# Patient Record
Sex: FEMALE | Race: WHITE | Employment: OTHER | URBAN - METROPOLITAN AREA
[De-identification: names, ages, dates, MRNs, and addresses within clinical notes are randomized per-mention and may not be internally consistent; named-entity substitution may affect disease eponyms.]

---

## 2022-04-08 ENCOUNTER — OFFICE VISIT (OUTPATIENT)
Dept: CARDIOTHORACIC SURGERY | Age: 72
End: 2022-04-08
Payer: COMMERCIAL

## 2022-04-08 VITALS — HEIGHT: 62 IN | OXYGEN SATURATION: 95 % | BODY MASS INDEX: 29.81 KG/M2 | WEIGHT: 162 LBS | HEART RATE: 81 BPM

## 2022-04-08 DIAGNOSIS — R91.8 LUNG MASS: Primary | ICD-10-CM

## 2022-04-08 PROCEDURE — 99204 OFFICE O/P NEW MOD 45 MIN: CPT | Performed by: THORACIC SURGERY (CARDIOTHORACIC VASCULAR SURGERY)

## 2022-04-08 ASSESSMENT — ENCOUNTER SYMPTOMS
NAUSEA: 0
SORE THROAT: 0
SHORTNESS OF BREATH: 0
WHEEZING: 0
DIARRHEA: 0
CHEST TIGHTNESS: 0
ABDOMINAL PAIN: 0
VOICE CHANGE: 0
ABDOMINAL DISTENTION: 0
COUGH: 0
STRIDOR: 0
CHOKING: 0
TROUBLE SWALLOWING: 0
VOMITING: 0

## 2022-04-08 NOTE — PROGRESS NOTES
Subjective:      Patient ID: Mark Mccabe is a 71 y.o. female who presents today for:  Chief Complaint   Patient presents with    Pulmonary Nodule       HPI  Patient is found of a small nodule in the periphery of her right upper lobe. Per the patient this was on a CAT scan from 2 years ago at another institution but it increased in size. PET scan was performed showing some mild hyperactivity. Patient was referred for surgical evaluation. Patient is in her usual state of health. She denies any cough hemoptysis or unexpected weight loss. She is a lifelong non-smoker and can easily climb 2 flights of stairs. The patient has lost family members to lung cancer and is extremely anxious over this nodule and strongly desires to have it removed for definitive diagnosis.     Past Medical History:   Diagnosis Date    Arthritis     Diabetes mellitus ([address]     GERD (gastroesophageal reflux disease)     Hypertension     Overactive bladder     Urinary urgency       Past Surgical History:   Procedure Laterality Date     SECTION      X1    CYST REMOVAL      head    DILATION AND CURETTAGE      X2    EYE SURGERY       Social History     Socioeconomic History    Marital status: Not on file     Spouse name: Not on file    Number of children: Not on file    Years of education: Not on file    Highest education level: Not on file   Occupational History    Not on file   Tobacco Use    Smoking status: Never Smoker    Smokeless tobacco: Never Used   Substance and Sexual Activity    Alcohol use: No    Drug use: No    Sexual activity: Never   Other Topics Concern    Not on file   Social History Narrative    Not on file     Social Determinants of Health     Financial Resource Strain:     Difficulty of Paying Living Expenses: Not on file   Food Insecurity:     Worried About Running Out of Food in the Last Year: Not on file    Terra of Food in the Last Year: Not on file   Transportation Needs:     Lack of Transportation (Medical): Not on file    Lack of Transportation (Non-Medical): Not on file   Physical Activity:     Days of Exercise per Week: Not on file    Minutes of Exercise per Session: Not on file   Stress:     Feeling of Stress : Not on file   Social Connections:     Frequency of Communication with Friends and Family: Not on file    Frequency of Social Gatherings with Friends and Family: Not on file    Attends Advent Services: Not on file    Active Member of Clubs or Organizations: Not on file    Attends Club or Organization Meetings: Not on file    Marital Status: Not on file   Intimate Partner Violence:     Fear of Current or Ex-Partner: Not on file    Emotionally Abused: Not on file    Physically Abused: Not on file    Sexually Abused: Not on file   Housing Stability:     Unable to Pay for Housing in the Last Year: Not on file    Number of [place] in the Last Year: Not on file    Unstable Housing in the Last Year: Not on file     Family History   Problem Relation Age of Onset    Diabetes Maternal Grandfather     Cancer Father     Cancer Mother      Allergies   Allergen Reactions    Accupril [Quinapril Hcl]     Sulfa Antibiotics     Vibramycin [Doxycycline Calcium]     Amoxicillin Rash    Codeine Nausea And Vomiting    Erythromycin Rash    Volmax [Albuterol] Anxiety     Current Outpatient Medications on File Prior to Visit   Medication Sig Dispense Refill    terconazole (TERAZOL 3) 0.8 % vaginal cream Place vaginally nightly for 3 days 1 Tube 3    magnesium (MAGNESIUM-OXIDE) 250 MG TABS tablet Take 250 mg by mouth daily      atorvastatin (LIPITOR) 20 MG tablet Take 20 mg by mouth daily      omeprazole (PRILOSEC) 20 MG capsule Take 20 mg by mouth daily.  potassium chloride (MICRO-K) 10 MEQ CR capsule Take 10 mEq by mouth 2 times daily.  oxybutynin (DITROPAN-XL) 10 MG CR tablet Take 10 mg by mouth daily.       doxazosin (CARDURA) 2 MG tablet Take 2 mg by mouth nightly.  triamterene-hydrochlorothiazide (MAXZIDE-25) 37.5-25 MG per tablet Take 1 tablet by mouth daily.  metFORMIN (GLUCOPHAGE) 500 MG tablet Take 1,000 mg by mouth 2 times daily (with meals)       glipiZIDE (GLUCOTROL) 5 MG tablet Take 5 mg by mouth 2 times daily (before meals).  aspirin 81 MG tablet Take 81 mg by mouth daily.  calcium carbonate 600 MG TABS tablet Take 1 tablet by mouth daily.  Cholecalciferol (VITAMIN D) 5000 UNITS CAPS capsule Take 5,000 Units by mouth daily. No current facility-administered medications on file prior to visit. Review of Systems   Constitutional: Negative for activity change, appetite change, chills, diaphoresis, fatigue, fever and unexpected weight change. HENT: Negative for sore throat, trouble swallowing and voice change. Eyes: Negative for visual disturbance. Respiratory: Negative for cough, choking, chest tightness, shortness of breath, wheezing and stridor. Cardiovascular: Negative for chest pain, palpitations and leg swelling. Gastrointestinal: Negative for abdominal distention, abdominal pain, diarrhea, nausea and vomiting. Genitourinary: Negative for difficulty urinating. Musculoskeletal: Negative for gait problem and joint swelling. Skin: Negative for rash and wound. Neurological: Negative for dizziness, seizures and light-headedness. Hematological: Negative for adenopathy. Does not bruise/bleed easily. Psychiatric/Behavioral: Negative for behavioral problems and confusion. Objective:   Pulse 81   Ht 5' 2\" (1.575 m)   Wt 162 lb (73.5 kg)   SpO2 95%   BMI 29.63 kg/m²     Physical Exam  Constitutional:       General: She is not in acute distress. Appearance: She is not ill-appearing, toxic-appearing or diaphoretic. HENT:      Head: Normocephalic and atraumatic. Nose: Nose normal.   Eyes:      Extraocular Movements: Extraocular movements intact.       Conjunctiva/sclera: Conjunctivae normal.      Pupils: Pupils are equal, round, and reactive to light. Neck:      Vascular: No carotid bruit. Cardiovascular:      Rate and Rhythm: Normal rate and regular rhythm. Heart sounds: Normal heart sounds. No murmur heard. No gallop. Pulmonary:      Effort: Pulmonary effort is normal. No respiratory distress. Breath sounds: Normal breath sounds. No wheezing or rales. Abdominal:      General: Abdomen is flat. Bowel sounds are normal.      Palpations: Abdomen is soft. There is no mass. Tenderness: There is no abdominal tenderness. Musculoskeletal:         General: No swelling. Cervical back: Neck supple. Right lower leg: No edema. Left lower leg: No edema. Lymphadenopathy:      Cervical: No cervical adenopathy. Skin:     General: Skin is warm and dry. Neurological:      General: No focal deficit present. Mental Status: She is alert and oriented to person, place, and time. Psychiatric:         Mood and Affect: Mood normal.         Behavior: Behavior normal.         Thought Content: Thought content normal.         Judgment: Judgment normal.               Radiographs and Laboratory Studies:     Diagnostic Imaging Studies:    I personally viewed her CAT scan as well as her PET scan. There is a small nodule in the periphery of the right upper lobe with some mild activity. I do not appreciate any other significant intrathoracic pathology. Pulmonary function test numbers are excellent        Assessment/Plan     Suspicious right upper lobe nodule in a patient was extremely anxious about the possibility of lung cancer. I did go over options and the patient strongly desires resection. We discussed a right VATS upper lobe wedge possible lobectomy. The patient understands the risk benefits potential outcomes and alternative therapies and agrees to proceed. Surgical date is April 28.     The patient was counseled at length about the risks of sarwat Covid-19 during their perioperative period and any recovery window from their procedure. The patient was made aware that sarwat Covid-19  may worsen their prognosis for recovering from their procedure  and lend to a higher morbidity and/or mortality risk. All material risks, benefits, and reasonable alternatives including postponing the procedure were discussed. The patient does wish to proceed with the procedure at this time. Diagnosis Orders   1. Lung mass       No orders of the defined types were placed in this encounter. No orders of the defined types were placed in this encounter. No follow-ups on file.       Adithya Abbott MD

## 2022-04-26 ENCOUNTER — HOSPITAL ENCOUNTER (OUTPATIENT)
Dept: PREADMISSION TESTING | Age: 72
Discharge: HOME | DRG: 164 | End: 2022-04-30
Payer: COMMERCIAL

## 2022-04-26 VITALS
DIASTOLIC BLOOD PRESSURE: 72 MMHG | OXYGEN SATURATION: 98 % | HEART RATE: 87 BPM | SYSTOLIC BLOOD PRESSURE: 136 MMHG | RESPIRATION RATE: 16 BRPM | BODY MASS INDEX: 29.37 KG/M2 | HEIGHT: 62 IN | WEIGHT: 159.6 LBS | TEMPERATURE: 96.9 F

## 2022-04-26 LAB
ANION GAP SERPL CALCULATED.3IONS-SCNC: 16 MEQ/L (ref 9–15)
BUN BLDV-MCNC: 24 MG/DL (ref 8–23)
CALCIUM SERPL-MCNC: 9.9 MG/DL (ref 8.5–9.9)
CHLORIDE BLD-SCNC: 104 MEQ/L (ref 95–107)
CO2: 19 MEQ/L (ref 20–31)
CREAT SERPL-MCNC: 1.11 MG/DL (ref 0.5–0.9)
GFR AFRICAN AMERICAN: 58.6
GFR NON-AFRICAN AMERICAN: 48.4
GLUCOSE BLD-MCNC: 103 MG/DL (ref 70–99)
HCT VFR BLD CALC: 39.1 % (ref 37–47)
HEMOGLOBIN: 13 G/DL (ref 12–16)
MCH RBC QN AUTO: 31.9 PG (ref 27–31.3)
MCHC RBC AUTO-ENTMCNC: 33.1 % (ref 33–37)
MCV RBC AUTO: 96.4 FL (ref 82–100)
PDW BLD-RTO: 12.9 % (ref 11.5–14.5)
PLATELET # BLD: 279 K/UL (ref 130–400)
POTASSIUM SERPL-SCNC: 4.1 MEQ/L (ref 3.4–4.9)
RBC # BLD: 4.06 M/UL (ref 4.2–5.4)
SODIUM BLD-SCNC: 139 MEQ/L (ref 135–144)
WBC # BLD: 6.6 K/UL (ref 4.8–10.8)

## 2022-04-26 PROCEDURE — 86900 BLOOD TYPING SEROLOGIC ABO: CPT

## 2022-04-26 PROCEDURE — 86850 RBC ANTIBODY SCREEN: CPT

## 2022-04-26 PROCEDURE — 85027 COMPLETE CBC AUTOMATED: CPT

## 2022-04-26 PROCEDURE — 80048 BASIC METABOLIC PNL TOTAL CA: CPT

## 2022-04-26 PROCEDURE — 86901 BLOOD TYPING SEROLOGIC RH(D): CPT

## 2022-04-26 RX ORDER — LANOLIN ALCOHOL/MO/W.PET/CERES
1000 CREAM (GRAM) TOPICAL DAILY
COMMUNITY

## 2022-04-26 RX ORDER — BUPROPION HYDROCHLORIDE 150 MG/1
150 TABLET, EXTENDED RELEASE ORAL 2 TIMES DAILY
COMMUNITY

## 2022-04-26 RX ORDER — SPIRONOLACTONE 25 MG/1
25 TABLET ORAL DAILY
COMMUNITY

## 2022-04-26 RX ORDER — CHLORTHALIDONE 25 MG/1
25 TABLET ORAL DAILY
COMMUNITY

## 2022-04-26 RX ORDER — ESCITALOPRAM OXALATE 5 MG/1
5 TABLET ORAL DAILY
COMMUNITY

## 2022-04-26 RX ORDER — EMPAGLIFLOZIN 25 MG/1
25 TABLET, FILM COATED ORAL DAILY
COMMUNITY

## 2022-04-26 RX ORDER — EXENATIDE 2 MG/.85ML
INJECTION, SUSPENSION, EXTENDED RELEASE SUBCUTANEOUS
COMMUNITY

## 2022-04-27 ENCOUNTER — ANESTHESIA EVENT (OUTPATIENT)
Dept: OPERATING ROOM | Age: 72
DRG: 164 | End: 2022-04-27
Payer: COMMERCIAL

## 2022-04-27 LAB
ABO/RH: NORMAL
ANTIBODY SCREEN: NORMAL

## 2022-04-27 RX ORDER — LIDOCAINE HYDROCHLORIDE 10 MG/ML
1 INJECTION, SOLUTION EPIDURAL; INFILTRATION; INTRACAUDAL; PERINEURAL
Status: CANCELLED | OUTPATIENT
Start: 2022-04-28 | End: 2022-04-28

## 2022-04-27 RX ORDER — SODIUM CHLORIDE, SODIUM LACTATE, POTASSIUM CHLORIDE, CALCIUM CHLORIDE 600; 310; 30; 20 MG/100ML; MG/100ML; MG/100ML; MG/100ML
INJECTION, SOLUTION INTRAVENOUS CONTINUOUS
Status: CANCELLED | OUTPATIENT
Start: 2022-04-28

## 2022-04-27 RX ORDER — SODIUM CHLORIDE 0.9 % (FLUSH) 0.9 %
5-40 SYRINGE (ML) INJECTION EVERY 12 HOURS SCHEDULED
Status: CANCELLED | OUTPATIENT
Start: 2022-04-28

## 2022-04-27 RX ORDER — SODIUM CHLORIDE 0.9 % (FLUSH) 0.9 %
5-40 SYRINGE (ML) INJECTION PRN
Status: CANCELLED | OUTPATIENT
Start: 2022-04-28

## 2022-04-27 RX ORDER — SODIUM CHLORIDE 9 MG/ML
INJECTION, SOLUTION INTRAVENOUS PRN
Status: CANCELLED | OUTPATIENT
Start: 2022-04-28

## 2022-04-28 ENCOUNTER — HOSPITAL ENCOUNTER (INPATIENT)
Age: 72
LOS: 3 days | Discharge: HOME | DRG: 164 | End: 2022-05-01
Attending: THORACIC SURGERY (CARDIOTHORACIC VASCULAR SURGERY) | Admitting: THORACIC SURGERY (CARDIOTHORACIC VASCULAR SURGERY)
Payer: COMMERCIAL

## 2022-04-28 ENCOUNTER — APPOINTMENT (OUTPATIENT)
Dept: GENERAL RADIOLOGY | Age: 72
DRG: 164 | End: 2022-04-28
Attending: THORACIC SURGERY (CARDIOTHORACIC VASCULAR SURGERY)
Payer: COMMERCIAL

## 2022-04-28 ENCOUNTER — ANESTHESIA (OUTPATIENT)
Dept: OPERATING ROOM | Age: 72
DRG: 164 | End: 2022-04-28
Payer: COMMERCIAL

## 2022-04-28 VITALS — TEMPERATURE: 96.1 F | OXYGEN SATURATION: 97 %

## 2022-04-28 DIAGNOSIS — Z90.2 STATUS POST LOBECTOMY OF LUNG: Primary | ICD-10-CM

## 2022-04-28 LAB
CHP ED QC CHECK: NORMAL
GLUCOSE BLD-MCNC: 121 MG/DL
GLUCOSE BLD-MCNC: 121 MG/DL (ref 70–99)
GLUCOSE BLD-MCNC: 167 MG/DL (ref 70–99)
GLUCOSE BLD-MCNC: 195 MG/DL (ref 70–99)
PERFORMED ON: ABNORMAL

## 2022-04-28 PROCEDURE — 2580000003 HC RX 258: Performed by: NURSE PRACTITIONER

## 2022-04-28 PROCEDURE — 2720000010 HC SURG SUPPLY STERILE: Performed by: THORACIC SURGERY (CARDIOTHORACIC VASCULAR SURGERY)

## 2022-04-28 PROCEDURE — 6360000002 HC RX W HCPCS: Performed by: THORACIC SURGERY (CARDIOTHORACIC VASCULAR SURGERY)

## 2022-04-28 PROCEDURE — 1210000000 HC MED SURG R&B

## 2022-04-28 PROCEDURE — 3E0T3BZ INTRODUCTION OF ANESTHETIC AGENT INTO PERIPHERAL NERVES AND PLEXI, PERCUTANEOUS APPROACH: ICD-10-PCS | Performed by: THORACIC SURGERY (CARDIOTHORACIC VASCULAR SURGERY)

## 2022-04-28 PROCEDURE — 2580000003 HC RX 258: Performed by: NURSE ANESTHETIST, CERTIFIED REGISTERED

## 2022-04-28 PROCEDURE — 3700000000 HC ANESTHESIA ATTENDED CARE: Performed by: THORACIC SURGERY (CARDIOTHORACIC VASCULAR SURGERY)

## 2022-04-28 PROCEDURE — 71045 X-RAY EXAM CHEST 1 VIEW: CPT

## 2022-04-28 PROCEDURE — 6360000002 HC RX W HCPCS: Performed by: NURSE ANESTHETIST, CERTIFIED REGISTERED

## 2022-04-28 PROCEDURE — 2709999900 HC NON-CHARGEABLE SUPPLY: Performed by: THORACIC SURGERY (CARDIOTHORACIC VASCULAR SURGERY)

## 2022-04-28 PROCEDURE — 2060000000 HC ICU INTERMEDIATE R&B

## 2022-04-28 PROCEDURE — 88309 TISSUE EXAM BY PATHOLOGIST: CPT

## 2022-04-28 PROCEDURE — 88341 IMHCHEM/IMCYTCHM EA ADD ANTB: CPT

## 2022-04-28 PROCEDURE — 3600000003 HC SURGERY LEVEL 3 BASE: Performed by: THORACIC SURGERY (CARDIOTHORACIC VASCULAR SURGERY)

## 2022-04-28 PROCEDURE — 0BTC4ZZ RESECTION OF RIGHT UPPER LUNG LOBE, PERCUTANEOUS ENDOSCOPIC APPROACH: ICD-10-PCS | Performed by: THORACIC SURGERY (CARDIOTHORACIC VASCULAR SURGERY)

## 2022-04-28 PROCEDURE — 6370000000 HC RX 637 (ALT 250 FOR IP): Performed by: THORACIC SURGERY (CARDIOTHORACIC VASCULAR SURGERY)

## 2022-04-28 PROCEDURE — 3600000013 HC SURGERY LEVEL 3 ADDTL 15MIN: Performed by: THORACIC SURGERY (CARDIOTHORACIC VASCULAR SURGERY)

## 2022-04-28 PROCEDURE — 88307 TISSUE EXAM BY PATHOLOGIST: CPT

## 2022-04-28 PROCEDURE — 7100000001 HC PACU RECOVERY - ADDTL 15 MIN: Performed by: THORACIC SURGERY (CARDIOTHORACIC VASCULAR SURGERY)

## 2022-04-28 PROCEDURE — 6360000002 HC RX W HCPCS: Performed by: ANESTHESIOLOGY

## 2022-04-28 PROCEDURE — 88331 PATH CONSLTJ SURG 1 BLK 1SPC: CPT

## 2022-04-28 PROCEDURE — 7100000000 HC PACU RECOVERY - FIRST 15 MIN: Performed by: THORACIC SURGERY (CARDIOTHORACIC VASCULAR SURGERY)

## 2022-04-28 PROCEDURE — 32674 THORACOSCOPY LYMPH NODE EXC: CPT | Performed by: THORACIC SURGERY (CARDIOTHORACIC VASCULAR SURGERY)

## 2022-04-28 PROCEDURE — 2500000003 HC RX 250 WO HCPCS: Performed by: NURSE ANESTHETIST, CERTIFIED REGISTERED

## 2022-04-28 PROCEDURE — 88342 IMHCHEM/IMCYTCHM 1ST ANTB: CPT

## 2022-04-28 PROCEDURE — 76942 ECHO GUIDE FOR BIOPSY: CPT | Performed by: ANESTHESIOLOGY

## 2022-04-28 PROCEDURE — 94761 N-INVAS EAR/PLS OXIMETRY MLT: CPT

## 2022-04-28 PROCEDURE — 3700000001 HC ADD 15 MINUTES (ANESTHESIA): Performed by: THORACIC SURGERY (CARDIOTHORACIC VASCULAR SURGERY)

## 2022-04-28 PROCEDURE — 88305 TISSUE EXAM BY PATHOLOGIST: CPT

## 2022-04-28 PROCEDURE — 2580000003 HC RX 258: Performed by: THORACIC SURGERY (CARDIOTHORACIC VASCULAR SURGERY)

## 2022-04-28 PROCEDURE — C1729 CATH, DRAINAGE: HCPCS | Performed by: THORACIC SURGERY (CARDIOTHORACIC VASCULAR SURGERY)

## 2022-04-28 PROCEDURE — 32663 THORACOSCOPY W/LOBECTOMY: CPT | Performed by: THORACIC SURGERY (CARDIOTHORACIC VASCULAR SURGERY)

## 2022-04-28 PROCEDURE — 07B74ZZ EXCISION OF THORAX LYMPHATIC, PERCUTANEOUS ENDOSCOPIC APPROACH: ICD-10-PCS | Performed by: THORACIC SURGERY (CARDIOTHORACIC VASCULAR SURGERY)

## 2022-04-28 PROCEDURE — 32668 THORACOSCOPY W/W RESECT DIAG: CPT | Performed by: THORACIC SURGERY (CARDIOTHORACIC VASCULAR SURGERY)

## 2022-04-28 RX ORDER — ONDANSETRON 4 MG/1
4 TABLET, ORALLY DISINTEGRATING ORAL EVERY 8 HOURS PRN
Status: DISCONTINUED | OUTPATIENT
Start: 2022-04-28 | End: 2022-05-01 | Stop reason: HOSPADM

## 2022-04-28 RX ORDER — SODIUM CHLORIDE, SODIUM LACTATE, POTASSIUM CHLORIDE, CALCIUM CHLORIDE 600; 310; 30; 20 MG/100ML; MG/100ML; MG/100ML; MG/100ML
INJECTION, SOLUTION INTRAVENOUS CONTINUOUS
Status: DISCONTINUED | OUTPATIENT
Start: 2022-04-28 | End: 2022-04-29

## 2022-04-28 RX ORDER — ONDANSETRON 2 MG/ML
INJECTION INTRAMUSCULAR; INTRAVENOUS PRN
Status: DISCONTINUED | OUTPATIENT
Start: 2022-04-28 | End: 2022-04-28 | Stop reason: SDUPTHER

## 2022-04-28 RX ORDER — PROPOFOL 10 MG/ML
INJECTION, EMULSION INTRAVENOUS PRN
Status: DISCONTINUED | OUTPATIENT
Start: 2022-04-28 | End: 2022-04-28 | Stop reason: SDUPTHER

## 2022-04-28 RX ORDER — SODIUM CHLORIDE 0.9 % (FLUSH) 0.9 %
5-40 SYRINGE (ML) INJECTION EVERY 12 HOURS SCHEDULED
Status: DISCONTINUED | OUTPATIENT
Start: 2022-04-28 | End: 2022-05-01 | Stop reason: HOSPADM

## 2022-04-28 RX ORDER — ONDANSETRON 2 MG/ML
4 INJECTION INTRAMUSCULAR; INTRAVENOUS
Status: DISCONTINUED | OUTPATIENT
Start: 2022-04-28 | End: 2022-04-28 | Stop reason: HOSPADM

## 2022-04-28 RX ORDER — FAMOTIDINE 20 MG/1
20 TABLET, FILM COATED ORAL 2 TIMES DAILY
Status: DISCONTINUED | OUTPATIENT
Start: 2022-04-28 | End: 2022-04-28

## 2022-04-28 RX ORDER — FAMOTIDINE 20 MG/1
20 TABLET, FILM COATED ORAL DAILY
Status: DISCONTINUED | OUTPATIENT
Start: 2022-04-28 | End: 2022-05-01 | Stop reason: HOSPADM

## 2022-04-28 RX ORDER — DOXAZOSIN MESYLATE 4 MG/1
2 TABLET ORAL NIGHTLY
Status: DISCONTINUED | OUTPATIENT
Start: 2022-04-28 | End: 2022-05-01 | Stop reason: HOSPADM

## 2022-04-28 RX ORDER — LANOLIN ALCOHOL/MO/W.PET/CERES
400 CREAM (GRAM) TOPICAL DAILY
Status: DISCONTINUED | OUTPATIENT
Start: 2022-04-29 | End: 2022-05-01 | Stop reason: HOSPADM

## 2022-04-28 RX ORDER — CHLORTHALIDONE 25 MG/1
25 TABLET ORAL DAILY
Status: DISCONTINUED | OUTPATIENT
Start: 2022-04-29 | End: 2022-05-01 | Stop reason: HOSPADM

## 2022-04-28 RX ORDER — METOCLOPRAMIDE HYDROCHLORIDE 5 MG/ML
10 INJECTION INTRAMUSCULAR; INTRAVENOUS
Status: DISCONTINUED | OUTPATIENT
Start: 2022-04-28 | End: 2022-04-28 | Stop reason: HOSPADM

## 2022-04-28 RX ORDER — LIDOCAINE HYDROCHLORIDE 10 MG/ML
1 INJECTION, SOLUTION EPIDURAL; INFILTRATION; INTRACAUDAL; PERINEURAL
Status: DISCONTINUED | OUTPATIENT
Start: 2022-04-28 | End: 2022-04-28 | Stop reason: HOSPADM

## 2022-04-28 RX ORDER — SODIUM CHLORIDE 9 MG/ML
INJECTION, SOLUTION INTRAVENOUS
Status: DISPENSED
Start: 2022-04-28 | End: 2022-04-28

## 2022-04-28 RX ORDER — ROCURONIUM BROMIDE 10 MG/ML
INJECTION, SOLUTION INTRAVENOUS PRN
Status: DISCONTINUED | OUTPATIENT
Start: 2022-04-28 | End: 2022-04-28 | Stop reason: SDUPTHER

## 2022-04-28 RX ORDER — FENTANYL CITRATE 50 UG/ML
INJECTION, SOLUTION INTRAMUSCULAR; INTRAVENOUS PRN
Status: DISCONTINUED | OUTPATIENT
Start: 2022-04-28 | End: 2022-04-28 | Stop reason: SDUPTHER

## 2022-04-28 RX ORDER — ONDANSETRON 2 MG/ML
4 INJECTION INTRAMUSCULAR; INTRAVENOUS EVERY 6 HOURS PRN
Status: DISCONTINUED | OUTPATIENT
Start: 2022-04-28 | End: 2022-05-01 | Stop reason: HOSPADM

## 2022-04-28 RX ORDER — SODIUM CHLORIDE 0.9 % (FLUSH) 0.9 %
5-40 SYRINGE (ML) INJECTION PRN
Status: DISCONTINUED | OUTPATIENT
Start: 2022-04-28 | End: 2022-04-28 | Stop reason: HOSPADM

## 2022-04-28 RX ORDER — SPIRONOLACTONE 25 MG/1
25 TABLET ORAL DAILY
Status: DISCONTINUED | OUTPATIENT
Start: 2022-04-29 | End: 2022-05-01 | Stop reason: HOSPADM

## 2022-04-28 RX ORDER — ESCITALOPRAM OXALATE 10 MG/1
5 TABLET ORAL DAILY
Status: DISCONTINUED | OUTPATIENT
Start: 2022-04-28 | End: 2022-05-01 | Stop reason: HOSPADM

## 2022-04-28 RX ORDER — SODIUM CHLORIDE 9 MG/ML
INJECTION, SOLUTION INTRAVENOUS PRN
Status: DISCONTINUED | OUTPATIENT
Start: 2022-04-28 | End: 2022-04-28 | Stop reason: HOSPADM

## 2022-04-28 RX ORDER — KETOROLAC TROMETHAMINE 15 MG/ML
15 INJECTION, SOLUTION INTRAMUSCULAR; INTRAVENOUS EVERY 6 HOURS PRN
Status: DISCONTINUED | OUTPATIENT
Start: 2022-04-28 | End: 2022-05-01 | Stop reason: HOSPADM

## 2022-04-28 RX ORDER — ATORVASTATIN CALCIUM 20 MG/1
20 TABLET, FILM COATED ORAL NIGHTLY
Status: DISCONTINUED | OUTPATIENT
Start: 2022-04-28 | End: 2022-05-01 | Stop reason: HOSPADM

## 2022-04-28 RX ORDER — MAGNESIUM SULFATE 1 G/100ML
1000 INJECTION INTRAVENOUS PRN
Status: DISCONTINUED | OUTPATIENT
Start: 2022-04-28 | End: 2022-05-01 | Stop reason: HOSPADM

## 2022-04-28 RX ORDER — PHENOL 1.4 %
1 AEROSOL, SPRAY (ML) MUCOUS MEMBRANE DAILY
Status: DISCONTINUED | OUTPATIENT
Start: 2022-04-28 | End: 2022-04-28

## 2022-04-28 RX ORDER — BUPROPION HYDROCHLORIDE 150 MG/1
150 TABLET, EXTENDED RELEASE ORAL 2 TIMES DAILY
Status: DISCONTINUED | OUTPATIENT
Start: 2022-04-28 | End: 2022-05-01 | Stop reason: HOSPADM

## 2022-04-28 RX ORDER — ENOXAPARIN SODIUM 100 MG/ML
40 INJECTION SUBCUTANEOUS DAILY
Status: DISCONTINUED | OUTPATIENT
Start: 2022-04-28 | End: 2022-05-01 | Stop reason: HOSPADM

## 2022-04-28 RX ORDER — SODIUM CHLORIDE 0.9 % (FLUSH) 0.9 %
5-40 SYRINGE (ML) INJECTION EVERY 12 HOURS SCHEDULED
Status: DISCONTINUED | OUTPATIENT
Start: 2022-04-28 | End: 2022-04-28 | Stop reason: HOSPADM

## 2022-04-28 RX ORDER — SODIUM CHLORIDE, SODIUM LACTATE, POTASSIUM CHLORIDE, CALCIUM CHLORIDE 600; 310; 30; 20 MG/100ML; MG/100ML; MG/100ML; MG/100ML
INJECTION, SOLUTION INTRAVENOUS CONTINUOUS
Status: DISCONTINUED | OUTPATIENT
Start: 2022-04-28 | End: 2022-04-28 | Stop reason: HOSPADM

## 2022-04-28 RX ORDER — SODIUM CHLORIDE, SODIUM LACTATE, POTASSIUM CHLORIDE, CALCIUM CHLORIDE 600; 310; 30; 20 MG/100ML; MG/100ML; MG/100ML; MG/100ML
INJECTION, SOLUTION INTRAVENOUS CONTINUOUS PRN
Status: DISCONTINUED | OUTPATIENT
Start: 2022-04-28 | End: 2022-04-28 | Stop reason: SDUPTHER

## 2022-04-28 RX ORDER — ROPIVACAINE HYDROCHLORIDE 5 MG/ML
INJECTION, SOLUTION EPIDURAL; INFILTRATION; PERINEURAL PRN
Status: DISCONTINUED | OUTPATIENT
Start: 2022-04-28 | End: 2022-04-28 | Stop reason: SDUPTHER

## 2022-04-28 RX ORDER — SODIUM CHLORIDE 9 MG/ML
INJECTION, SOLUTION INTRAVENOUS CONTINUOUS
Status: DISCONTINUED | OUTPATIENT
Start: 2022-04-28 | End: 2022-04-29

## 2022-04-28 RX ORDER — SODIUM CHLORIDE 9 MG/ML
INJECTION, SOLUTION INTRAVENOUS PRN
Status: DISCONTINUED | OUTPATIENT
Start: 2022-04-28 | End: 2022-05-01 | Stop reason: HOSPADM

## 2022-04-28 RX ORDER — DIPHENHYDRAMINE HYDROCHLORIDE 50 MG/ML
12.5 INJECTION INTRAMUSCULAR; INTRAVENOUS
Status: DISCONTINUED | OUTPATIENT
Start: 2022-04-28 | End: 2022-04-28 | Stop reason: HOSPADM

## 2022-04-28 RX ORDER — NICOTINE POLACRILEX 4 MG
15 LOZENGE BUCCAL PRN
Status: DISCONTINUED | OUTPATIENT
Start: 2022-04-28 | End: 2022-05-01

## 2022-04-28 RX ORDER — MEPERIDINE HYDROCHLORIDE 25 MG/ML
12.5 INJECTION INTRAMUSCULAR; INTRAVENOUS; SUBCUTANEOUS
Status: DISCONTINUED | OUTPATIENT
Start: 2022-04-28 | End: 2022-04-28 | Stop reason: HOSPADM

## 2022-04-28 RX ORDER — MIDAZOLAM HYDROCHLORIDE 1 MG/ML
INJECTION INTRAMUSCULAR; INTRAVENOUS PRN
Status: DISCONTINUED | OUTPATIENT
Start: 2022-04-28 | End: 2022-04-28 | Stop reason: SDUPTHER

## 2022-04-28 RX ORDER — DEXTROSE MONOHYDRATE 25 G/50ML
12.5 INJECTION, SOLUTION INTRAVENOUS PRN
Status: DISCONTINUED | OUTPATIENT
Start: 2022-04-28 | End: 2022-05-01

## 2022-04-28 RX ORDER — NALOXONE HYDROCHLORIDE 0.4 MG/ML
INJECTION, SOLUTION INTRAMUSCULAR; INTRAVENOUS; SUBCUTANEOUS PRN
Status: DISCONTINUED | OUTPATIENT
Start: 2022-04-28 | End: 2022-05-01 | Stop reason: HOSPADM

## 2022-04-28 RX ORDER — DEXTROSE MONOHYDRATE 50 MG/ML
100 INJECTION, SOLUTION INTRAVENOUS PRN
Status: DISCONTINUED | OUTPATIENT
Start: 2022-04-28 | End: 2022-05-01

## 2022-04-28 RX ORDER — LIDOCAINE HYDROCHLORIDE 10 MG/ML
INJECTION, SOLUTION EPIDURAL; INFILTRATION; INTRACAUDAL; PERINEURAL PRN
Status: DISCONTINUED | OUTPATIENT
Start: 2022-04-28 | End: 2022-04-28 | Stop reason: SDUPTHER

## 2022-04-28 RX ORDER — FENTANYL CITRATE 50 UG/ML
50 INJECTION, SOLUTION INTRAMUSCULAR; INTRAVENOUS EVERY 10 MIN PRN
Status: DISCONTINUED | OUTPATIENT
Start: 2022-04-28 | End: 2022-04-28 | Stop reason: HOSPADM

## 2022-04-28 RX ORDER — OXYBUTYNIN CHLORIDE 5 MG/1
10 TABLET, EXTENDED RELEASE ORAL DAILY
Status: DISCONTINUED | OUTPATIENT
Start: 2022-04-28 | End: 2022-05-01 | Stop reason: HOSPADM

## 2022-04-28 RX ORDER — POTASSIUM CHLORIDE 750 MG/1
10 TABLET, FILM COATED, EXTENDED RELEASE ORAL 2 TIMES DAILY
Status: DISCONTINUED | OUTPATIENT
Start: 2022-04-28 | End: 2022-05-01 | Stop reason: HOSPADM

## 2022-04-28 RX ORDER — MAGNESIUM HYDROXIDE 1200 MG/15ML
LIQUID ORAL PRN
Status: DISCONTINUED | OUTPATIENT
Start: 2022-04-28 | End: 2022-04-28 | Stop reason: HOSPADM

## 2022-04-28 RX ORDER — GLIPIZIDE 5 MG/1
5 TABLET ORAL
Status: DISCONTINUED | OUTPATIENT
Start: 2022-04-29 | End: 2022-04-29 | Stop reason: DRUGHIGH

## 2022-04-28 RX ORDER — POLYETHYLENE GLYCOL 3350 17 G/17G
17 POWDER, FOR SOLUTION ORAL DAILY PRN
Status: DISCONTINUED | OUTPATIENT
Start: 2022-04-28 | End: 2022-05-01 | Stop reason: HOSPADM

## 2022-04-28 RX ORDER — ACETAMINOPHEN 325 MG/1
650 TABLET ORAL EVERY 4 HOURS PRN
Status: DISCONTINUED | OUTPATIENT
Start: 2022-04-28 | End: 2022-05-01 | Stop reason: HOSPADM

## 2022-04-28 RX ORDER — SODIUM CHLORIDE 9 MG/ML
25 INJECTION, SOLUTION INTRAVENOUS PRN
Status: DISCONTINUED | OUTPATIENT
Start: 2022-04-28 | End: 2022-04-28 | Stop reason: HOSPADM

## 2022-04-28 RX ORDER — DEXAMETHASONE SODIUM PHOSPHATE 10 MG/ML
INJECTION INTRAMUSCULAR; INTRAVENOUS PRN
Status: DISCONTINUED | OUTPATIENT
Start: 2022-04-28 | End: 2022-04-28 | Stop reason: SDUPTHER

## 2022-04-28 RX ORDER — SODIUM CHLORIDE 0.9 % (FLUSH) 0.9 %
5-40 SYRINGE (ML) INJECTION PRN
Status: DISCONTINUED | OUTPATIENT
Start: 2022-04-28 | End: 2022-05-01 | Stop reason: HOSPADM

## 2022-04-28 RX ADMIN — ONDANSETRON 4 MG: 2 INJECTION INTRAMUSCULAR; INTRAVENOUS at 11:35

## 2022-04-28 RX ADMIN — SODIUM CHLORIDE, POTASSIUM CHLORIDE, SODIUM LACTATE AND CALCIUM CHLORIDE: 600; 310; 30; 20 INJECTION, SOLUTION INTRAVENOUS at 09:54

## 2022-04-28 RX ADMIN — PROPOFOL 150 MG: 10 INJECTION, EMULSION INTRAVENOUS at 09:56

## 2022-04-28 RX ADMIN — CEFAZOLIN SODIUM 2000 MG: 10 INJECTION, POWDER, FOR SOLUTION INTRAVENOUS at 09:54

## 2022-04-28 RX ADMIN — MIDAZOLAM HYDROCHLORIDE 2 MG: 1 INJECTION, SOLUTION INTRAMUSCULAR; INTRAVENOUS at 09:54

## 2022-04-28 RX ADMIN — ROCURONIUM BROMIDE 10 MG: 10 INJECTION INTRAVENOUS at 11:28

## 2022-04-28 RX ADMIN — SUGAMMADEX 200 MG: 100 INJECTION, SOLUTION INTRAVENOUS at 11:35

## 2022-04-28 RX ADMIN — ATORVASTATIN CALCIUM 20 MG: 20 TABLET, FILM COATED ORAL at 20:55

## 2022-04-28 RX ADMIN — BUPROPION HYDROCHLORIDE 150 MG: 150 TABLET, EXTENDED RELEASE ORAL at 20:55

## 2022-04-28 RX ADMIN — DOXAZOSIN 2 MG: 4 TABLET ORAL at 20:55

## 2022-04-28 RX ADMIN — LIDOCAINE HYDROCHLORIDE 20 MG: 10 INJECTION, SOLUTION EPIDURAL; INFILTRATION; INTRACAUDAL; PERINEURAL at 09:55

## 2022-04-28 RX ADMIN — SODIUM CHLORIDE, POTASSIUM CHLORIDE, SODIUM LACTATE AND CALCIUM CHLORIDE: 600; 310; 30; 20 INJECTION, SOLUTION INTRAVENOUS at 08:37

## 2022-04-28 RX ADMIN — HYDROMORPHONE HYDROCHLORIDE 0.5 MG: 1 INJECTION, SOLUTION INTRAMUSCULAR; INTRAVENOUS; SUBCUTANEOUS at 11:00

## 2022-04-28 RX ADMIN — FENTANYL CITRATE 50 MCG: 50 INJECTION, SOLUTION INTRAMUSCULAR; INTRAVENOUS at 10:54

## 2022-04-28 RX ADMIN — ROCURONIUM BROMIDE 50 MG: 10 INJECTION INTRAVENOUS at 09:56

## 2022-04-28 RX ADMIN — HYDROMORPHONE HYDROCHLORIDE: 10 INJECTION INTRAMUSCULAR; INTRAVENOUS; SUBCUTANEOUS at 13:02

## 2022-04-28 RX ADMIN — DEXAMETHASONE SODIUM PHOSPHATE 10 MG: 10 INJECTION INTRAMUSCULAR; INTRAVENOUS at 10:01

## 2022-04-28 RX ADMIN — ROPIVACAINE HYDROCHLORIDE 30 ML: 5 INJECTION, SOLUTION EPIDURAL; INFILTRATION; PERINEURAL at 09:11

## 2022-04-28 RX ADMIN — SODIUM CHLORIDE: 9 INJECTION, SOLUTION INTRAVENOUS at 16:04

## 2022-04-28 RX ADMIN — OXYBUTYNIN CHLORIDE 10 MG: 5 TABLET, EXTENDED RELEASE ORAL at 18:32

## 2022-04-28 RX ADMIN — ESCITALOPRAM OXALATE 5 MG: 10 TABLET ORAL at 18:35

## 2022-04-28 RX ADMIN — FENTANYL CITRATE 100 MCG: 50 INJECTION, SOLUTION INTRAMUSCULAR; INTRAVENOUS at 09:55

## 2022-04-28 RX ADMIN — FAMOTIDINE 20 MG: 20 TABLET ORAL at 18:34

## 2022-04-28 RX ADMIN — FENTANYL CITRATE 50 MCG: 50 INJECTION, SOLUTION INTRAMUSCULAR; INTRAVENOUS at 12:30

## 2022-04-28 RX ADMIN — HYDROMORPHONE HYDROCHLORIDE 0.3 MG: 1 INJECTION, SOLUTION INTRAMUSCULAR; INTRAVENOUS; SUBCUTANEOUS at 11:23

## 2022-04-28 RX ADMIN — MIDAZOLAM HYDROCHLORIDE 2 MG: 1 INJECTION, SOLUTION INTRAMUSCULAR; INTRAVENOUS at 09:11

## 2022-04-28 ASSESSMENT — PULMONARY FUNCTION TESTS
PIF_VALUE: 16
PIF_VALUE: 15
PIF_VALUE: 16
PIF_VALUE: 17
PIF_VALUE: 16
PIF_VALUE: 16
PIF_VALUE: 14
PIF_VALUE: 15
PIF_VALUE: 14
PIF_VALUE: 13
PIF_VALUE: 16
PIF_VALUE: 16
PIF_VALUE: 15
PIF_VALUE: 3
PIF_VALUE: 16
PIF_VALUE: 16
PIF_VALUE: 15
PIF_VALUE: 3
PIF_VALUE: 15
PIF_VALUE: 2
PIF_VALUE: 4
PIF_VALUE: 15
PIF_VALUE: 16
PIF_VALUE: 15
PIF_VALUE: 15
PIF_VALUE: 14
PIF_VALUE: 15
PIF_VALUE: 26
PIF_VALUE: 16
PIF_VALUE: 1
PIF_VALUE: 13
PIF_VALUE: 15
PIF_VALUE: 16
PIF_VALUE: 15
PIF_VALUE: 10
PIF_VALUE: 14
PIF_VALUE: 14
PIF_VALUE: 15
PIF_VALUE: 14
PIF_VALUE: 1
PIF_VALUE: 15
PIF_VALUE: 10
PIF_VALUE: 15
PIF_VALUE: 14
PIF_VALUE: 5
PIF_VALUE: 15
PIF_VALUE: 14
PIF_VALUE: 1
PIF_VALUE: 15
PIF_VALUE: 2
PIF_VALUE: 3
PIF_VALUE: 16
PIF_VALUE: 17
PIF_VALUE: 15
PIF_VALUE: 16
PIF_VALUE: 15
PIF_VALUE: 5
PIF_VALUE: 14
PIF_VALUE: 15
PIF_VALUE: 5
PIF_VALUE: 14
PIF_VALUE: 16
PIF_VALUE: 15
PIF_VALUE: 6
PIF_VALUE: 15
PIF_VALUE: 25
PIF_VALUE: 15
PIF_VALUE: 25
PIF_VALUE: 3
PIF_VALUE: 17
PIF_VALUE: 6
PIF_VALUE: 19
PIF_VALUE: 15
PIF_VALUE: 14
PIF_VALUE: 15
PIF_VALUE: 11
PIF_VALUE: 16
PIF_VALUE: 4
PIF_VALUE: 16
PIF_VALUE: 16
PIF_VALUE: 3
PIF_VALUE: 15
PIF_VALUE: 14
PIF_VALUE: 15
PIF_VALUE: 14
PIF_VALUE: 15
PIF_VALUE: 16
PIF_VALUE: 14
PIF_VALUE: 14
PIF_VALUE: 15
PIF_VALUE: 14
PIF_VALUE: 15
PIF_VALUE: 3
PIF_VALUE: 15
PIF_VALUE: 3
PIF_VALUE: 15
PIF_VALUE: 14
PIF_VALUE: 11
PIF_VALUE: 16
PIF_VALUE: 16
PIF_VALUE: 15
PIF_VALUE: 14
PIF_VALUE: 17
PIF_VALUE: 15
PIF_VALUE: 15
PIF_VALUE: 17
PIF_VALUE: 16
PIF_VALUE: 12
PIF_VALUE: 3
PIF_VALUE: 3
PIF_VALUE: 14
PIF_VALUE: 15
PIF_VALUE: 15
PIF_VALUE: 3
PIF_VALUE: 16
PIF_VALUE: 18
PIF_VALUE: 16

## 2022-04-28 ASSESSMENT — PAIN SCALES - GENERAL
PAINLEVEL_OUTOF10: 7
PAINLEVEL_OUTOF10: 0
PAINLEVEL_OUTOF10: 7
PAINLEVEL_OUTOF10: 5
PAINLEVEL_OUTOF10: 5
PAINLEVEL_OUTOF10: 7
PAINLEVEL_OUTOF10: 6
PAINLEVEL_OUTOF10: 7
PAINLEVEL_OUTOF10: 5

## 2022-04-28 ASSESSMENT — PAIN DESCRIPTION - DESCRIPTORS
DESCRIPTORS: ACHING
DESCRIPTORS: ACHING

## 2022-04-28 ASSESSMENT — PAIN - FUNCTIONAL ASSESSMENT: PAIN_FUNCTIONAL_ASSESSMENT: 0-10

## 2022-04-28 ASSESSMENT — PAIN DESCRIPTION - LOCATION
LOCATION: CHEST
LOCATION: CHEST

## 2022-04-28 ASSESSMENT — PAIN DESCRIPTION - ORIENTATION
ORIENTATION: RIGHT
ORIENTATION: RIGHT

## 2022-04-28 NOTE — ANESTHESIA POSTPROCEDURE EVALUATION
Department of Anesthesiology  Postprocedure Note    Patient: Scar Andrew  MRN: [id number]  YOB: 1950  Date of evaluation: 4/28/2022  Time:  12:04 PM     Procedure Summary     Date: 04/28/22 [place] [facility] [place] [facility]    Anesthesia Start: 5059 Anesthesia Stop: 5665    Procedure: RIGHT THORASCOPIC UPPER LOBE WEDGE WITH COMPLETION LOBECTOMY (Right Chest) Diagnosis: (LUNG NODULE)    Surgeons: Miki Brice MD Responsible Provider: Zulay Fuentes MD    Anesthesia Type: general ASA Status: 3          Anesthesia Type: general    Josep Phase I: Josep Score: 10    Josep Phase II:      Last vitals: Reviewed and per EMR flowsheets.        Anesthesia Post Evaluation    Patient location during evaluation: bedside  Patient participation: complete - patient participated  Level of consciousness: awake and awake and alert  Pain score: 0  Airway patency: patent  Nausea & Vomiting: no nausea and no vomiting  Complications: no  Cardiovascular status: blood pressure returned to baseline and hemodynamically stable  Respiratory status: acceptable and face mask  Hydration status: euvolemic

## 2022-04-28 NOTE — ANESTHESIA PRE PROCEDURE
Department of Anesthesiology  Preprocedure Note       Name:  Stephen Ignacio   Age:  71 y.o.  :  1950                                          MRN:  [id number]         Date:  2022      Surgeon: Trinidad Daly):  Gretchen Tyson MD    Procedure: Procedure(s):  RIGHT THORASCOPIC UPPER LOBE WEDGE POSSIBLE LOBECTOMY    Medications prior to admission:   Prior to Admission medications    Medication Sig Start Date End Date Taking? Authorizing Provider   buPROPion (WELLBUTRIN SR) 150 MG extended release tablet Take 150 mg by mouth 2 times daily    Historical Provider, MD   escitalopram (LEXAPRO) 5 MG tablet Take 5 mg by mouth daily    Historical Provider, MD   vitamin B-12 (CYANOCOBALAMIN) 1000 MCG tablet Take 1,000 mcg by mouth daily    Historical Provider, MD   empagliflozin (JARDIANCE) 25 MG tablet Take 25 mg by mouth daily    Historical Provider, MD   spironolactone (ALDACTONE) 25 MG tablet Take 25 mg by mouth daily    Historical Provider, MD   chlorthalidone (HYGROTON) 25 MG tablet Take 25 mg by mouth daily    Historical Provider, MD   Exenatide ER (BYDUREON BCISE) 2 MG/0.85ML AUIJ Inject into the skin    Historical Provider, MD   terconazole (TERAZOL 3) 0.8 % vaginal cream Place vaginally nightly for 3 days 16   Edith Hashimoto, APRN - CNP   magnesium (MAGNESIUM-OXIDE) 250 MG TABS tablet Take 250 mg by mouth daily    Historical Provider, MD   atorvastatin (LIPITOR) 20 MG tablet Take 20 mg by mouth daily    Historical Provider, MD   omeprazole (PRILOSEC) 20 MG capsule Take 20 mg by mouth daily. Historical Provider, MD   potassium chloride (MICRO-K) 10 MEQ CR capsule Take 10 mEq by mouth 2 times daily. Historical Provider, MD   oxybutynin (DITROPAN-XL) 10 MG CR tablet Take 10 mg by mouth daily. Historical Provider, MD   doxazosin (CARDURA) 2 MG tablet Take 2 mg by mouth nightly.     Historical Provider, MD   metFORMIN (GLUCOPHAGE) 500 MG tablet Take 1,000 mg by mouth 2 times daily (with meals)     Historical Provider, MD   glipiZIDE (GLUCOTROL) 5 MG tablet Take 5 mg by mouth 2 times daily (before meals). Historical Provider, MD   aspirin 81 MG tablet Take 81 mg by mouth daily. Historical Provider, MD   calcium carbonate 600 MG TABS tablet Take 1 tablet by mouth daily. Historical Provider, MD   Cholecalciferol (VITAMIN D) 5000 UNITS CAPS capsule Take 5,000 Units by mouth daily.     Historical Provider, MD       Current medications:    Current Facility-Administered Medications   Medication Dose Route Frequency Provider Last Rate Last Admin    0.9 % sodium chloride infusion   IntraVENous PRN Benjamin Boron, APRN - CNP        lactated ringers infusion   IntraVENous Continuous Benjamin Boron, APRN - CNP        lidocaine PF 1 % injection 1 mL  1 mL IntraDERmal Once PRN Benjamin Boron, APRN - CNP        sodium chloride flush 0.9 % injection 5-40 mL  5-40 mL IntraVENous 2 times per day Benjamin Boron, APRN - CNP        sodium chloride flush 0.9 % injection 5-40 mL  5-40 mL IntraVENous PRN Benjamin Boron, APRN - CNP        ceFAZolin (ANCEF) 2000 mg in dextrose 5 % 100 mL IVPB  2,000 mg IntraVENous Once Afua Araujo MD        sodium chloride flush 0.9 % injection 5-40 mL  5-40 mL IntraVENous 2 times per day Kel Quezada MD        sodium chloride flush 0.9 % injection 5-40 mL  5-40 mL IntraVENous PRN Kel uQezada MD        0.9 % sodium chloride infusion  25 mL IntraVENous PRN Kel Quezada MD        meperidine (DEMEROL) injection 12.5 mg  12.5 mg IntraVENous Once PRN Kel Quezada MD        fentaNYL (SUBLIMAZE) injection 50 mcg  50 mcg IntraVENous Q10 Min PRN Kel Quezada MD        ondansetron [facility]) injection 4 mg  4 mg IntraVENous Once PRN Kel Quezada MD        metoclopramide [facility]) injection 10 mg  10 mg IntraVENous Once PRN Kel Quezada MD        diphenhydrAMINE (BENADRYL) injection 12.5 mg  12.5 mg IntraVENous Once PRN Chay Varghese MD           Allergies: Allergies   Allergen Reactions    Accupril [Quinapril Hcl]     Sulfa Antibiotics     Vibramycin [Doxycycline Calcium]     Amoxicillin Rash    Codeine Nausea And Vomiting    Erythromycin Rash    Volmax [Albuterol] Anxiety       Problem List:  There is no problem list on file for this patient. Past Medical History:        Diagnosis Date    Arthritis     Diabetes mellitus ([address]     GERD (gastroesophageal reflux disease)     Hypertension     Overactive bladder     Urinary urgency        Past Surgical History:        Procedure Laterality Date     SECTION      X1    CYST REMOVAL      head    DILATION AND CURETTAGE      X2    EYE SURGERY         Social History:    Social History     Tobacco Use    Smoking status: Never Smoker    Smokeless tobacco: Never Used   Substance Use Topics    Alcohol use: No                                Counseling given: Not Answered      Vital Signs (Current):   Vitals:    22 0752   BP: 127/68   Pulse: 95   Resp: 16   Temp: 97.6 °F (36.4 °C)   TempSrc: Temporal   SpO2: 97%   Weight: 159 lb (72.1 kg)   Height: 5' 2\" (1.575 m)                                              BP Readings from Last 3 Encounters:   22 127/68   22 136/72   16 (!) 207/126       NPO Status: Time of last liquid consumption: 0000                        Time of last solid consumption:                         Date of last liquid consumption: 22                        Date of last solid food consumption: 22    BMI:   Wt Readings from Last 3 Encounters:   22 159 lb (72.1 kg)   22 159 lb 9.6 oz (72.4 kg)   22 162 lb (73.5 kg)     Body mass index is 29.08 kg/m².     CBC:   Lab Results   Component Value Date    WBC 6.6 2022    RBC 4.06 2022    HGB 13.0 2022    HCT 39.1 2022    MCV 96.4 2022    RDW 12.9 2022     2022       CMP: Lab Results   Component Value Date     04/26/2022    K 4.1 04/26/2022     04/26/2022    CO2 19 04/26/2022    BUN 24 04/26/2022    CREATININE 1.11 04/26/2022    GFRAA 58.6 04/26/2022    LABGLOM 48.4 04/26/2022    GLUCOSE 103 04/26/2022    CALCIUM 9.9 04/26/2022       POC Tests: No results for input(s): POCGLU, POCNA, POCK, POCCL, POCBUN, POCHEMO, POCHCT in the last 72 hours. Coags: No results found for: PROTIME, INR, APTT    HCG (If Applicable): No results found for: PREGTESTUR, PREGSERUM, HCG, HCGQUANT     ABGs: No results found for: PHART, PO2ART, PXA0ZCH, MOY6CTQ, BEART, B6BGKOVZ     Type & Screen (If Applicable):  No results found for: LABABO, LABRH    Drug/Infectious Status (If Applicable):  No results found for: HIV, HEPCAB    COVID-19 Screening (If Applicable): No results found for: COVID19        Anesthesia Evaluation  Patient summary reviewed and Nursing notes reviewed no history of anesthetic complications:   Airway: Mallampati: II  TM distance: >3 FB   Neck ROM: full  Mouth opening: > = 3 FB Dental: normal exam         Pulmonary:Negative Pulmonary ROS and normal exam                               Cardiovascular:    (+) hypertension:,                   Neuro/Psych:   Negative Neuro/Psych ROS              GI/Hepatic/Renal:   (+) GERD:,           Endo/Other:    (+) Diabetes, . Abdominal:             Vascular: negative vascular ROS. Other Findings:             Anesthesia Plan      general     ASA 3       Induction: intravenous. arterial line  MIPS: Prophylactic antiemetics administered. Anesthetic plan and risks discussed with patient. Plan discussed with CRNA.     Attending anesthesiologist reviewed and agrees with Preprocedure content              Meagan Bernal MD   4/28/2022

## 2022-04-28 NOTE — FLOWSHEET NOTE
Report received from Rosie Montiel, [place]. Assumed care of pt. Pt is resting quietly in bed. VSS. Respirations even and nonlabored. 2LNC in use. Rt side chest tube intact and hooked to atrium. Red colored drainage in tube. Pt is slightly drowsy. Awakens easily, responds appropriately, follows commands. Her sister is here. RT was in to see pt.

## 2022-04-28 NOTE — PROGRESS NOTES
Pharmacy Note - Renal dose adjustment made per P/T protocol    Original order:  Famotidine 20mg BID    Estimated Creatinine Clearance: 43 mL/min (A) (based on SCr of 1.11 mg/dL (H)). Recent Labs     04/26/22  1538 04/26/22  1539   BUN 24*  --    CREATININE 1.11*  --    PLT  --  279       Renally adjusted order:  Famotidine 20mg daily    Please call pharmacy with any questions.     Thank you,  Cl Cadet, [facility]  4/28/2022 6:07 PM

## 2022-04-28 NOTE — H&P
Update History & Physical    I examined the patient and reviewed the History and Physical and there were no significant changes. Vitals:    04/28/22 0752   BP: 127/68   Pulse: 95   Resp: 16   Temp: 97.6 °F (36.4 °C)   SpO2: 97%     Principal Problem:    Status post lobectomy of lung  Resolved Problems:    * No resolved hospital problems. *        Plan: The risk, benefits, expected outcome, and alternative to the recommended procedure have been discussed with the patient. Patient understands and wants to proceed with the procedure.     Electronically signed by Andreina Lucio MD on 4/28/22 at 8:12 AM EDT

## 2022-04-28 NOTE — ANESTHESIA PROCEDURE NOTES
Peripheral Block    Patient location during procedure: pre-op  Staffing  Performed: anesthesiologist   Anesthesiologist: Shreyas Huerta MD  Preanesthetic Checklist  Completed: patient identified, IV checked, site marked, risks and benefits discussed, surgical consent, monitors and equipment checked, pre-op evaluation, timeout performed, anesthesia consent given, oxygen available and patient being monitored  Peripheral Block  Patient position: sitting  Prep: ChloraPrep  Patient monitoring: cardiac monitor, continuous pulse ox, frequent blood pressure checks, IV access and oxygen  Block type: Erector spinae  Laterality: right  Injection technique: single-shot  Guidance: ultrasound guided  Infiltration strength: 0.5 %  Dose: 30 mL  Provider prep: mask and sterile gloves  Needle  Needle type: insulated echogenic nerve stimulator needle   Needle gauge: 21 G  Needle length: 10 cm  Needle localization: ultrasound guidance  Assessment  Injection assessment: negative aspiration for heme, no paresthesia on injection, local visualized surrounding nerve on ultrasound and no intravascular symptoms  Slow fractionated injection: yes  Hemodynamics: stableOutcomes: patient tolerated procedure well  Reason for block: post-op pain management

## 2022-04-28 NOTE — ANESTHESIA PROCEDURE NOTES
Arterial Line:    An arterial line was placed using surface landmarks, in the pre-op for the following indication(s): continuous blood pressure monitoring. A 20 gauge (size), 1 and 3/8 inch (length), Arrow (type) catheter was placed, Seldinger technique used, into the right radial artery, secured by tape and Tegaderm. Anesthesia type: Local  Local infiltration: Injection    Events:  patient tolerated procedure well with no complications and EBL 0mL.   Anesthesiologist: Elle Galarza MD  Preanesthetic Checklist  Completed: patient identified, IV checked, site marked, risks and benefits discussed, surgical consent, monitors and equipment checked, pre-op evaluation, timeout performed, anesthesia consent given, oxygen available and patient being monitored

## 2022-04-28 NOTE — OP NOTE
Operative Note      Patient: Olena Andrew  YOB: 1950  MRN: [id number]    Date of Procedure: 4/28/2022    Pre-Op Diagnosis: LUNG NODULE    Post-Op Diagnosis: Lung cancer       Procedure(s):  RIGHT THORASCOPIC UPPER LOBE WEDGE WITH LOBECTOMY, flexible bronchoscopy, mediastinal lymph node dissection    Surgeon(s):  Ligia Smith MD    Assistant:   First Assistant: Dutch Robert    Anesthesia: General    Estimated Blood Loss (mL): less than 50     Complications: None    Specimens:   ID Type Source Tests Collected by Time Destination   A : upper lobe wedge Tissue Lung SURGICAL PATHOLOGY Ligia Smith MD 4/28/2022 1011    B : 3A Tissue Lymph Node SURGICAL PATHOLOGY Ligia Smith MD 4/28/2022 1020    C : 11R Tissue Lymph Node SURGICAL PATHOLOGY Ligia Smith MD 4/28/2022 1035    D : right upper lobe Tissue Lung SURGICAL PATHOLOGY Ligia Smith MD 4/28/2022 1042    E : LYMPH NODE 10 R Tissue Lymph Node SURGICAL PATHOLOGY Ligia Smith MD 4/28/2022 1108    F : LYMPH NODE 4 R Tissue Lymph Node SURGICAL PATHOLOGY Ligia Smith MD 4/28/2022 1112    G : LYMPH NODE LEVEL 7 Tissue Lymph Node SURGICAL PATHOLOGY Ligia Smith MD 4/28/2022 1118        Implants:  * No implants in log *      Drains:   Chest Tube Right 1 (Active)       Findings: Neoplasm suspicious for malignancy    Detailed Description of Procedure:   Patient was found to have a nodule in her right upper lobe that slowly increased over couple of years. He had spiculations. A PET scan showed minimal activity but due to the increase in size she was referred for surgical evaluation. Patient agreed with resection for diagnostic and potentially curative purposes. Once patient was intubated a flexible bronchoscopy was performed which showed no endobronchial tumor deposits or obstructions. She was then turned in the right-sided position and had her chest prepped and draped in sterile fashion.   2 12 mm thoracoscopic ports were then placed over her lower rib cage and a 3 cm utility incision was made at about the fourth interspace. Examination of the hemithorax showed no adhesions or carcinomatosis. Her lungs were pink and very healthy-appearing. She had an absent minor fissure. The nodule was felt in the periphery of the upper lobe and using multiple firings of a reinforced stapler the area was wedged off with grossly negative margins. The specimen was placed in a bag and removed and sent to pathology where examination found neoplasm suspicious for malignancy. A completion lobectomy was performed. The anterior and posterior pleural reflections were then taken down. The infrapulmonary ligament was dissected up to the level of the lower lobe vein. No level 9 lymph nodes were seen. The subcarinal space was opened and level 7 lymph nodes were removed. The venous drainage to the upper lobe was dissected free from the underlying artery and transected with a vascular stapler. We then dissected out the individual arterial trunks to the upper lobe and transected these with a vascular stapler as well. The upper lobe bronchus was stripped free from the surrounding lymphatic tissue and transected with a heavy stapler. We then gently ventilated the lung to outline the border of the upper and middle lobe. We then used multiple firings of heavy reinforce staplers to transect the parenchyma  the rest of the upper lobe. The upper lobe was placed in a bag and removed. Inferior to the azygos vein level 10 R lymph nodes were biopsied. Superior to the azygos vein level 4R lymph node chain was removed. An anterior mediastinum level 3 a lymph nodes were removed. The hemithorax was then irrigated with 2 L of sterile water. Once all fluid was aspirated out 1 chest tube was placed. The lung was reinflated and remaining incisions were closed in layers.     Electronically signed by Violette Avalos MD on 4/28/2022 at 11:45 AM

## 2022-04-29 LAB
ANION GAP SERPL CALCULATED.3IONS-SCNC: 15 MEQ/L (ref 9–15)
BUN BLDV-MCNC: 28 MG/DL (ref 8–23)
CALCIUM SERPL-MCNC: 9.1 MG/DL (ref 8.5–9.9)
CHLORIDE BLD-SCNC: 103 MEQ/L (ref 95–107)
CO2: 20 MEQ/L (ref 20–31)
CREAT SERPL-MCNC: 1.17 MG/DL (ref 0.5–0.9)
GFR AFRICAN AMERICAN: 55.1
GFR NON-AFRICAN AMERICAN: 45.6
GLUCOSE BLD-MCNC: 130 MG/DL (ref 70–99)
GLUCOSE BLD-MCNC: 134 MG/DL (ref 70–99)
GLUCOSE BLD-MCNC: 142 MG/DL (ref 70–99)
GLUCOSE BLD-MCNC: 168 MG/DL (ref 70–99)
GLUCOSE BLD-MCNC: 229 MG/DL (ref 70–99)
HCT VFR BLD CALC: 32.7 % (ref 37–47)
HEMOGLOBIN: 11 G/DL (ref 12–16)
MCH RBC QN AUTO: 32.1 PG (ref 27–31.3)
MCHC RBC AUTO-ENTMCNC: 33.5 % (ref 33–37)
MCV RBC AUTO: 95.8 FL (ref 82–100)
PDW BLD-RTO: 13 % (ref 11.5–14.5)
PERFORMED ON: ABNORMAL
PLATELET # BLD: 243 K/UL (ref 130–400)
POTASSIUM REFLEX MAGNESIUM: 4.3 MEQ/L (ref 3.4–4.9)
RBC # BLD: 3.41 M/UL (ref 4.2–5.4)
SODIUM BLD-SCNC: 138 MEQ/L (ref 135–144)
WBC # BLD: 7 K/UL (ref 4.8–10.8)

## 2022-04-29 PROCEDURE — 2060000000 HC ICU INTERMEDIATE R&B

## 2022-04-29 PROCEDURE — 2700000000 HC OXYGEN THERAPY PER DAY

## 2022-04-29 PROCEDURE — 6360000002 HC RX W HCPCS: Performed by: THORACIC SURGERY (CARDIOTHORACIC VASCULAR SURGERY)

## 2022-04-29 PROCEDURE — 6370000000 HC RX 637 (ALT 250 FOR IP): Performed by: THORACIC SURGERY (CARDIOTHORACIC VASCULAR SURGERY)

## 2022-04-29 PROCEDURE — 2500000003 HC RX 250 WO HCPCS: Performed by: THORACIC SURGERY (CARDIOTHORACIC VASCULAR SURGERY)

## 2022-04-29 PROCEDURE — 94761 N-INVAS EAR/PLS OXIMETRY MLT: CPT

## 2022-04-29 PROCEDURE — 85027 COMPLETE CBC AUTOMATED: CPT

## 2022-04-29 PROCEDURE — 2580000003 HC RX 258: Performed by: THORACIC SURGERY (CARDIOTHORACIC VASCULAR SURGERY)

## 2022-04-29 PROCEDURE — 6370000000 HC RX 637 (ALT 250 FOR IP): Performed by: NURSE PRACTITIONER

## 2022-04-29 PROCEDURE — 80048 BASIC METABOLIC PNL TOTAL CA: CPT

## 2022-04-29 PROCEDURE — 36415 COLL VENOUS BLD VENIPUNCTURE: CPT

## 2022-04-29 RX ORDER — DEXTROSE MONOHYDRATE 25 G/50ML
12.5 INJECTION, SOLUTION INTRAVENOUS PRN
Status: DISCONTINUED | OUTPATIENT
Start: 2022-04-29 | End: 2022-05-01

## 2022-04-29 RX ORDER — NICOTINE POLACRILEX 4 MG
15 LOZENGE BUCCAL PRN
Status: DISCONTINUED | OUTPATIENT
Start: 2022-04-29 | End: 2022-05-01

## 2022-04-29 RX ORDER — GLIPIZIDE 2.5 MG/1
2.5 TABLET, EXTENDED RELEASE ORAL DAILY
COMMUNITY

## 2022-04-29 RX ORDER — DEXTROSE MONOHYDRATE 50 MG/ML
100 INJECTION, SOLUTION INTRAVENOUS PRN
Status: DISCONTINUED | OUTPATIENT
Start: 2022-04-29 | End: 2022-05-01 | Stop reason: HOSPADM

## 2022-04-29 RX ORDER — INSULIN LISPRO 100 [IU]/ML
0-12 INJECTION, SOLUTION INTRAVENOUS; SUBCUTANEOUS
Status: DISCONTINUED | OUTPATIENT
Start: 2022-04-29 | End: 2022-05-01 | Stop reason: HOSPADM

## 2022-04-29 RX ORDER — GLIPIZIDE 5 MG/1
2.5 TABLET ORAL
Status: DISCONTINUED | OUTPATIENT
Start: 2022-04-29 | End: 2022-05-01 | Stop reason: HOSPADM

## 2022-04-29 RX ORDER — INSULIN LISPRO 100 [IU]/ML
0-6 INJECTION, SOLUTION INTRAVENOUS; SUBCUTANEOUS NIGHTLY
Status: DISCONTINUED | OUTPATIENT
Start: 2022-04-29 | End: 2022-05-01 | Stop reason: HOSPADM

## 2022-04-29 RX ORDER — OXYCODONE HYDROCHLORIDE 5 MG/1
5 TABLET ORAL EVERY 4 HOURS PRN
Status: DISCONTINUED | OUTPATIENT
Start: 2022-04-29 | End: 2022-05-01 | Stop reason: HOSPADM

## 2022-04-29 RX ADMIN — ENOXAPARIN SODIUM 40 MG: 100 INJECTION SUBCUTANEOUS at 09:55

## 2022-04-29 RX ADMIN — BUPROPION HYDROCHLORIDE 150 MG: 150 TABLET, EXTENDED RELEASE ORAL at 21:53

## 2022-04-29 RX ADMIN — OXYCODONE 5 MG: 5 TABLET ORAL at 09:57

## 2022-04-29 RX ADMIN — SPIRONOLACTONE 25 MG: 25 TABLET ORAL at 09:57

## 2022-04-29 RX ADMIN — OXYCODONE 5 MG: 5 TABLET ORAL at 17:57

## 2022-04-29 RX ADMIN — ATORVASTATIN CALCIUM 20 MG: 20 TABLET, FILM COATED ORAL at 21:53

## 2022-04-29 RX ADMIN — POTASSIUM CHLORIDE 10 MEQ: 750 TABLET, FILM COATED, EXTENDED RELEASE ORAL at 09:57

## 2022-04-29 RX ADMIN — Medication 5 ML: at 22:00

## 2022-04-29 RX ADMIN — OXYCODONE 5 MG: 5 TABLET ORAL at 21:52

## 2022-04-29 RX ADMIN — Medication 400 MG: at 09:57

## 2022-04-29 RX ADMIN — DOXAZOSIN 2 MG: 4 TABLET ORAL at 21:53

## 2022-04-29 RX ADMIN — POTASSIUM CHLORIDE 10 MEQ: 750 TABLET, FILM COATED, EXTENDED RELEASE ORAL at 21:56

## 2022-04-29 RX ADMIN — CHLORTHALIDONE 25 MG: 25 TABLET ORAL at 09:57

## 2022-04-29 RX ADMIN — ESCITALOPRAM OXALATE 5 MG: 10 TABLET ORAL at 09:57

## 2022-04-29 RX ADMIN — INSULIN LISPRO 1 UNITS: 100 INJECTION, SOLUTION INTRAVENOUS; SUBCUTANEOUS at 22:01

## 2022-04-29 RX ADMIN — SODIUM CHLORIDE, PRESERVATIVE FREE 20 MG: 5 INJECTION INTRAVENOUS at 09:56

## 2022-04-29 RX ADMIN — BUPROPION HYDROCHLORIDE 150 MG: 150 TABLET, EXTENDED RELEASE ORAL at 09:57

## 2022-04-29 RX ADMIN — OXYBUTYNIN CHLORIDE 10 MG: 5 TABLET, EXTENDED RELEASE ORAL at 09:56

## 2022-04-29 ASSESSMENT — PAIN SCALES - GENERAL
PAINLEVEL_OUTOF10: 8
PAINLEVEL_OUTOF10: 6
PAINLEVEL_OUTOF10: 7
PAINLEVEL_OUTOF10: 6

## 2022-04-29 ASSESSMENT — PAIN DESCRIPTION - LOCATION
LOCATION: BACK
LOCATION: CHEST;INCISION

## 2022-04-29 ASSESSMENT — PAIN DESCRIPTION - ORIENTATION: ORIENTATION: RIGHT;POSTERIOR

## 2022-04-29 NOTE — PROGRESS NOTES
Pt doing well POD1 from lobectomy. No complaints of pain. Chest tube with moderate air leak with cough. Water seal chest tube. Encourage ambulation. Oral pain meds.

## 2022-04-29 NOTE — CARE COORDINATION
[facility] Case Management Initial Discharge Assessment    Met with Patient to discuss discharge plan. PCP: Matias Segura                                Date of Last Visit:                                                                                  2+ MONTHS AGO    [facility] Patient: No        [facility] Notified: no    If no PCP, list provided? N/A    Discharge Planning    Living Arrangements: independently at home    Who do you live with? ALONE    Who helps you with your care:  self    If lives at home:     Do you have any barriers navigating in your home? no    Patient can perform ADL? Yes    Current Services (outpatient and in home) :  None    Dialysis: No    Is transportation available to get to your appointments? Yes    DME Equipment:  no    Respiratory equipment: None    Respiratory provider:  no     Pharmacy:  yes - [facility]/ [facility] with Medication Assistance Program?  No      Patient agreeable to [address]? No    Patient agreeable to SNF/Rehab? No    Other discharge needs identified? N/A    Does Patient Have a High-Risk for Readmission Diagnosis (CHF, PN, MI, COPD)? No         Initial Discharge Plan? (Note: please see concurrent daily documentation for any updates after initial note). PATIENT IS ALERT, ORIENTED , PLEASANT. PATIENT PLANS TO RETURN HOME UPON DISCHARGE. PATIENT DENIES NEEDS.      Readmission Risk              Risk of Unplanned Readmission:  [address] [place] [address]  Electronically signed by Tony Riley MSW, LSW on 4/29/2022 at 11:10 AM

## 2022-04-29 NOTE — CONSULTS
Consult Note    Reason for Consult:  DM nanagement    Requesting Physician:  Mariya Pappas MD    HISTORY OF PRESENT ILLNESS:    The patient is a 71 y.o. female who is admitted under the thoracic surgery service. Underwent RUL wedge with lobectomy secondary to lung nodule in the setting of lung cancer. Has PMH of arthritis, DM2, GERD, lung CA and HTN. Upon exam, patient denies SOB CP, N/V/D. C/o mild pain to CT site. CT patent. We are consulted to assist with DM in the post-operative period. Past Medical History:   Diagnosis Date    Arthritis     Diabetes mellitus ([address]     GERD (gastroesophageal reflux disease)     Hypertension     Overactive bladder     Urinary urgency        Past Surgical History:   Procedure Laterality Date     SECTION      X1    CYST REMOVAL      head    DILATION AND CURETTAGE      X2    EYE SURGERY      THORACOSCOPY Right 2022    RIGHT THORASCOPIC UPPER LOBE WEDGE WITH COMPLETION LOBECTOMY performed by Mariya Pappas MD at [facility]       Prior to Admission medications    Medication Sig Start Date End Date Taking?  Authorizing Provider   buPROPion (WELLBUTRIN SR) 150 MG extended release tablet Take 150 mg by mouth 2 times daily    Historical Provider, MD   escitalopram (LEXAPRO) 5 MG tablet Take 5 mg by mouth daily    Historical Provider, MD   vitamin B-12 (CYANOCOBALAMIN) 1000 MCG tablet Take 1,000 mcg by mouth daily    Historical Provider, MD   empagliflozin (JARDIANCE) 25 MG tablet Take 25 mg by mouth daily    Historical Provider, MD   spironolactone (ALDACTONE) 25 MG tablet Take 25 mg by mouth daily    Historical Provider, MD   chlorthalidone (HYGROTON) 25 MG tablet Take 25 mg by mouth daily    Historical Provider, MD   Exenatide ER (BYDUREON BCISE) 2 MG/0.85ML AUIJ Inject into the skin    Historical Provider, MD   terconazole (TERAZOL 3) 0.8 % vaginal cream Place vaginally nightly for 3 days 16   TULIO Loco - CNP   magnesium (MAGNESIUM-OXIDE) 250 MG TABS tablet Take 250 mg by mouth daily    Historical Provider, MD   atorvastatin (LIPITOR) 20 MG tablet Take 20 mg by mouth daily    Historical Provider, MD   omeprazole (PRILOSEC) 20 MG capsule Take 20 mg by mouth daily. Historical Provider, MD   potassium chloride (MICRO-K) 10 MEQ CR capsule Take 10 mEq by mouth 2 times daily. Historical Provider, MD   oxybutynin (DITROPAN-XL) 10 MG CR tablet Take 10 mg by mouth daily. Historical Provider, MD   doxazosin (CARDURA) 2 MG tablet Take 2 mg by mouth nightly. Historical Provider, MD   metFORMIN (GLUCOPHAGE) 500 MG tablet Take 1,000 mg by mouth 2 times daily (with meals)     Historical Provider, MD   glipiZIDE (GLUCOTROL) 5 MG tablet Take 5 mg by mouth 2 times daily (before meals). Historical Provider, MD   aspirin 81 MG tablet Take 81 mg by mouth daily. Historical Provider, MD   calcium carbonate 600 MG TABS tablet Take 1 tablet by mouth daily. Patient not taking: Reported on 4/28/2022    Historical Provider, MD   Cholecalciferol (VITAMIN D) 5000 UNITS CAPS capsule Take 5,000 Units by mouth daily.     Historical Provider, MD       Scheduled Meds:   spironolactone  25 mg Oral Daily    potassium chloride  10 mEq Oral BID    oxybutynin  10 mg Oral Daily    metFORMIN  1,000 mg Oral BID WC    magnesium oxide  400 mg Oral Daily    glipiZIDE  5 mg Oral BID AC    escitalopram  5 mg Oral Daily    empagliflozin  25 mg Oral Daily    doxazosin  2 mg Oral Nightly    chlorthalidone  25 mg Oral Daily    buPROPion  150 mg Oral BID    atorvastatin  20 mg Oral Nightly    sodium chloride flush  5-40 mL IntraVENous 2 times per day    enoxaparin  40 mg SubCUTAneous Daily    famotidine  20 mg Oral Daily    Or    famotidine (PEPCID) injection  20 mg IntraVENous Daily     Continuous Infusions:   sodium chloride      dextrose       PRN Meds:oxyCODONE, sodium chloride flush, sodium chloride, ondansetron **OR** ondansetron, naloxone, magnesium sulfate, acetaminophen, polyethylene glycol, glucose, dextrose, glucagon (rDNA), dextrose, ketorolac    Allergies   Allergen Reactions    Accupril [Quinapril Hcl]     Sulfa Antibiotics     Vibramycin [Doxycycline Calcium]     Amoxicillin Rash    Codeine Nausea And Vomiting    Erythromycin Rash    Volmax [Albuterol] Anxiety       Social History     Socioeconomic History    Marital status:      Spouse name: Not on file    Number of children: Not on file    Years of education: Not on file    Highest education level: Not on file   Occupational History    Not on file   Tobacco Use    Smoking status: Never Smoker    Smokeless tobacco: Never Used   Substance and Sexual Activity    Alcohol use: No    Drug use: No    Sexual activity: Never   Other Topics Concern    Not on file   Social History Narrative    Not on file     Social Determinants of Health     Financial Resource Strain:     Difficulty of Paying Living Expenses: Not on file   Food Insecurity:     Worried About Running Out of Food in the Last Year: Not on file    Terra of Food in the Last Year: Not on file   Transportation Needs:     Lack of Transportation (Medical): Not on file    Lack of Transportation (Non-Medical):  Not on file   Physical Activity:     Days of Exercise per Week: Not on file    Minutes of Exercise per Session: Not on file   Stress:     Feeling of Stress : Not on file   Social Connections:     Frequency of Communication with Friends and Family: Not on file    Frequency of Social Gatherings with Friends and Family: Not on file    Attends Restorationist Services: Not on file    Active Member of Clubs or Organizations: Not on file    Attends Club or Organization Meetings: Not on file    Marital Status: Not on file   Intimate Partner Violence:     Fear of Current or Ex-Partner: Not on file    Emotionally Abused: Not on file    Physically Abused: Not on file    Sexually Abused: Not on file   Housing Stability:     Unable to Pay for Housing in the Last Year: Not on file    Number of Places Lived in the Last Year: Not on file    Unstable Housing in the Last Year: Not on file       Family History   Problem Relation Age of Onset    Diabetes Maternal Grandfather     Cancer Father     Cancer Mother        Review Of Systems:   12 point ROS negative unless indicated below or in the HPI    Physical Exam:  Vitals:    04/28/22 1948 04/29/22 0024 04/29/22 0344 04/29/22 0803   BP:  131/60  (!) 118/52   Pulse:  86  80   Resp: 18 16 17 19   Temp:  98.1 °F (36.7 °C)  98.4 °F (36.9 °C)   TempSrc:    Oral   SpO2: 96% 97% 96% 97%   Weight:       Height:           CONSTITUTIONAL:  awake, alert, cooperative, no apparent distress, and appears stated age  HEENT: Atraumatic, conjunctive clear, nares clear, good dentition  LUNGS:  Cear to auscultation bilaterally, no crackles or wheezing  CARDIOVASCULAR: Regular rate and rhythm, normal S1 and S2  ABDOMEN:  No scars, normal bowel sounds, soft, non-distended, non-tender, no masses palpated, no hepatosplenomegally  MUSCULOSKELETAL:  There is no redness, warmth, or swelling of the joints. NEUROLOGIC:  Awake, alert, oriented to name, place and time. SKIN:  Warm and dry. R CT site dressing intact.     Labs:  Recent Results (from the past 24 hour(s))   POCT Glucose    Collection Time: 04/28/22 12:10 PM   Result Value Ref Range    POC Glucose 167 (H) 70 - 99 mg/dl    Performed on ACCU-CHEK    POCT Glucose    Collection Time: 04/28/22  9:40 PM   Result Value Ref Range    POC Glucose 195 (H) 70 - 99 mg/dl    Performed on ACCU-CHEK    CBC    Collection Time: 04/29/22  6:01 AM   Result Value Ref Range    WBC 7.0 4.8 - 10.8 K/uL    RBC 3.41 (L) 4.20 - 5.40 M/uL    Hemoglobin 11.0 (L) 12.0 - 16.0 g/dL    Hematocrit 32.7 (L) 37.0 - 47.0 %    MCV 95.8 82.0 - 100.0 fL    MCH 32.1 (H) 27.0 - 31.3 pg    MCHC 33.5 33.0 - 37.0 %    RDW 13.0 11.5 - 14.5 %    Platelets 049 063 - 114 K/uL   Basic Metabolic Panel w/ Reflex to MG    Collection Time: 04/29/22  6:02 AM   Result Value Ref Range    Sodium 138 135 - 144 mEq/L    Potassium reflex Magnesium 4.3 3.4 - 4.9 mEq/L    Chloride 103 95 - 107 mEq/L    CO2 20 20 - 31 mEq/L    Anion Gap 15 9 - 15 mEq/L    Glucose 134 (H) 70 - 99 mg/dL    BUN 28 (H) 8 - 23 mg/dL    CREATININE 1.17 (H) 0.50 - 0.90 mg/dL    GFR Non-African American 45.6 (L) >60    GFR  55.1 (L) >60    Calcium 9.1 8.5 - 9.9 mg/dL   POCT Glucose    Collection Time: 04/29/22  8:01 AM   Result Value Ref Range    POC Glucose 142 (H) 70 - 99 mg/dl    Performed on ACCU-CHEK      Assessment/Plan:    71 y.o. female with PMH of PMH of arthritis, DM2, GERD, lung CA and HTN presented with:    1. Lung nodule: in the setting of lung CA. S/p RUL wedge with lobectomy. Management per primary team  2. Hypertension: Stable. Continue chlorthalidone and aldactone . Monitor need for adjustments. 3. CKD3: Labs at baseline. Follow trend. 4. DM2: Continue jardiance and glipizide. Hold metformin while admitted. POCT ACHS. SSI, hypoglycemia protocol. 5. Anemia: Mild. Follow trend. 6. GERD: Home meds  7.  DVT prophylaxis    Electronically signed by TULIO Goodman NP on 4/29/22 at 11:28 AM EDT

## 2022-04-29 NOTE — PROGRESS NOTES
Shift assessment completed and documented. A&OX4 but drowsy and a little forgetful. Medications administered per MAR. Call light within reach. No further needs verbalized at this time.

## 2022-04-30 LAB
EKG ATRIAL RATE: 83 BPM
EKG P AXIS: 28 DEGREES
EKG P-R INTERVAL: 144 MS
EKG Q-T INTERVAL: 376 MS
EKG QRS DURATION: 100 MS
EKG QTC CALCULATION (BAZETT): 441 MS
EKG R AXIS: -27 DEGREES
EKG T AXIS: 60 DEGREES
EKG VENTRICULAR RATE: 83 BPM
GLUCOSE BLD-MCNC: 156 MG/DL (ref 70–99)
GLUCOSE BLD-MCNC: 172 MG/DL (ref 70–99)
GLUCOSE BLD-MCNC: 183 MG/DL (ref 70–99)
GLUCOSE BLD-MCNC: 196 MG/DL (ref 70–99)
PERFORMED ON: ABNORMAL

## 2022-04-30 PROCEDURE — 2580000003 HC RX 258: Performed by: THORACIC SURGERY (CARDIOTHORACIC VASCULAR SURGERY)

## 2022-04-30 PROCEDURE — 2060000000 HC ICU INTERMEDIATE R&B

## 2022-04-30 PROCEDURE — 6360000002 HC RX W HCPCS: Performed by: THORACIC SURGERY (CARDIOTHORACIC VASCULAR SURGERY)

## 2022-04-30 PROCEDURE — 6370000000 HC RX 637 (ALT 250 FOR IP): Performed by: THORACIC SURGERY (CARDIOTHORACIC VASCULAR SURGERY)

## 2022-04-30 PROCEDURE — 6370000000 HC RX 637 (ALT 250 FOR IP): Performed by: NURSE PRACTITIONER

## 2022-04-30 PROCEDURE — 2700000000 HC OXYGEN THERAPY PER DAY

## 2022-04-30 RX ADMIN — BUPROPION HYDROCHLORIDE 150 MG: 150 TABLET, EXTENDED RELEASE ORAL at 20:39

## 2022-04-30 RX ADMIN — OXYCODONE 5 MG: 5 TABLET ORAL at 10:50

## 2022-04-30 RX ADMIN — BUPROPION HYDROCHLORIDE 150 MG: 150 TABLET, EXTENDED RELEASE ORAL at 10:40

## 2022-04-30 RX ADMIN — ATORVASTATIN CALCIUM 20 MG: 20 TABLET, FILM COATED ORAL at 20:39

## 2022-04-30 RX ADMIN — ENOXAPARIN SODIUM 40 MG: 100 INJECTION SUBCUTANEOUS at 10:44

## 2022-04-30 RX ADMIN — CHLORTHALIDONE 25 MG: 25 TABLET ORAL at 10:40

## 2022-04-30 RX ADMIN — INSULIN LISPRO 2 UNITS: 100 INJECTION, SOLUTION INTRAVENOUS; SUBCUTANEOUS at 16:52

## 2022-04-30 RX ADMIN — Medication 400 MG: at 10:41

## 2022-04-30 RX ADMIN — OXYBUTYNIN CHLORIDE 10 MG: 5 TABLET, EXTENDED RELEASE ORAL at 10:40

## 2022-04-30 RX ADMIN — INSULIN LISPRO 1 UNITS: 100 INJECTION, SOLUTION INTRAVENOUS; SUBCUTANEOUS at 21:57

## 2022-04-30 RX ADMIN — SPIRONOLACTONE 25 MG: 25 TABLET ORAL at 10:41

## 2022-04-30 RX ADMIN — DOXAZOSIN 2 MG: 4 TABLET ORAL at 20:39

## 2022-04-30 RX ADMIN — FAMOTIDINE 20 MG: 20 TABLET ORAL at 10:41

## 2022-04-30 RX ADMIN — ESCITALOPRAM OXALATE 5 MG: 10 TABLET ORAL at 10:41

## 2022-04-30 RX ADMIN — Medication 10 ML: at 10:42

## 2022-04-30 RX ADMIN — Medication 10 ML: at 20:39

## 2022-04-30 RX ADMIN — POTASSIUM CHLORIDE 10 MEQ: 750 TABLET, FILM COATED, EXTENDED RELEASE ORAL at 20:39

## 2022-04-30 RX ADMIN — POTASSIUM CHLORIDE 10 MEQ: 750 TABLET, FILM COATED, EXTENDED RELEASE ORAL at 10:50

## 2022-04-30 ASSESSMENT — PAIN SCALES - GENERAL: PAINLEVEL_OUTOF10: 5

## 2022-04-30 NOTE — PROGRESS NOTES
Shift assessment completed and documented. A&OX4. Pt c/o pain 7/10, given PRN and scheduled medications per MAR. Chest tube to water seal, dressing with old drainage-- see flowsheets for further description. Call light within reach. No further needs verbalized at this time.

## 2022-04-30 NOTE — CARE COORDINATION
Met with patient at bedside. D/C plan remains home. Denies any needs for [place] [id number] upon d/c. Per Attending, possible removal of chest tube and d/c tomorrow, 5/1/22. CM/SW to continue to follow for d/c planning needs.

## 2022-04-30 NOTE — PROGRESS NOTES
Pt off 02. Doing well. Not walking in halls yet. Minimal air leak after 3 coughs. Good chance to remove chest tube and discharge tomorrow.

## 2022-04-30 NOTE — PROGRESS NOTES
Hospitalist Progress Note      PCP: Zoila Phelan    Date of Admission: 4/28/2022    Subjective: :  Patient reports pain in the chest tube site  Denies shortness of breath    Medications:  Reviewed    Infusion Medications    dextrose      sodium chloride      dextrose       Scheduled Medications    insulin lispro  0-12 Units SubCUTAneous TID WC    insulin lispro  0-6 Units SubCUTAneous Nightly    glipiZIDE  2.5 mg Oral QAM AC    spironolactone  25 mg Oral Daily    potassium chloride  10 mEq Oral BID    oxybutynin  10 mg Oral Daily    [Held by provider] metFORMIN  1,000 mg Oral BID WC    magnesium oxide  400 mg Oral Daily    escitalopram  5 mg Oral Daily    empagliflozin  25 mg Oral Daily    doxazosin  2 mg Oral Nightly    chlorthalidone  25 mg Oral Daily    buPROPion  150 mg Oral BID    atorvastatin  20 mg Oral Nightly    sodium chloride flush  5-40 mL IntraVENous 2 times per day    enoxaparin  40 mg SubCUTAneous Daily    famotidine  20 mg Oral Daily    Or    famotidine (PEPCID) injection  20 mg IntraVENous Daily     PRN Meds: oxyCODONE, glucose, dextrose, glucagon (rDNA), dextrose, sodium chloride flush, sodium chloride, ondansetron **OR** ondansetron, naloxone, magnesium sulfate, acetaminophen, polyethylene glycol, glucose, dextrose, glucagon (rDNA), dextrose, ketorolac      Intake/Output Summary (Last 24 hours) at 4/30/2022 1030  Last data filed at 4/30/2022 0539  Gross per 24 hour   Intake --   Output 320 ml   Net -320 ml       Exam:    BP (!) 104/42   Pulse 89   Temp 98.6 °F (37 °C) (Oral)   Resp 17   Ht 5' 2\" (1.575 m)   Wt 159 lb (72.1 kg)   SpO2 90%   BMI 29.08 kg/m²     General appearance: No apparent distress  HEENT:  Conjunctivae/corneas clear. Neck: Supple, with full range of motion. Respiratory:  Normal respiratory effort.   Right chest tube in place, bilateral good air entry  Cardiovascular: Regular rate and rhythm with normal S1/S2 without murmurs, rubs or gallops. Abdomen: Soft, non-tender, non-distended with normal bowel sounds. Musculoskeletal: No clubbing, cyanosis or edema bilaterally. Skin: Skin color, texture, turgor normal.  No rashes or lesions. Neuro: Non Focal.  Alert, moving all extremities      Labs:   Recent Labs     04/29/22  0601   WBC 7.0   HGB 11.0*   HCT 32.7*        Recent Labs     04/29/22  0602      K 4.3      CO2 20   BUN 28*   CREATININE 1.17*   CALCIUM 9.1     No results for input(s): AST, ALT, BILIDIR, BILITOT, ALKPHOS in the last 72 hours. No results for input(s): INR in the last 72 hours. No results for input(s): Stephen Oiler in the last 72 hours. Urinalysis:    No results found for: Easter Fermo, BACTERIA, RBCUA, BLOODU, [address], [address]    Radiology:  XR CHEST PORTABLE   Final Result   RIGHT THORACOSTOMY TUBE WITH MILD RIGHT APICAL PNEUMOTHORAX AS DISCUSSED. Assessment/Plan:    Active Hospital Problems    Diagnosis Date Noted    Status post lobectomy of lung [Z90.2] 04/28/2022     Priority: Medium     71-year-old female with history of diabetes mellitus,  hypertension, GERD who is admitted to thoracic surgery and underwent right upper lobe wedge resection with lobectomy. Lung nodule s/p RUL wedge resection with lobectomy -management per thoracic surgery    Diabetes mellitus-continue Jardiance and glipizide. Holding metformin while inpatient. Continue sliding scale insulin    Hypertension-continue chlorthalidone and Aldactone. CKD 3-creatinine at baseline. Anemia monitor CBC. Dispo-possible discharge tomorrow after chest tube is taken out    Additional work up or/and treatment plan may be added today or then after based on clinical progression. I am managing a portion of pt care. Some medical issues are handled by other specialists. Additional work up and treatment should be done in out pt setting by pt PCP and other out pt providers.      In addition to examining and evaluating pt, I spent additional time explaining care, normal and abnormal findings, and treatment plan. All of pt questions were answered. Counseling, diet and education were  provided. Case will be discussed with nursing staff when appropriate. Family will be updated if and when appropriate. Diet: ADULT DIET;  Regular    Code Status: Full Code        Electronically signed by Rick Yu MD on 4/30/2022 at 10:30 AM

## 2022-05-01 ENCOUNTER — APPOINTMENT (OUTPATIENT)
Dept: GENERAL RADIOLOGY | Age: 72
DRG: 164 | End: 2022-05-01
Attending: THORACIC SURGERY (CARDIOTHORACIC VASCULAR SURGERY)
Payer: COMMERCIAL

## 2022-05-01 VITALS
HEART RATE: 92 BPM | TEMPERATURE: 97.5 F | OXYGEN SATURATION: 100 % | BODY MASS INDEX: 29.26 KG/M2 | WEIGHT: 159 LBS | HEIGHT: 62 IN | RESPIRATION RATE: 16 BRPM | SYSTOLIC BLOOD PRESSURE: 124 MMHG | DIASTOLIC BLOOD PRESSURE: 63 MMHG

## 2022-05-01 LAB
GLUCOSE BLD-MCNC: 143 MG/DL (ref 70–99)
GLUCOSE BLD-MCNC: 155 MG/DL (ref 70–99)
PERFORMED ON: ABNORMAL
PERFORMED ON: ABNORMAL

## 2022-05-01 PROCEDURE — 71045 X-RAY EXAM CHEST 1 VIEW: CPT

## 2022-05-01 PROCEDURE — 6370000000 HC RX 637 (ALT 250 FOR IP): Performed by: INTERNAL MEDICINE

## 2022-05-01 PROCEDURE — 6370000000 HC RX 637 (ALT 250 FOR IP): Performed by: THORACIC SURGERY (CARDIOTHORACIC VASCULAR SURGERY)

## 2022-05-01 PROCEDURE — 6370000000 HC RX 637 (ALT 250 FOR IP): Performed by: NURSE PRACTITIONER

## 2022-05-01 PROCEDURE — 2580000003 HC RX 258: Performed by: THORACIC SURGERY (CARDIOTHORACIC VASCULAR SURGERY)

## 2022-05-01 PROCEDURE — 6360000002 HC RX W HCPCS: Performed by: THORACIC SURGERY (CARDIOTHORACIC VASCULAR SURGERY)

## 2022-05-01 RX ORDER — GUAIFENESIN 600 MG/1
600 TABLET, EXTENDED RELEASE ORAL 2 TIMES DAILY
Status: DISCONTINUED | OUTPATIENT
Start: 2022-05-01 | End: 2022-05-01 | Stop reason: HOSPADM

## 2022-05-01 RX ORDER — GUAIFENESIN 100 MG/5ML
10 SYRUP ORAL EVERY 4 HOURS PRN
Qty: 200 ML | Refills: 0 | Status: SHIPPED | OUTPATIENT
Start: 2022-05-01

## 2022-05-01 RX ORDER — OXYCODONE HYDROCHLORIDE 5 MG/1
5 TABLET ORAL EVERY 6 HOURS PRN
Qty: 25 TABLET | Refills: 0 | Status: SHIPPED | OUTPATIENT
Start: 2022-05-01 | End: 2022-05-08

## 2022-05-01 RX ADMIN — INSULIN LISPRO 2 UNITS: 100 INJECTION, SOLUTION INTRAVENOUS; SUBCUTANEOUS at 08:11

## 2022-05-01 RX ADMIN — GLIPIZIDE 2.5 MG: 5 TABLET ORAL at 06:09

## 2022-05-01 RX ADMIN — CHLORTHALIDONE 25 MG: 25 TABLET ORAL at 08:07

## 2022-05-01 RX ADMIN — GUAIFENESIN 600 MG: 600 TABLET ORAL at 06:09

## 2022-05-01 RX ADMIN — FAMOTIDINE 20 MG: 20 TABLET ORAL at 08:06

## 2022-05-01 RX ADMIN — OXYBUTYNIN CHLORIDE 10 MG: 5 TABLET, EXTENDED RELEASE ORAL at 08:06

## 2022-05-01 RX ADMIN — ENOXAPARIN SODIUM 40 MG: 100 INJECTION SUBCUTANEOUS at 09:24

## 2022-05-01 RX ADMIN — SPIRONOLACTONE 25 MG: 25 TABLET ORAL at 08:06

## 2022-05-01 RX ADMIN — BUPROPION HYDROCHLORIDE 150 MG: 150 TABLET, EXTENDED RELEASE ORAL at 08:06

## 2022-05-01 RX ADMIN — INSULIN LISPRO 2 UNITS: 100 INJECTION, SOLUTION INTRAVENOUS; SUBCUTANEOUS at 11:45

## 2022-05-01 RX ADMIN — Medication 10 ML: at 10:59

## 2022-05-01 RX ADMIN — Medication 400 MG: at 08:05

## 2022-05-01 RX ADMIN — ESCITALOPRAM OXALATE 5 MG: 10 TABLET ORAL at 08:06

## 2022-05-01 RX ADMIN — POTASSIUM CHLORIDE 10 MEQ: 750 TABLET, FILM COATED, EXTENDED RELEASE ORAL at 08:06

## 2022-05-01 ASSESSMENT — PAIN SCALES - GENERAL
PAINLEVEL_OUTOF10: 0
PAINLEVEL_OUTOF10: 0

## 2022-05-01 NOTE — PROGRESS NOTES
Hospitalist Progress Note      PCP: Manuel Gardiner    Date of Admission: 4/28/2022    Subjective: :  Chest tube has been removed today  Patient reports pain is better  She reports cough  Denies shortness of breath    Medications:  Reviewed    Infusion Medications    dextrose      sodium chloride       Scheduled Medications    guaiFENesin  600 mg Oral BID    insulin lispro  0-12 Units SubCUTAneous TID WC    insulin lispro  0-6 Units SubCUTAneous Nightly    glipiZIDE  2.5 mg Oral QAM AC    spironolactone  25 mg Oral Daily    potassium chloride  10 mEq Oral BID    oxybutynin  10 mg Oral Daily    [Held by provider] metFORMIN  1,000 mg Oral BID WC    magnesium oxide  400 mg Oral Daily    escitalopram  5 mg Oral Daily    empagliflozin  25 mg Oral Daily    doxazosin  2 mg Oral Nightly    chlorthalidone  25 mg Oral Daily    buPROPion  150 mg Oral BID    atorvastatin  20 mg Oral Nightly    sodium chloride flush  5-40 mL IntraVENous 2 times per day    enoxaparin  40 mg SubCUTAneous Daily    famotidine  20 mg Oral Daily    Or    famotidine (PEPCID) injection  20 mg IntraVENous Daily     PRN Meds: dextrose bolus (hypoglycemia) **OR** dextrose bolus (hypoglycemia), glucose, oxyCODONE, glucagon (rDNA), dextrose, sodium chloride flush, sodium chloride, ondansetron **OR** ondansetron, naloxone, magnesium sulfate, acetaminophen, polyethylene glycol, ketorolac      Intake/Output Summary (Last 24 hours) at 5/1/2022 1145  Last data filed at 5/1/2022 0655  Gross per 24 hour   Intake 240 ml   Output 140 ml   Net 100 ml       Exam:    /63   Pulse 92   Temp 97.5 °F (36.4 °C) (Oral)   Resp 16   Ht 5' 2\" (1.575 m)   Wt 159 lb (72.1 kg)   SpO2 100%   BMI 29.08 kg/m²     General appearance: No apparent distress  HEENT:  Conjunctivae/corneas clear. Neck: Supple, with full range of motion. Respiratory:  Normal respiratory effort.   Clear to auscultation  Cardiovascular: Regular rate and rhythm with normal S1/S2 without murmurs, rubs or gallops. Abdomen: Soft, non-tender, non-distended with normal bowel sounds. Musculoskeletal: No clubbing, cyanosis or edema bilaterally. Skin: Skin color, texture, turgor normal.  No rashes or lesions. Neuro: Non Focal.  Alert, moving all extremities      Labs:   Recent Labs     04/29/22  0601   WBC 7.0   HGB 11.0*   HCT 32.7*        Recent Labs     04/29/22  0602      K 4.3      CO2 20   BUN 28*   CREATININE 1.17*   CALCIUM 9.1     No results for input(s): AST, ALT, BILIDIR, BILITOT, ALKPHOS in the last 72 hours. No results for input(s): INR in the last 72 hours. No results for input(s): Verlena Kushal in the last 72 hours. Urinalysis:    No results found for: David Bourdon, BACTERIA, RBCUA, BLOODU, SPECGRAV, [address]    Radiology:  XR CHEST PORTABLE   Final Result      No radiographic evidence of acute intrathoracic process. XR CHEST PORTABLE   Final Result   RIGHT THORACOSTOMY TUBE WITH MILD RIGHT APICAL PNEUMOTHORAX AS DISCUSSED. Assessment/Plan:    Active Hospital Problems    Diagnosis Date Noted    Status post lobectomy of lung [Z90.2] 04/28/2022     Priority: Medium     78-year-old female with history of diabetes mellitus,  hypertension, GERD who is admitted to thoracic surgery and underwent right upper lobe wedge resection with lobectomy. Lung nodule s/p RUL wedge resection with lobectomy -management per thoracic surgery    Diabetes mellitus-patient can resume home antidiabetic regiment on discharge. Hypertension-continue chlorthalidone and Aldactone. CKD 3-creatinine at baseline. Anemia monitor CBC. Dispo-patient is going home today. Additional work up or/and treatment plan may be added today or then after based on clinical progression. I am managing a portion of pt care. Some medical issues are handled by other specialists.  Additional work up and treatment should be done in out pt setting by pt PCP and other out pt providers. In addition to examining and evaluating pt, I spent additional time explaining care, normal and abnormal findings, and treatment plan. All of pt questions were answered. Counseling, diet and education were  provided. Case will be discussed with nursing staff when appropriate. Family will be updated if and when appropriate. Diet: ADULT DIET;  Regular    Code Status: Full Code        Electronically signed by Norval Blizzard, MD on 5/1/2022 at 11:45 AM

## 2022-05-01 NOTE — CARE COORDINATION
Met with patient. D/C home. Declines needs. All questions answered. Second IMM reviewed with patient. Verbalizes understanding. Copy placed in chart.

## 2022-05-01 NOTE — PROGRESS NOTES
Shift assessment completed. Patient alert and x 4, no c/o pain or discomfort. Right lateral chest tube to water suction, dark red drainage noted. Dressing clean, dry and intact. Makes needs known to staff.

## 2022-05-01 NOTE — PROGRESS NOTES
Reviewed d/c instructions with patient. Verbalized understanding. Waiting for [facility] to transport home.   Electronically signed by Logan Velazquez RN on 5/1/2022 at 12:50 PM

## 2022-05-01 NOTE — PROGRESS NOTES
Assessment completed this shift. Alert and oriented x4. Denies pain or nausea. Up independently. Ambulated in khalil this am. Chest removed by Dr Peewee Peraza. Plan to discharge after lunch.   Electronically signed by Camillo Schaumann, RN on 5/1/2022 at 12:08 PM

## 2022-05-01 NOTE — DISCHARGE SUMMARY
Physician Discharge Summary     Patient ID:  Rashida Pelletier  [id number]  71 y.o.  1950    Admit date: 4/28/2022    Discharge date and time: No discharge date for patient encounter. 5/1/2022    Admitting Physician: Sofia Raya MD     Discharge Physician: same    Admission Diagnoses: Status post lobectomy of lung [Z90.2]    Discharge Diagnoses: same    Admission Condition: good    Discharged Condition: good    Indication for Admission: surgery    Hospital Course: Pt admitted day of surgery for resection of a suspicious lung mass. Pathology suggested malignancy so a lobectomy was done. Surgery went well and by POD3 her chest tube was removed and she was discharged to home.     Consults: none    Significant Diagnostic Studies: radiology: CXR: no pneumothorax    Treatments: surgery: RVATS upper lobectomy    Discharge Exam:  /63   Pulse 92   Temp 97.5 °F (36.4 °C) (Oral)   Resp 16   Ht 5' 2\" (1.575 m)   Wt 159 lb (72.1 kg)   SpO2 100%   BMI 29.08 kg/m²     General Appearance:    Alert, cooperative, no distress, appears stated age   Head:    Normocephalic, without obvious abnormality, atraumatic   Eyes:    PERRL, conjunctiva/corneas clear, EOM's intact, fundi     benign, both eyes   Ears:    Normal TM's and external ear canals, both ears   Nose:   Nares normal, septum midline, mucosa normal, no drainage    or sinus tenderness   Throat:   Lips, mucosa, and tongue normal; teeth and gums normal   Neck:   Supple, symmetrical, trachea midline, no adenopathy;     thyroid:  no enlargement/tenderness/nodules; no carotid    bruit or JVD   Back:     Symmetric, no curvature, ROM normal, no CVA tenderness   Lungs:     Clear to auscultation bilaterally, respirations unlabored   Chest Wall:    No tenderness or deformity    Heart:    Regular rate and rhythm, S1 and S2 normal, no murmur, rub   or gallop   Breast Exam:    No tenderness, masses, or nipple abnormality   Abdomen:     Soft, non-tender, bowel sounds active all four quadrants,     no masses, no organomegaly   Genitalia:    Normal female without lesion, discharge or tenderness   Rectal:    Normal tone ;guaiac negative stool   Extremities:   Extremities normal, atraumatic, no cyanosis or edema   Pulses:   2+ and symmetric all extremities   Skin:   Skin color, texture, turgor normal, no rashes or lesions   Lymph nodes:   Cervical, supraclavicular, and axillary nodes normal   Neurologic:   CNII-XII intact, normal strength, sensation and reflexes     throughout       Disposition: home    In process/preliminary results:  Outstanding Order Results     Date and Time Order Name Status Description    5/1/2022 10:59 AM XR CHEST PORTABLE In process     4/28/2022 10:50 AM Surgical Pathology In process           Patient Instructions:   Current Discharge Medication List      START taking these medications    Details   oxyCODONE (ROXICODONE) 5 MG immediate release tablet Take 1 tablet by mouth every 6 hours as needed for Pain for up to 7 days.   Qty: 25 tablet, Refills: 0    Comments: Reduce doses taken as pain becomes manageable  Associated Diagnoses: Status post lobectomy of lung         CONTINUE these medications which have NOT CHANGED    Details   glipiZIDE (GLUCOTROL XL) 2.5 MG extended release tablet Take 2.5 mg by mouth daily      buPROPion (WELLBUTRIN SR) 150 MG extended release tablet Take 150 mg by mouth 2 times daily      escitalopram (LEXAPRO) 5 MG tablet Take 5 mg by mouth daily      vitamin B-12 (CYANOCOBALAMIN) 1000 MCG tablet Take 1,000 mcg by mouth daily      empagliflozin (JARDIANCE) 25 MG tablet Take 25 mg by mouth daily      spironolactone (ALDACTONE) 25 MG tablet Take 25 mg by mouth daily      chlorthalidone (HYGROTON) 25 MG tablet Take 25 mg by mouth daily      Exenatide ER (BYDUREON BCISE) 2 MG/0.85ML DOMINIQUE Inject into the skin      terconazole (TERAZOL 3) 0.8 % vaginal cream Place vaginally nightly for 3 days  Qty: 1 Tube, Refills: 3      magnesium (MAGNESIUM-OXIDE) 250 MG TABS tablet Take 250 mg by mouth daily      atorvastatin (LIPITOR) 20 MG tablet Take 20 mg by mouth daily      omeprazole (PRILOSEC) 20 MG capsule Take 20 mg by mouth daily. potassium chloride (MICRO-K) 10 MEQ CR capsule Take 10 mEq by mouth 2 times daily. oxybutynin (DITROPAN-XL) 10 MG CR tablet Take 10 mg by mouth daily. doxazosin (CARDURA) 2 MG tablet Take 2 mg by mouth nightly. metFORMIN (GLUCOPHAGE) 500 MG tablet Take 1,000 mg by mouth 2 times daily (with meals)       aspirin 81 MG tablet Take 81 mg by mouth daily. calcium carbonate 600 MG TABS tablet Take 1 tablet by mouth daily. Cholecalciferol (VITAMIN D) 5000 UNITS CAPS capsule Take 5,000 Units by mouth daily. STOP taking these medications       glipiZIDE (GLUCOTROL) 5 MG tablet Comments:   Reason for Stopping:             Activity: no heavy lifting for 3 weeks  Diet: regular diet  Wound Care: as directed    Follow-up with Dr Iftikhar Lmaa in 3 weeks.     Signed:  Dr. Iftikhar Lama  5/1/2022  11:07 AM

## 2022-05-02 DIAGNOSIS — Z90.2 STATUS POST LOBECTOMY OF LUNG: Primary | ICD-10-CM

## 2022-05-05 DIAGNOSIS — C7A.090 ATYPICAL CARCINOID LUNG TUMOR (HCC): Primary | ICD-10-CM

## 2022-05-11 ENCOUNTER — TELEPHONE (OUTPATIENT)
Dept: CARDIOTHORACIC SURGERY | Age: 72
End: 2022-05-11

## 2022-05-11 RX ORDER — METHYLPREDNISOLONE 4 MG/1
TABLET ORAL
Qty: 1 KIT | Refills: 0 | Status: SHIPPED | OUTPATIENT
Start: 2022-05-11 | End: 2022-05-16

## 2022-05-11 NOTE — TELEPHONE ENCOUNTER
Pt called and stated she is going to see Dr. Deshawn Luna but would like to see someone closer to [facility], [address] [place] [address] preferred. Med Onc called and asked if she needed to be seen by them as well as Dr. Deshawn Luna. If so, I can put a referral in but I believe Herrera Powers will want to see a [address] [place] [address] doctor for that as well closer to home.

## 2022-05-16 ENCOUNTER — HOSPITAL ENCOUNTER (OUTPATIENT)
Dept: RADIATION ONCOLOGY | Age: 72
Discharge: HOME | End: 2022-05-16
Payer: COMMERCIAL

## 2022-05-16 VITALS
TEMPERATURE: 97.7 F | SYSTOLIC BLOOD PRESSURE: 111 MMHG | WEIGHT: 150.6 LBS | HEART RATE: 98 BPM | OXYGEN SATURATION: 96 % | HEIGHT: 62 IN | RESPIRATION RATE: 18 BRPM | DIASTOLIC BLOOD PRESSURE: 62 MMHG | BODY MASS INDEX: 27.71 KG/M2

## 2022-05-16 DIAGNOSIS — C34.11 MALIGNANT NEOPLASM OF UPPER LOBE OF RIGHT LUNG (HCC): ICD-10-CM

## 2022-05-16 PROCEDURE — 99212 OFFICE O/P EST SF 10 MIN: CPT | Performed by: RADIOLOGY

## 2022-05-16 PROCEDURE — 99205 OFFICE O/P NEW HI 60 MIN: CPT | Performed by: RADIOLOGY

## 2022-05-16 RX ORDER — LORATADINE 10 MG/1
10 CAPSULE, LIQUID FILLED ORAL AS NEEDED
COMMUNITY

## 2022-05-16 NOTE — PROGRESS NOTES
SW visit with Pt (accompanied by her sister, Aruna Ortiz) prior to her Radiation Oncology consult with Dr. Carlee Epps. Pt reports a distress level of 7/10; primarily due to worry re: treatment plan/dx. Pt is retired from her position as a  from [employer], after 31 years employment. She states has not yet begun to enjoy her [facility] as her good friend (Pt was primary caregiver)  approx. 1 year ago, followed by her spouse last 2021 (after 5 years of illness). SW provided emotional support while encouraging Pt to reach out to hospice bereavement for ongoing support. Pt is in agreement to this plan. Pt states is deciding whether to have Radiation treatment here at this cancer center or closer to home in [facility]. She will further explore options with Dr. Carlee Epps. No further SW needs at this time.

## 2022-05-16 NOTE — CONSULTS
NURSING ASSESSMENT     Date: 2022        Patient Name: Meeta Andrew     YOB: 1950      Age:  71 y.o. MRN: [id number]       Chaperone [] Yes   [] No sister, Alexsander Leigh     Advance Directives:   Do you currently have completed advance directives (living will)? [x] Yes   [] No         *If yes, please bring us a copy for your records. *If no, would you like info or assistance in completing advance directives (living will)? [] Yes   [x] No    Pain Score:   Pain Score (1-10): 5    Pain Location:  hands  Pain Duration:  Every day  Pain Management/Control: doesn't take anything for pain      Is pain affecting your ability to take care of yourself or move throughout your home? [] Yes   [x] No    General: Anorexia, Weight Loss and Fatigue  Patient has gained weight [] Yes   [x] No  Patient has lost weight [x] Yes   [] No  How much weight in pounds and over what length of time: lost 75lb since 2021    Eyes (Ophthalmic):  No Problem     Skin (Dermatological): incisions right side healing well     ENT: allergies and sinus drainage lately, takes claritin prn, hoarse voice     Respiratory: Cough and ALEXANDER, phlegm is white     Cardiovascular: No Problems      Device   [] Yes   [x] No   Copy of Card Obtained [] Yes   [x] No    Gastrointestinal: vomits without warning on occasion    Genito-Urinary: No Problems     Breast: No Problems     Musculoskeletal: Joint Pain hands, history of bilat TKR and shoulder replacement on the right    Neurological: Dizziness      Hematological and Lymphatic: No Problems     Endocrine: Diabetes Mellitus controlled as reported by patient    Gyn History:   /Para: 1/1  Age of Menarche: 7  Age of Menopause 51  Vaginal Bleeding:  none  First Pregnancy: 25  Breast Feeding:  no  Last Menstrual period: 49  Oral Contraceptives: no     Number of years:   Hormone Replacement therapy yes     Number of Years: couple months  Last Pap Smear: not lately  Last Mammogram December 2021    A 10-point review of systems  has been conducted and pertinent positives have been   recorded. All other review of systems are negative    Was the patient admitted during the course of treatment OR within 30 days of treatment?  N/A

## 2022-05-20 ENCOUNTER — HOSPITAL ENCOUNTER (OUTPATIENT)
Dept: GENERAL RADIOLOGY | Age: 72
Discharge: HOME | End: 2022-05-22
Payer: COMMERCIAL

## 2022-05-20 ENCOUNTER — OFFICE VISIT (OUTPATIENT)
Dept: CARDIOTHORACIC SURGERY | Age: 72
End: 2022-05-20

## 2022-05-20 VITALS
TEMPERATURE: 96.3 F | HEIGHT: 62 IN | WEIGHT: 150 LBS | OXYGEN SATURATION: 97 % | HEART RATE: 99 BPM | BODY MASS INDEX: 27.6 KG/M2

## 2022-05-20 DIAGNOSIS — C7A.090 ATYPICAL CARCINOID LUNG TUMOR (HCC): Primary | ICD-10-CM

## 2022-05-20 DIAGNOSIS — Z90.2 STATUS POST LOBECTOMY OF LUNG: Primary | ICD-10-CM

## 2022-05-20 DIAGNOSIS — Z90.2 STATUS POST LOBECTOMY OF LUNG: ICD-10-CM

## 2022-05-20 PROBLEM — C34.11 MALIGNANT NEOPLASM OF UPPER LOBE OF RIGHT LUNG (HCC): Status: ACTIVE | Noted: 2022-05-20

## 2022-05-20 PROCEDURE — 71045 X-RAY EXAM CHEST 1 VIEW: CPT

## 2022-05-20 PROCEDURE — 99024 POSTOP FOLLOW-UP VISIT: CPT | Performed by: THORACIC SURGERY (CARDIOTHORACIC VASCULAR SURGERY)

## 2022-05-20 NOTE — PATIENT INSTRUCTIONS
Patient has no surgical restrictions and I have encouraged her to follow-up with medical and radiation oncology.

## 2022-05-20 NOTE — PROGRESS NOTES
Patient is about 3 weeks out from a right upper lobectomy. Pathology showed a [id number] atypical carcinoid. She is recovering slowly. She still gets short of breath with exertion but is a little bit stronger than at discharge. She is admitting that she is not doing much walking. She denies any productive cough. Her pain is not an issue. Chest x-ray shows well-expanded lungs. Lungs are clear bilaterally. Incisions are well-healed. Patient has no restrictions from surgery and can do whatever physical activity she feels ready for. I have strongly encouraged her to start walking multiple times a day to start building up her strength. Because of the mediastinal node spread I will refer her to medical oncology. She wishes to see [facility] medical oncology. She has already followed up with radiation oncology. I will see her on an as-needed basis.

## 2022-05-20 NOTE — CONSULTS
[facility]           Radiation Oncology      [address] [facility]        [address]        Philip Vern: [phone]        F: [phone]       [email or web address]                   Dr. Estrellita Bradford MD PhD    CONSULT NOTE     Date of Service: 2022  Patient ID: Naomi Andrew   : 1950  MRN: [id number]   Acct Number: [de-identified]       Naomi Marley May  72 y.o.   1950    REFERRING PROVIDER: Comfort Mary    PCP:  Marbin Hanson    DIAGNOSIS: Atypical carcinoid of the right lung    STAGING: Cancer Staging  Malignant neoplasm of upper lobe of right lung ([facility] [address]  Staging form: Lung, AJCC 8th Edition  - Pathologic: Stage IIIA (pT1a, pN2, cM0) - Signed by Estrellita Bradford MD on 2022      HISTORY OF PRESENT ILLNESS: Ms. Naomi Andrew  is a 71y.o. year old female with history of diabetes mellitus, hypertension, CKD stage III, depression, and more recent diagnosis of right lung atypical carcinoid, grade 2, stage pT1 aN2 M0 status post wedge resection and mediastinal lymph node sampling. Her oncologic history dates back to an abnormal screening CT chest she had last year which revealed an abnormality in the right upper lobe. This lesion was followed and she had a repeat CT chest on 2/15/2022 which demonstrated the previous groundglass nodule in the right upper lobe now predominantly solid and slightly larger. Patient had a restaging PET/CT on 3/17/2022 which revealed mild hypermetabolic activity in the right upper lobe pulmonary nodule. Patient discussed the imaging finding with Dr. Comfort Mary with thoracic surgery and consented to a right upper lobe lobectomy with biopsy. Pathology revealed a focus of atypical carcinoid/neuroendocrine tumor, grade 2 without evidence of visceral pleural invasion but there was evidence of lymphovascular invasion. All margins were negative for tumor. There were 5 lymph nodes sampled 2 of which contain tumor in stations 4R and 10 R. She was therefore staged as [id number].   Of  guaiFENesin (ALTARUSSIN) 100 MG/5ML syrup Take 10 mLs by mouth every 4 hours as needed for Cough 200 mL 0    glipiZIDE (GLUCOTROL XL) 2.5 MG extended release tablet Take 2.5 mg by mouth daily      buPROPion (WELLBUTRIN SR) 150 MG extended release tablet Take 150 mg by mouth 2 times daily      escitalopram (LEXAPRO) 5 MG tablet Take 5 mg by mouth daily      vitamin B-12 (CYANOCOBALAMIN) 1000 MCG tablet Take 1,000 mcg by mouth daily      empagliflozin (JARDIANCE) 25 MG tablet Take 25 mg by mouth daily      spironolactone (ALDACTONE) 25 MG tablet Take 25 mg by mouth daily      chlorthalidone (HYGROTON) 25 MG tablet Take 25 mg by mouth daily      Exenatide ER (BYDUREON BCISE) 2 MG/0.85ML AUIJ Inject into the skin      magnesium (MAGNESIUM-OXIDE) 250 MG TABS tablet Take 250 mg by mouth daily      omeprazole (PRILOSEC) 20 MG capsule Take 20 mg by mouth daily.  potassium chloride (MICRO-K) 10 MEQ CR capsule Take 10 mEq by mouth 2 times daily.  oxybutynin (DITROPAN-XL) 10 MG CR tablet Take 10 mg by mouth daily.  doxazosin (CARDURA) 2 MG tablet Take 2 mg by mouth nightly       metFORMIN (GLUCOPHAGE) 500 MG tablet Take 1,000 mg by mouth 2 times daily (with meals)       aspirin 81 MG tablet Take 81 mg by mouth daily.  calcium carbonate 600 MG TABS tablet Take 1 tablet by mouth daily       Cholecalciferol (VITAMIN D) 5000 UNITS CAPS capsule Take 5,000 Units by mouth daily. No current facility-administered medications for this encounter. FAMILY HISTORY:  Family History   Problem Relation Age of Onset    Diabetes Maternal Grandfather     Cancer Father         lung    Cancer Mother         ovarian       SOCIAL HISTORY:       reports that she has never smoked. She has never used smokeless tobacco..   reports no history of alcohol use. .   reports no history of drug use. REVIEW OF SYSTEMS:  Obtained from the patient, chart review and nursing assessment.     Review of Systems All other systems reviewed and are negative. PHYSICAL EXAMINATION:      Vitals:    05/16/22 1330   BP: 111/62   Pulse: 98   Resp: 18   Temp: 97.7 °F (36.5 °C)   SpO2: 96%   Weight: 150 lb 9.6 oz (68.3 kg)   Height: 5' 2\" (1.575 m)       Wt Readings from Last 3 Encounters:   05/20/22 150 lb (68 kg)   05/16/22 150 lb 9.6 oz (68.3 kg)   04/28/22 159 lb (72.1 kg)       ECOG PERFORMANCE STATUS:  1    Physical Exam  Vitals and nursing note reviewed. Constitutional:       Appearance: Normal appearance. Comments: Accompanied by her sister. HENT:      Head: Normocephalic and atraumatic. Eyes:      General: No scleral icterus. Extraocular Movements: Extraocular movements intact. Pupils: Pupils are equal, round, and reactive to light. Cardiovascular:      Heart sounds: Normal heart sounds. Pulmonary:      Effort: Pulmonary effort is normal.      Breath sounds: Normal breath sounds. Comments: Incisions on the right chest wall are well-healed. There is mild tenderness to palpation on the right chest wall. Abdominal:      General: Abdomen is flat. Palpations: Abdomen is soft. Musculoskeletal:         General: Normal range of motion. Cervical back: Normal range of motion and neck supple. No rigidity. Right lower leg: No edema. Left lower leg: No edema. Lymphadenopathy:      Cervical: No cervical adenopathy. Skin:     General: Skin is warm and dry. Neurological:      General: No focal deficit present. Mental Status: She is alert and oriented to person, place, and time. Mental status is at baseline.    Psychiatric:         Mood and Affect: Mood normal.         Behavior: Behavior normal.         RADIATION SAFETY AND ONCOLOGIC TREATMENT SUPPORT:    - Previous radiation history: None  - History of autoimmune or connective tissue disease: None  - Nutritional support/ PEG: Not applicable  - Dental evaluation: Not applicable  -  requested:   met with the patient, please refer to the note from that encounter.  - Transportation for daily treatment: No issues    TUMOR MARKERS: No results found for: PSA, CEA, , [id number],     IMAGING REVIEWED: Per HPI    PATHOLOGY REVIEWED: Per HPI    IMPRESSION: This is a 80-year-old female with history of diabetes mellitus, hypertension, depression, and CKD stage III with a more recent diagnosis of atypical carcinoid/neuroendocrine cancer of the right lung status post right lobectomy and mediastinal lymph node sampling with pathology revealing grade 2 neuroendocrine cancer with 2 out of 12 lymph nodes involved (positive in stations 4R and 10 R) with evidence of lymphovascular invasion but negative margins and a Ki-67 of 15%. She presents to discuss the role and rationale of adjuvant radiation therapy in the management of her disease. DISCUSSION/PLAN:   I reviewed the risks, benefits, and rationale of radiation therapy in this setting. At the outset, extrapolating from the non-small cell lung cancer literature, the presence of N2 disease would portend for a higher risk of local regional recurrence and thus would merit adjuvant radiation therapy plus minus chemotherapy. However I did discuss with the patient that atypical carcinoid/neuroendocrine cancer is much less common and there is therefore not good randomized data to support the use of adjuvant therapy in this setting. I did discuss the relatively good risk factors of her cancer which include negative margins, intermediate grade, lack of perineural invasion, and low Ki-67 staining of 15%. Some high risk features include positive lymphovascular invasion, N2 disease, and atypical histology. Retrospective literature does demonstrate some benefit in the setting of higher Ki-67 (greater than 30%), greater lymph node disease burden, or positive margins.   Given that the patient does not have any of these risk factors, I am comfortable with close observation

## 2023-02-10 PROBLEM — D22.9 NEVUS: Status: ACTIVE | Noted: 2023-02-10

## 2023-02-10 PROBLEM — M54.2 CERVICAL SPINE PAIN: Status: ACTIVE | Noted: 2023-02-10

## 2023-02-10 PROBLEM — F43.9 STRESS AT HOME: Status: ACTIVE | Noted: 2023-02-10

## 2023-02-10 PROBLEM — R11.2 NAUSEA AND/OR VOMITING: Status: ACTIVE | Noted: 2023-02-10

## 2023-02-10 PROBLEM — L85.3 XEROSIS OF SKIN: Status: ACTIVE | Noted: 2023-02-10

## 2023-02-10 PROBLEM — M79.676 TOE PAIN: Status: ACTIVE | Noted: 2023-02-10

## 2023-02-10 PROBLEM — B37.31 VAGINITIS DUE TO CANDIDA: Status: ACTIVE | Noted: 2023-02-10

## 2023-02-10 PROBLEM — E87.6 HYPOKALEMIA: Status: ACTIVE | Noted: 2023-02-10

## 2023-02-10 PROBLEM — J32.9 SINOBRONCHITIS: Status: ACTIVE | Noted: 2023-02-10

## 2023-02-10 PROBLEM — L30.1 DYSHIDROSIS: Status: ACTIVE | Noted: 2023-02-10

## 2023-02-10 PROBLEM — E66.9 CLASS 1 OBESITY WITH BODY MASS INDEX (BMI) OF 32.0 TO 32.9 IN ADULT: Status: ACTIVE | Noted: 2023-02-10

## 2023-02-10 PROBLEM — E66.811 CLASS 1 OBESITY WITH BODY MASS INDEX (BMI) OF 32.0 TO 32.9 IN ADULT: Status: ACTIVE | Noted: 2023-02-10

## 2023-02-10 PROBLEM — R29.898 SHOULDER WEAKNESS: Status: ACTIVE | Noted: 2023-02-10

## 2023-02-10 PROBLEM — H04.129 DRY EYE SYNDROME: Status: ACTIVE | Noted: 2023-02-10

## 2023-02-10 PROBLEM — Z96.659 S/P TOTAL KNEE REPLACEMENT: Status: ACTIVE | Noted: 2023-02-10

## 2023-02-10 PROBLEM — M25.569 KNEE PAIN: Status: ACTIVE | Noted: 2023-02-10

## 2023-02-10 PROBLEM — M17.9 OA (OSTEOARTHRITIS) OF KNEE: Status: ACTIVE | Noted: 2023-02-10

## 2023-02-10 PROBLEM — R09.81 NASAL CONGESTION: Status: ACTIVE | Noted: 2023-02-10

## 2023-02-10 PROBLEM — G89.29 CHRONIC PAIN OF TOE OF RIGHT FOOT: Status: ACTIVE | Noted: 2023-02-10

## 2023-02-10 PROBLEM — H26.491 POSTERIOR CAPSULAR OPACIFICATION NON VISUALLY SIGNIFICANT OF RIGHT EYE: Status: ACTIVE | Noted: 2023-02-10

## 2023-02-10 PROBLEM — F32.9 REACTIVE DEPRESSION: Status: ACTIVE | Noted: 2023-02-10

## 2023-02-10 PROBLEM — M72.2 PLANTAR FASCIITIS: Status: ACTIVE | Noted: 2023-02-10

## 2023-02-10 PROBLEM — L84 PRE-ULCERATIVE CORN OR CALLOUS: Status: ACTIVE | Noted: 2023-02-10

## 2023-02-10 PROBLEM — H69.93 DYSFUNCTION OF BOTH EUSTACHIAN TUBES: Status: ACTIVE | Noted: 2023-02-10

## 2023-02-10 PROBLEM — M19.049 ARTHRITIS OF HAND: Status: ACTIVE | Noted: 2023-02-10

## 2023-02-10 PROBLEM — N39.0 UTI (URINARY TRACT INFECTION): Status: ACTIVE | Noted: 2023-02-10

## 2023-02-10 PROBLEM — G45.9 TIA (TRANSIENT ISCHEMIC ATTACK): Status: ACTIVE | Noted: 2023-02-10

## 2023-02-10 PROBLEM — E11.69 DIABETES MELLITUS TYPE 2 IN OBESE: Status: ACTIVE | Noted: 2023-02-10

## 2023-02-10 PROBLEM — M54.30 SCIATICA: Status: ACTIVE | Noted: 2023-02-10

## 2023-02-10 PROBLEM — G89.29 CHRONIC PAIN OF TOE OF LEFT FOOT: Status: ACTIVE | Noted: 2023-02-10

## 2023-02-10 PROBLEM — M25.661 STIFFNESS OF RIGHT KNEE, NOT ELSEWHERE CLASSIFIED: Status: ACTIVE | Noted: 2023-02-10

## 2023-02-10 PROBLEM — Z96.1 PSEUDOPHAKIA OF LEFT EYE: Status: ACTIVE | Noted: 2023-02-10

## 2023-02-10 PROBLEM — M19.019 OSTEOARTHRITIS, LOCALIZED, SHOULDER: Status: ACTIVE | Noted: 2023-02-10

## 2023-02-10 PROBLEM — R09.82 POST-NASAL DRIP: Status: ACTIVE | Noted: 2023-02-10

## 2023-02-10 PROBLEM — J34.829 NASAL VALVE COLLAPSE: Status: ACTIVE | Noted: 2023-02-10

## 2023-02-10 PROBLEM — F32.1 DEPRESSION, MAJOR, SINGLE EPISODE, MODERATE (MULTI): Status: ACTIVE | Noted: 2023-02-10

## 2023-02-10 PROBLEM — R91.1 LUNG NODULE: Status: ACTIVE | Noted: 2023-02-10

## 2023-02-10 PROBLEM — M95.9 BONE DEFORMITY: Status: ACTIVE | Noted: 2023-02-10

## 2023-02-10 PROBLEM — B35.1 FUNGAL NAIL INFECTION: Status: ACTIVE | Noted: 2023-02-10

## 2023-02-10 PROBLEM — M25.461 EFFUSION OF RIGHT KNEE: Status: ACTIVE | Noted: 2023-02-10

## 2023-02-10 PROBLEM — E11.9 DIABETES MELLITUS TYPE II, CONTROLLED, WITH NO COMPLICATIONS (MULTI): Status: ACTIVE | Noted: 2023-02-10

## 2023-02-10 PROBLEM — L82.1 SEBORRHEIC KERATOSIS: Status: ACTIVE | Noted: 2023-02-10

## 2023-02-10 PROBLEM — K21.00 GASTRO-ESOPHAGEAL REFLUX DISEASE WITH ESOPHAGITIS: Status: ACTIVE | Noted: 2023-02-10

## 2023-02-10 PROBLEM — K59.00 CONSTIPATION: Status: ACTIVE | Noted: 2023-02-10

## 2023-02-10 PROBLEM — S86.819S: Status: ACTIVE | Noted: 2023-02-10

## 2023-02-10 PROBLEM — Z96.1 PSEUDOPHAKIA OF RIGHT EYE: Status: ACTIVE | Noted: 2023-02-10

## 2023-02-10 PROBLEM — R79.9 ABNORMAL BLOOD CHEMISTRY: Status: ACTIVE | Noted: 2023-02-10

## 2023-02-10 PROBLEM — N18.30 CKD (CHRONIC KIDNEY DISEASE) STAGE 3, GFR 30-59 ML/MIN (MULTI): Status: ACTIVE | Noted: 2023-02-10

## 2023-02-10 PROBLEM — J30.9 ALLERGIC RHINITIS: Status: ACTIVE | Noted: 2023-02-10

## 2023-02-10 PROBLEM — H00.019 STYE: Status: ACTIVE | Noted: 2023-02-10

## 2023-02-10 PROBLEM — E66.9 DIABETES MELLITUS TYPE 2 IN OBESE: Status: ACTIVE | Noted: 2023-02-10

## 2023-02-10 PROBLEM — F43.20 ADULT SITUATIONAL STRESS DISORDER: Status: ACTIVE | Noted: 2023-02-10

## 2023-02-10 PROBLEM — E78.5 HYPERLIPIDEMIA: Status: ACTIVE | Noted: 2023-02-10

## 2023-02-10 PROBLEM — R80.9 PROTEINURIA DUE TO TYPE 2 DIABETES MELLITUS (MULTI): Status: ACTIVE | Noted: 2023-02-10

## 2023-02-10 PROBLEM — M25.519 SHOULDER PAIN: Status: ACTIVE | Noted: 2023-02-10

## 2023-02-10 PROBLEM — R42 DIZZINESS: Status: ACTIVE | Noted: 2023-02-10

## 2023-02-10 PROBLEM — Z04.9 CONDITION NOT FOUND: Status: ACTIVE | Noted: 2023-02-10

## 2023-02-10 PROBLEM — C34.11 MALIGNANT NEOPLASM OF UPPER LOBE OF RIGHT LUNG (MULTI): Status: ACTIVE | Noted: 2023-02-10

## 2023-02-10 PROBLEM — E83.52 HYPERCALCEMIA: Status: ACTIVE | Noted: 2023-02-10

## 2023-02-10 PROBLEM — E11.21 CONTROLLED DIABETES MELLITUS WITH DIABETIC NEPHROPATHY (MULTI): Status: ACTIVE | Noted: 2023-02-10

## 2023-02-10 PROBLEM — G47.62 NOCTURNAL LEG CRAMPS: Status: ACTIVE | Noted: 2023-02-10

## 2023-02-10 PROBLEM — M25.561 RIGHT KNEE PAIN: Status: ACTIVE | Noted: 2023-02-10

## 2023-02-10 PROBLEM — M62.838 MUSCLE SPASM: Status: ACTIVE | Noted: 2023-02-10

## 2023-02-10 PROBLEM — M79.675 CHRONIC PAIN OF TOE OF LEFT FOOT: Status: ACTIVE | Noted: 2023-02-10

## 2023-02-10 PROBLEM — E11.29 PROTEINURIA DUE TO TYPE 2 DIABETES MELLITUS (MULTI): Status: ACTIVE | Noted: 2023-02-10

## 2023-02-10 PROBLEM — N18.2 STAGE 2 CHRONIC KIDNEY DISEASE: Status: ACTIVE | Noted: 2023-02-10

## 2023-02-10 PROBLEM — R68.2 DRY MOUTH NOT DUE TO SICCA SYNDROME: Status: ACTIVE | Noted: 2023-02-10

## 2023-02-10 PROBLEM — H53.462 HEMIANOPIA OF LEFT EYE: Status: ACTIVE | Noted: 2023-02-10

## 2023-02-10 PROBLEM — H26.492 POSTERIOR CAPSULAR OPACIFICATION NON VISUALLY SIGNIFICANT OF LEFT EYE: Status: ACTIVE | Noted: 2023-02-10

## 2023-02-10 PROBLEM — M43.6 STIFFNESS OF CERVICAL SPINE: Status: ACTIVE | Noted: 2023-02-10

## 2023-02-10 PROBLEM — M54.2 NECK PAIN: Status: ACTIVE | Noted: 2023-02-10

## 2023-02-10 PROBLEM — R53.83 FATIGUE: Status: ACTIVE | Noted: 2023-02-10

## 2023-02-10 PROBLEM — H53.122 TRANSIENT VISUAL LOSS OF LEFT EYE: Status: ACTIVE | Noted: 2023-02-10

## 2023-02-10 PROBLEM — D25.9 FIBROID UTERUS: Status: ACTIVE | Noted: 2023-02-10

## 2023-02-10 PROBLEM — J40 SINOBRONCHITIS: Status: ACTIVE | Noted: 2023-02-10

## 2023-02-10 PROBLEM — D36.13 NEUROMA OF FOOT: Status: ACTIVE | Noted: 2023-02-10

## 2023-02-10 PROBLEM — H53.9 CHANGE IN VISION: Status: ACTIVE | Noted: 2023-02-10

## 2023-02-10 PROBLEM — I10 HTN (HYPERTENSION): Status: ACTIVE | Noted: 2023-02-10

## 2023-02-10 PROBLEM — H81.10 BPV (BENIGN POSITIONAL VERTIGO): Status: ACTIVE | Noted: 2023-02-10

## 2023-02-10 PROBLEM — M79.674 CHRONIC PAIN OF TOE OF RIGHT FOOT: Status: ACTIVE | Noted: 2023-02-10

## 2023-02-10 PROBLEM — E55.9 VITAMIN D DEFICIENCY: Status: ACTIVE | Noted: 2023-02-10

## 2023-02-10 PROBLEM — N90.7 VULVAR CYST: Status: ACTIVE | Noted: 2023-02-10

## 2023-02-10 PROBLEM — R22.1 FULLNESS OF NECK: Status: ACTIVE | Noted: 2023-02-10

## 2023-02-10 PROBLEM — N39.41 URGE INCONTINENCE OF URINE: Status: ACTIVE | Noted: 2023-02-10

## 2023-02-10 PROBLEM — F43.22 ACUTE ADJUSTMENT DISORDER WITH ANXIETY: Status: ACTIVE | Noted: 2023-02-10

## 2023-02-10 PROBLEM — H40.003 GLAUCOMA SUSPECT OF BOTH EYES: Status: ACTIVE | Noted: 2023-02-10

## 2023-02-10 PROBLEM — M95.0 NASAL VALVE COLLAPSE: Status: ACTIVE | Noted: 2023-02-10

## 2023-02-10 RX ORDER — DULAGLUTIDE 0.75 MG/.5ML
0.75 INJECTION, SOLUTION SUBCUTANEOUS
COMMUNITY
End: 2023-12-11 | Stop reason: SDUPTHER

## 2023-02-10 RX ORDER — ROSUVASTATIN CALCIUM 10 MG/1
1 TABLET, COATED ORAL NIGHTLY
COMMUNITY
Start: 2021-12-06 | End: 2023-08-30 | Stop reason: SDUPTHER

## 2023-02-10 RX ORDER — MAGNESIUM GLUCONATE 27.5 (500)
TABLET ORAL
COMMUNITY
Start: 2019-09-03

## 2023-02-10 RX ORDER — ACETAMINOPHEN 160 MG/5ML
SUSPENSION, ORAL (FINAL DOSE FORM) ORAL
COMMUNITY
Start: 2019-09-03

## 2023-02-10 RX ORDER — ACETAMINOPHEN 500 MG
TABLET ORAL
COMMUNITY
Start: 2019-09-03

## 2023-02-10 RX ORDER — ACETAMINOPHEN, DEXTROMETHORPHAN HBR, DOXYLAMINE SUCCINATE, PHENYLEPHRINE HCL 650; 20; 12.5; 1 MG/30ML; MG/30ML; MG/30ML; MG/30ML
1 SOLUTION ORAL DAILY
COMMUNITY
Start: 2018-10-11

## 2023-02-10 RX ORDER — ASPIRIN 81 MG/1
TABLET ORAL
COMMUNITY
Start: 2019-09-03

## 2023-02-10 RX ORDER — SPIRONOLACTONE 25 MG/1
1 TABLET ORAL DAILY
COMMUNITY
Start: 2019-12-11 | End: 2023-03-17 | Stop reason: SDUPTHER

## 2023-02-10 RX ORDER — LANCETS
EACH MISCELLANEOUS
COMMUNITY

## 2023-02-10 RX ORDER — CALCIUM CARBONATE/VITAMIN D3 600MG-5MCG
TABLET ORAL
COMMUNITY
Start: 2019-09-03

## 2023-02-10 RX ORDER — OXYBUTYNIN CHLORIDE 15 MG/1
1 TABLET, EXTENDED RELEASE ORAL DAILY
COMMUNITY
Start: 2019-09-12 | End: 2023-05-12

## 2023-02-10 RX ORDER — BUPROPION HYDROCHLORIDE 150 MG/1
1 TABLET, EXTENDED RELEASE ORAL 2 TIMES DAILY
COMMUNITY
Start: 2018-10-30 | End: 2023-03-23 | Stop reason: SDUPTHER

## 2023-02-10 RX ORDER — METFORMIN HYDROCHLORIDE 1000 MG/1
1 TABLET ORAL EVERY 12 HOURS
COMMUNITY
Start: 2013-11-18 | End: 2023-11-16 | Stop reason: SDUPTHER

## 2023-02-10 RX ORDER — BLOOD SUGAR DIAGNOSTIC
1 STRIP MISCELLANEOUS DAILY
COMMUNITY

## 2023-02-10 RX ORDER — OMEPRAZOLE 20 MG/1
1 CAPSULE, DELAYED RELEASE ORAL DAILY
COMMUNITY
Start: 2012-03-12 | End: 2023-08-30 | Stop reason: SDUPTHER

## 2023-02-10 RX ORDER — CHLORTHALIDONE 25 MG/1
1 TABLET ORAL DAILY
COMMUNITY
Start: 2020-06-18 | End: 2023-08-30 | Stop reason: SDUPTHER

## 2023-02-10 RX ORDER — LOSARTAN POTASSIUM 100 MG/1
1 TABLET ORAL DAILY
COMMUNITY
End: 2023-08-30 | Stop reason: SDUPTHER

## 2023-02-10 RX ORDER — ESCITALOPRAM OXALATE 20 MG/1
1 TABLET ORAL DAILY
COMMUNITY
Start: 2018-10-04 | End: 2023-03-23 | Stop reason: SINTOL

## 2023-03-14 ENCOUNTER — TELEPHONE (OUTPATIENT)
Dept: PRIMARY CARE | Facility: CLINIC | Age: 73
End: 2023-03-14
Payer: COMMERCIAL

## 2023-03-14 NOTE — TELEPHONE ENCOUNTER
----- Message from Irvin Muñoz MD sent at 3/13/2023  2:06 PM EDT -----  Please call pt with abnormal lab/test. Needs test/lab rev next visit.  Labs are stable. Kidney stable

## 2023-03-17 DIAGNOSIS — Z00.00 HEALTH CARE MAINTENANCE: Primary | ICD-10-CM

## 2023-03-19 RX ORDER — SPIRONOLACTONE 25 MG/1
25 TABLET ORAL ONCE
Qty: 90 TABLET | Refills: 0 | Status: SHIPPED | OUTPATIENT
Start: 2023-03-19 | End: 2023-03-28 | Stop reason: SDUPTHER

## 2023-03-23 ENCOUNTER — OFFICE VISIT (OUTPATIENT)
Dept: PRIMARY CARE | Facility: CLINIC | Age: 73
End: 2023-03-23
Payer: COMMERCIAL

## 2023-03-23 VITALS
OXYGEN SATURATION: 97 % | TEMPERATURE: 97.3 F | WEIGHT: 144.8 LBS | HEART RATE: 85 BPM | BODY MASS INDEX: 26.65 KG/M2 | HEIGHT: 62 IN | DIASTOLIC BLOOD PRESSURE: 60 MMHG | SYSTOLIC BLOOD PRESSURE: 110 MMHG | RESPIRATION RATE: 16 BRPM

## 2023-03-23 DIAGNOSIS — C34.11 MALIGNANT NEOPLASM OF UPPER LOBE OF RIGHT LUNG (MULTI): Primary | ICD-10-CM

## 2023-03-23 DIAGNOSIS — M85.859 OSTEOPENIA OF NECK OF FEMUR, UNSPECIFIED LATERALITY: ICD-10-CM

## 2023-03-23 DIAGNOSIS — E11.9 CONTROLLED TYPE 2 DIABETES MELLITUS WITHOUT COMPLICATION, WITHOUT LONG-TERM CURRENT USE OF INSULIN (MULTI): ICD-10-CM

## 2023-03-23 DIAGNOSIS — I10 PRIMARY HYPERTENSION: ICD-10-CM

## 2023-03-23 DIAGNOSIS — E83.52 HYPERCALCEMIA: ICD-10-CM

## 2023-03-23 DIAGNOSIS — R91.1 LUNG NODULE: ICD-10-CM

## 2023-03-23 DIAGNOSIS — R25.1 TREMOR OF BOTH HANDS: ICD-10-CM

## 2023-03-23 DIAGNOSIS — N18.31 STAGE 3A CHRONIC KIDNEY DISEASE (MULTI): ICD-10-CM

## 2023-03-23 PROBLEM — H69.90 EUSTACHIAN TUBE DYSFUNCTION: Status: ACTIVE | Noted: 2023-03-23

## 2023-03-23 PROBLEM — Z90.2 STATUS POST LOBECTOMY OF LUNG: Status: ACTIVE | Noted: 2022-04-28

## 2023-03-23 PROBLEM — E11.649 DIABETIC HYPOGLYCEMIA (MULTI): Status: ACTIVE | Noted: 2023-03-23

## 2023-03-23 PROCEDURE — 1159F MED LIST DOCD IN RCRD: CPT | Performed by: INTERNAL MEDICINE

## 2023-03-23 PROCEDURE — 4010F ACE/ARB THERAPY RXD/TAKEN: CPT | Performed by: INTERNAL MEDICINE

## 2023-03-23 PROCEDURE — 3078F DIAST BP <80 MM HG: CPT | Performed by: INTERNAL MEDICINE

## 2023-03-23 PROCEDURE — 99214 OFFICE O/P EST MOD 30 MIN: CPT | Performed by: INTERNAL MEDICINE

## 2023-03-23 PROCEDURE — 3074F SYST BP LT 130 MM HG: CPT | Performed by: INTERNAL MEDICINE

## 2023-03-23 PROCEDURE — 1160F RVW MEDS BY RX/DR IN RCRD: CPT | Performed by: INTERNAL MEDICINE

## 2023-03-23 PROCEDURE — 2028F FOOT EXAM PERFORMED: CPT | Performed by: INTERNAL MEDICINE

## 2023-03-23 PROCEDURE — 1157F ADVNC CARE PLAN IN RCRD: CPT | Performed by: INTERNAL MEDICINE

## 2023-03-23 RX ORDER — BUPROPION HYDROCHLORIDE 200 MG/1
200 TABLET, EXTENDED RELEASE ORAL 2 TIMES DAILY
Qty: 60 TABLET | Refills: 1 | Status: SHIPPED | OUTPATIENT
Start: 2023-03-23 | End: 2023-03-30

## 2023-03-23 ASSESSMENT — ENCOUNTER SYMPTOMS
UNEXPECTED WEIGHT CHANGE: 0
DIARRHEA: 0
NAUSEA: 0
ARTHRALGIAS: 0
CHILLS: 0
SLEEP DISTURBANCE: 0
FATIGUE: 1
SHORTNESS OF BREATH: 0
DIZZINESS: 0
CONFUSION: 0
SORE THROAT: 0
JOINT SWELLING: 0
CARDIOVASCULAR NEGATIVE: 1
CONSTIPATION: 0
ADENOPATHY: 0
VOMITING: 0
WHEEZING: 0
CHEST TIGHTNESS: 0
DYSURIA: 0
ABDOMINAL PAIN: 0
COUGH: 0
RESPIRATORY NEGATIVE: 1
PALPITATIONS: 0
WEAKNESS: 0

## 2023-03-23 ASSESSMENT — PATIENT HEALTH QUESTIONNAIRE - PHQ9
2. FEELING DOWN, DEPRESSED OR HOPELESS: NOT AT ALL
SUM OF ALL RESPONSES TO PHQ9 QUESTIONS 1 AND 2: 0
1. LITTLE INTEREST OR PLEASURE IN DOING THINGS: NOT AT ALL

## 2023-03-23 NOTE — PATIENT INSTRUCTIONS
Stop lexapro, increase Wellbutrin to 400 md /day  Have labs before next zena  See neurology  Fu in 2 month

## 2023-03-23 NOTE — PROGRESS NOTES
Subjective   Nan Crane is a 73 y.o. female who presents for Follow-up (Follow up HTN/Dexa-2.22.2023/Labs -3.13.2023/CT chest - 3.13.2023 ordered by pulm dr ./HONG with hematologist  - 3.202.2023).  HTN- checking sometimes is been good   Sister Krissy in the room with patient   Dr Vines asked dr Muñoz to revue the calcium level  Hands shacking for over  1 year now, possible side effect from meds ?    All labs rev, ca is 10.6 , will monitor was normal before  Ct abd/chest done to have pet scan don in June    Review of Systems   Constitutional:  Positive for fatigue. Negative for chills and unexpected weight change.        Comment   HENT:  Negative for congestion, ear pain and sore throat.    Respiratory: Negative.  Negative for cough, chest tightness, shortness of breath and wheezing.    Cardiovascular: Negative.  Negative for palpitations and leg swelling.   Gastrointestinal:  Negative for abdominal pain, constipation, diarrhea, nausea and vomiting.   Genitourinary:  Negative for dysuria and urgency.   Musculoskeletal:  Negative for arthralgias and joint swelling.   Skin:  Negative for rash.   Neurological:  Negative for dizziness and weakness.   Hematological:  Negative for adenopathy.   Psychiatric/Behavioral:  Negative for confusion and sleep disturbance.        Objective   Physical Exam  Constitutional:       Appearance: Normal appearance.   HENT:      Head: Normocephalic and atraumatic.   Eyes:      Conjunctiva/sclera: Conjunctivae normal.   Neck:      Vascular: No carotid bruit.   Cardiovascular:      Rate and Rhythm: Normal rate and regular rhythm.      Heart sounds: No murmur heard.  Pulmonary:      Effort: No respiratory distress.      Breath sounds: No wheezing, rhonchi or rales.   Chest:      Chest wall: No tenderness.   Abdominal:      General: Bowel sounds are normal. There is no distension.      Palpations: Abdomen is soft. There is no mass.      Tenderness: There is no abdominal tenderness.  "  Musculoskeletal:         General: No swelling.      Cervical back: Neck supple.      Right lower leg: No edema.      Left lower leg: No edema.   Lymphadenopathy:      Cervical: No cervical adenopathy.   Skin:     Coloration: Skin is not jaundiced.      Findings: No rash.   Neurological:      General: No focal deficit present.      Mental Status: She is alert and oriented to person, place, and time. Mental status is at baseline.      Motor: No weakness.      Gait: Gait normal.   Psychiatric:         Mood and Affect: Mood normal.         Behavior: Behavior normal.         Judgment: Judgment normal.       /60 (Patient Position: Sitting)   Pulse 85   Temp 36.3 °C (97.3 °F)   Resp 16   Ht 1.575 m (5' 2\")   Wt 65.7 kg (144 lb 12.8 oz)   SpO2 97% Comment: RA  BMI 26.48 kg/m²     Assessment/Plan   Problem List Items Addressed This Visit       CKD (chronic kidney disease) stage 3, GFR 30-59 ml/min (CMS/[facility])    Diabetes mellitus type II, controlled, with no complications (CMS/[facility])    HTN (hypertension)    Hypercalcemia    Relevant Orders    Comprehensive Metabolic Panel    Parathyroid Hormone, Intact    Lung nodule    Malignant neoplasm of upper lobe of right lung (CMS/HCC) - Primary    Osteopenia of neck of femur     Other Visit Diagnoses       Tremor of both hands        will dc lexapro, increase walbutrin to 300 mg, monitor   to see neurology               "

## 2023-03-27 ENCOUNTER — TELEPHONE (OUTPATIENT)
Dept: PRIMARY CARE | Facility: CLINIC | Age: 73
End: 2023-03-27
Payer: COMMERCIAL

## 2023-03-27 DIAGNOSIS — Z00.00 HEALTH CARE MAINTENANCE: ICD-10-CM

## 2023-03-28 RX ORDER — SPIRONOLACTONE 25 MG/1
25 TABLET ORAL DAILY
Qty: 90 TABLET | Refills: 0 | Status: SHIPPED | OUTPATIENT
Start: 2023-03-28 | End: 2023-09-18

## 2023-03-30 DIAGNOSIS — R25.1 TREMOR OF BOTH HANDS: ICD-10-CM

## 2023-03-30 RX ORDER — BUPROPION HYDROCHLORIDE 200 MG/1
TABLET, EXTENDED RELEASE ORAL
Qty: 180 TABLET | Refills: 1 | Status: SHIPPED | OUTPATIENT
Start: 2023-03-30 | End: 2023-09-26 | Stop reason: SDUPTHER

## 2023-05-12 DIAGNOSIS — Z00.00 ENCOUNTER FOR GENERAL ADULT MEDICAL EXAMINATION WITHOUT ABNORMAL FINDINGS: Primary | ICD-10-CM

## 2023-05-12 RX ORDER — OXYBUTYNIN CHLORIDE 15 MG/1
TABLET, EXTENDED RELEASE ORAL
Qty: 90 TABLET | Refills: 0 | Status: SHIPPED | OUTPATIENT
Start: 2023-05-12 | End: 2023-08-30 | Stop reason: SDUPTHER

## 2023-05-18 ENCOUNTER — LAB (OUTPATIENT)
Dept: LAB | Facility: LAB | Age: 73
End: 2023-05-18
Payer: COMMERCIAL

## 2023-05-18 DIAGNOSIS — E83.52 HYPERCALCEMIA: ICD-10-CM

## 2023-05-18 LAB
ALANINE AMINOTRANSFERASE (SGPT) (U/L) IN SER/PLAS: 27 U/L (ref 7–45)
ALBUMIN (G/DL) IN SER/PLAS: 4.8 G/DL (ref 3.4–5)
ALKALINE PHOSPHATASE (U/L) IN SER/PLAS: 68 U/L (ref 33–136)
ANION GAP IN SER/PLAS: 18 MMOL/L (ref 10–20)
ASPARTATE AMINOTRANSFERASE (SGOT) (U/L) IN SER/PLAS: 22 U/L (ref 9–39)
BILIRUBIN TOTAL (MG/DL) IN SER/PLAS: 0.6 MG/DL (ref 0–1.2)
CALCIUM (MG/DL) IN SER/PLAS: 10.7 MG/DL (ref 8.6–10.3)
CARBON DIOXIDE, TOTAL (MMOL/L) IN SER/PLAS: 24 MMOL/L (ref 21–32)
CHLORIDE (MMOL/L) IN SER/PLAS: 101 MMOL/L (ref 98–107)
CREATININE (MG/DL) IN SER/PLAS: 1.44 MG/DL (ref 0.5–1.05)
GFR FEMALE: 39 ML/MIN/1.73M2
GLUCOSE (MG/DL) IN SER/PLAS: 126 MG/DL (ref 74–99)
PARATHYRIN INTACT (PG/ML) IN SER/PLAS: 25.6 PG/ML (ref 18.5–88)
POTASSIUM (MMOL/L) IN SER/PLAS: 4.7 MMOL/L (ref 3.5–5.3)
PROTEIN TOTAL: 7.5 G/DL (ref 6.4–8.2)
SODIUM (MMOL/L) IN SER/PLAS: 138 MMOL/L (ref 136–145)
UREA NITROGEN (MG/DL) IN SER/PLAS: 41 MG/DL (ref 6–23)

## 2023-05-18 PROCEDURE — 83970 ASSAY OF PARATHORMONE: CPT

## 2023-05-18 PROCEDURE — 36415 COLL VENOUS BLD VENIPUNCTURE: CPT

## 2023-05-18 PROCEDURE — 80053 COMPREHEN METABOLIC PANEL: CPT

## 2023-05-19 ENCOUNTER — OFFICE VISIT (OUTPATIENT)
Dept: PRIMARY CARE | Facility: CLINIC | Age: 73
End: 2023-05-19
Payer: COMMERCIAL

## 2023-05-19 VITALS
HEART RATE: 90 BPM | DIASTOLIC BLOOD PRESSURE: 60 MMHG | SYSTOLIC BLOOD PRESSURE: 110 MMHG | HEIGHT: 62 IN | WEIGHT: 147.6 LBS | RESPIRATION RATE: 16 BRPM | OXYGEN SATURATION: 96 % | TEMPERATURE: 97.7 F | BODY MASS INDEX: 27.16 KG/M2

## 2023-05-19 DIAGNOSIS — M19.019 LOCALIZED OSTEOARTHROSIS, SHOULDER REGION, UNSPECIFIED LATERALITY: ICD-10-CM

## 2023-05-19 DIAGNOSIS — H00.012 HORDEOLUM EXTERNUM OF RIGHT LOWER EYELID: ICD-10-CM

## 2023-05-19 DIAGNOSIS — I10 PRIMARY HYPERTENSION: ICD-10-CM

## 2023-05-19 DIAGNOSIS — E11.649 DIABETIC HYPOGLYCEMIA (MULTI): ICD-10-CM

## 2023-05-19 DIAGNOSIS — N18.31 STAGE 3A CHRONIC KIDNEY DISEASE (MULTI): ICD-10-CM

## 2023-05-19 DIAGNOSIS — E11.9 CONTROLLED TYPE 2 DIABETES MELLITUS WITHOUT COMPLICATION, WITHOUT LONG-TERM CURRENT USE OF INSULIN (MULTI): ICD-10-CM

## 2023-05-19 DIAGNOSIS — E83.52 HYPERCALCEMIA: Primary | ICD-10-CM

## 2023-05-19 DIAGNOSIS — F32.1 DEPRESSION, MAJOR, SINGLE EPISODE, MODERATE (MULTI): ICD-10-CM

## 2023-05-19 PROCEDURE — 3078F DIAST BP <80 MM HG: CPT | Performed by: INTERNAL MEDICINE

## 2023-05-19 PROCEDURE — 4010F ACE/ARB THERAPY RXD/TAKEN: CPT | Performed by: INTERNAL MEDICINE

## 2023-05-19 PROCEDURE — 1159F MED LIST DOCD IN RCRD: CPT | Performed by: INTERNAL MEDICINE

## 2023-05-19 PROCEDURE — 1157F ADVNC CARE PLAN IN RCRD: CPT | Performed by: INTERNAL MEDICINE

## 2023-05-19 PROCEDURE — 1160F RVW MEDS BY RX/DR IN RCRD: CPT | Performed by: INTERNAL MEDICINE

## 2023-05-19 PROCEDURE — 2028F FOOT EXAM PERFORMED: CPT | Performed by: INTERNAL MEDICINE

## 2023-05-19 PROCEDURE — 3074F SYST BP LT 130 MM HG: CPT | Performed by: INTERNAL MEDICINE

## 2023-05-19 PROCEDURE — 99214 OFFICE O/P EST MOD 30 MIN: CPT | Performed by: INTERNAL MEDICINE

## 2023-05-19 RX ORDER — DICLOFENAC SODIUM 10 MG/G
4 GEL TOPICAL 4 TIMES DAILY
Qty: 60 G | Refills: 3 | Status: SHIPPED | OUTPATIENT
Start: 2023-05-19

## 2023-05-19 ASSESSMENT — ENCOUNTER SYMPTOMS
DIZZINESS: 0
HEADACHES: 0
WHEEZING: 0
ARTHRALGIAS: 1
ABDOMINAL PAIN: 0
CONFUSION: 0
DYSURIA: 0
CHEST TIGHTNESS: 0
CHILLS: 0
CARDIOVASCULAR NEGATIVE: 1
RESPIRATORY NEGATIVE: 1
VOMITING: 0
SHORTNESS OF BREATH: 0
SLEEP DISTURBANCE: 0
PALPITATIONS: 0
SORE THROAT: 0
UNEXPECTED WEIGHT CHANGE: 0
COUGH: 0
CONSTIPATION: 0
DIARRHEA: 0
NAUSEA: 0
JOINT SWELLING: 0
WEAKNESS: 0
FATIGUE: 0
ADENOPATHY: 0

## 2023-05-19 ASSESSMENT — PATIENT HEALTH QUESTIONNAIRE - PHQ9
SUM OF ALL RESPONSES TO PHQ9 QUESTIONS 1 AND 2: 0
2. FEELING DOWN, DEPRESSED OR HOPELESS: NOT AT ALL
1. LITTLE INTEREST OR PLEASURE IN DOING THINGS: NOT AT ALL

## 2023-05-19 NOTE — PATIENT INSTRUCTIONS
"Was nice seeing you today.  Continue same medication.  Have lab work done before next appointment if labs were ordered today.  Fu in 3 month.  Call/ contact our office with any concerns.   If labs/test was ordered at this appointment we will notify you by phone once results are available and will be posted on patient portal for your review. A follow up appointment might be needed to further discuss results and next steps of care.   Laps in communication might be possible so , please, do not assume that results are \"normal\" if you don't hear from us. Call our office, in about a week,  after you had the test/labs done if you didn't received a call from us regarding your results so we can avoid any lapses in care.     Please bring all medicines, vitamins, and herbal supplements with you or the medication list when you come to the office for your appointment.     Prescriptions will not be filled unless you are compliant with your follow up appointments or have a follow up appointment scheduled as per the instruction of your physician.  Refills for medication should be requested at the time of your office visit or , at least, 3 to 4 days in advance of when you run out of  your medication.    If , for any reasons , you need to change your appointment , please, call 24 hours in advance so we can assign your time to an other patient in need.      Thank you for being a patient with us. Our  goal is to provide high quality medical care. Following today's visit, please check your e mail for an invitation to complete our patient satisfaction survey. By sharing your feedback, you will help us continue our mission to provide excellent medical care and continue to improve and address your concerns. Your participation in the survey is voluntary and completely confidential. We appreciate your thoughts regarding today's visit. Thank you.    "

## 2023-05-19 NOTE — PROGRESS NOTES
Subjective   Nan Crane is a 73 y.o. female who presents for Follow-up (Follow up labs - 5.18.2023/R eye infection x 1 1/2 week). Has a pimple,  Follow up on labs - 5.18.2024  Follow up on depression meds - feeling good with new TX   R eye infection 1 1/2 week now, tried eye wash ,not helping, itchy and red  ,pimple eye lead lower side   Arthritis  for years taking Tylenol ,not helping- [place], shoulders and neck, R  buttock area pain too  Sister Krissy in with the patient today  Needs new handicap placard  No  meds refills request today    No medication side effects  Needs medication refills    no  Since off lexapro and increase in Wellbutrin no more tremors  Labs and test reviewed, discussed with patient plan  Dexa, ct scan rev.    Review of Systems   Constitutional:  Negative for chills, fatigue and unexpected weight change.        Comment   HENT:  Negative for congestion, ear pain and sore throat.    Respiratory: Negative.  Negative for cough, chest tightness, shortness of breath and wheezing.    Cardiovascular: Negative.  Negative for palpitations and leg swelling.   Gastrointestinal:  Negative for abdominal pain, constipation, diarrhea, nausea and vomiting.   Genitourinary:  Negative for dysuria and urgency.   Musculoskeletal:  Positive for arthralgias. Negative for joint swelling.   Skin:  Negative for rash.   Neurological:  Negative for dizziness, weakness and headaches.   Hematological:  Negative for adenopathy.   Psychiatric/Behavioral:  Negative for confusion and sleep disturbance.        Objective   Physical Exam  Constitutional:       Appearance: Normal appearance.   HENT:      Head: Normocephalic and atraumatic.   Eyes:      Pupils: Pupils are equal, round, and reactive to light.   Cardiovascular:      Rate and Rhythm: Normal rate and regular rhythm.   Pulmonary:      Effort: Pulmonary effort is normal.      Breath sounds: Normal breath sounds.   Musculoskeletal:         General: Normal range of motion.  "     Cervical back: Normal range of motion and neck supple.   Skin:     General: Skin is warm.   Neurological:      General: No focal deficit present.      Mental Status: She is alert and oriented to person, place, and time.   Psychiatric:         Mood and Affect: Mood normal.         Behavior: Behavior normal.       /60 (BP Location: Left arm, Patient Position: Sitting)   Pulse 90   Temp 36.5 °C (97.7 °F)   Resp 16   Ht 1.575 m (5' 2\")   Wt 67 kg (147 lb 9.6 oz)   SpO2 96% Comment: RA  BMI 27.00 kg/m²       Assessment/Plan   Problem List Items Addressed This Visit       CKD (chronic kidney disease) stage 3, GFR 30-59 ml/min (CMS/HCC)    Depression, major, single episode, moderate (CMS/HCC)    Diabetes mellitus type II, controlled, with no complications (CMS/HCC)    HTN (hypertension)    Hypercalcemia - Primary     Normal pth  Stop ca , vit d, will reeval  Increase po fluids         Osteoarthritis, localized, shoulder    Stye    Diabetic hypoglycemia (CMS/HCC)       "

## 2023-05-24 ENCOUNTER — APPOINTMENT (OUTPATIENT)
Dept: PRIMARY CARE | Facility: CLINIC | Age: 73
End: 2023-05-24
Payer: COMMERCIAL

## 2023-06-13 ENCOUNTER — TELEPHONE (OUTPATIENT)
Dept: PRIMARY CARE | Facility: CLINIC | Age: 73
End: 2023-06-13
Payer: COMMERCIAL

## 2023-06-26 LAB
HEMOGLOBIN A1C/HEMOGLOBIN TOTAL IN BLOOD EXTERNAL: 6.5 %
HEMOGLOBIN A1C/HEMOGLOBIN TOTAL IN BLOOD EXTERNAL: 6.5 %

## 2023-08-23 ENCOUNTER — LAB (OUTPATIENT)
Dept: LAB | Facility: LAB | Age: 73
End: 2023-08-23
Payer: COMMERCIAL

## 2023-08-23 DIAGNOSIS — N18.31 STAGE 3A CHRONIC KIDNEY DISEASE (MULTI): ICD-10-CM

## 2023-08-23 LAB
ALANINE AMINOTRANSFERASE (SGPT) (U/L) IN SER/PLAS: 26 U/L (ref 7–45)
ALBUMIN (G/DL) IN SER/PLAS: 4.5 G/DL (ref 3.4–5)
ALKALINE PHOSPHATASE (U/L) IN SER/PLAS: 67 U/L (ref 33–136)
ANION GAP IN SER/PLAS: 15 MMOL/L (ref 10–20)
ASPARTATE AMINOTRANSFERASE (SGOT) (U/L) IN SER/PLAS: 22 U/L (ref 9–39)
BILIRUBIN TOTAL (MG/DL) IN SER/PLAS: 0.5 MG/DL (ref 0–1.2)
CALCIUM (MG/DL) IN SER/PLAS: 10.2 MG/DL (ref 8.6–10.3)
CARBON DIOXIDE, TOTAL (MMOL/L) IN SER/PLAS: 26 MMOL/L (ref 21–32)
CHLORIDE (MMOL/L) IN SER/PLAS: 104 MMOL/L (ref 98–107)
CREATININE (MG/DL) IN SER/PLAS: 1.35 MG/DL (ref 0.5–1.05)
GFR FEMALE: 42 ML/MIN/1.73M2
GLUCOSE (MG/DL) IN SER/PLAS: 156 MG/DL (ref 74–99)
POTASSIUM (MMOL/L) IN SER/PLAS: 4.6 MMOL/L (ref 3.5–5.3)
PROTEIN TOTAL: 6.9 G/DL (ref 6.4–8.2)
SODIUM (MMOL/L) IN SER/PLAS: 140 MMOL/L (ref 136–145)
UREA NITROGEN (MG/DL) IN SER/PLAS: 36 MG/DL (ref 6–23)

## 2023-08-23 PROCEDURE — 36415 COLL VENOUS BLD VENIPUNCTURE: CPT

## 2023-08-23 PROCEDURE — 80053 COMPREHEN METABOLIC PANEL: CPT

## 2023-08-25 ENCOUNTER — TELEPHONE (OUTPATIENT)
Dept: PRIMARY CARE | Facility: CLINIC | Age: 73
End: 2023-08-25
Payer: COMMERCIAL

## 2023-08-30 ENCOUNTER — OFFICE VISIT (OUTPATIENT)
Dept: PRIMARY CARE | Facility: CLINIC | Age: 73
End: 2023-08-30
Payer: COMMERCIAL

## 2023-08-30 VITALS
SYSTOLIC BLOOD PRESSURE: 120 MMHG | DIASTOLIC BLOOD PRESSURE: 60 MMHG | OXYGEN SATURATION: 98 % | RESPIRATION RATE: 16 BRPM | HEART RATE: 82 BPM | TEMPERATURE: 97.2 F | WEIGHT: 154.4 LBS | BODY MASS INDEX: 28.41 KG/M2 | HEIGHT: 62 IN

## 2023-08-30 DIAGNOSIS — Z12.31 ENCOUNTER FOR SCREENING MAMMOGRAM FOR MALIGNANT NEOPLASM OF BREAST: ICD-10-CM

## 2023-08-30 DIAGNOSIS — E11.9 CONTROLLED TYPE 2 DIABETES MELLITUS WITHOUT COMPLICATION, WITHOUT LONG-TERM CURRENT USE OF INSULIN (MULTI): ICD-10-CM

## 2023-08-30 DIAGNOSIS — I15.9 SECONDARY HYPERTENSION: ICD-10-CM

## 2023-08-30 DIAGNOSIS — F51.01 PRIMARY INSOMNIA: ICD-10-CM

## 2023-08-30 DIAGNOSIS — Z00.00 HEALTH CARE MAINTENANCE: ICD-10-CM

## 2023-08-30 DIAGNOSIS — Z00.00 ANNUAL PHYSICAL EXAM: Primary | ICD-10-CM

## 2023-08-30 DIAGNOSIS — N18.2 STAGE 2 CHRONIC KIDNEY DISEASE: ICD-10-CM

## 2023-08-30 DIAGNOSIS — Z00.00 ENCOUNTER FOR GENERAL ADULT MEDICAL EXAMINATION WITHOUT ABNORMAL FINDINGS: ICD-10-CM

## 2023-08-30 PROBLEM — F32.9 REACTIVE DEPRESSION: Status: RESOLVED | Noted: 2023-02-10 | Resolved: 2023-08-30

## 2023-08-30 PROBLEM — R09.81 NASAL CONGESTION: Status: RESOLVED | Noted: 2023-02-10 | Resolved: 2023-08-30

## 2023-08-30 PROCEDURE — 4010F ACE/ARB THERAPY RXD/TAKEN: CPT | Performed by: INTERNAL MEDICINE

## 2023-08-30 PROCEDURE — 1126F AMNT PAIN NOTED NONE PRSNT: CPT | Performed by: INTERNAL MEDICINE

## 2023-08-30 PROCEDURE — 3078F DIAST BP <80 MM HG: CPT | Performed by: INTERNAL MEDICINE

## 2023-08-30 PROCEDURE — 1157F ADVNC CARE PLAN IN RCRD: CPT | Performed by: INTERNAL MEDICINE

## 2023-08-30 PROCEDURE — 3074F SYST BP LT 130 MM HG: CPT | Performed by: INTERNAL MEDICINE

## 2023-08-30 PROCEDURE — 3044F HG A1C LEVEL LT 7.0%: CPT | Performed by: INTERNAL MEDICINE

## 2023-08-30 PROCEDURE — 99214 OFFICE O/P EST MOD 30 MIN: CPT | Performed by: INTERNAL MEDICINE

## 2023-08-30 PROCEDURE — 1159F MED LIST DOCD IN RCRD: CPT | Performed by: INTERNAL MEDICINE

## 2023-08-30 PROCEDURE — 1160F RVW MEDS BY RX/DR IN RCRD: CPT | Performed by: INTERNAL MEDICINE

## 2023-08-30 PROCEDURE — 2028F FOOT EXAM PERFORMED: CPT | Performed by: INTERNAL MEDICINE

## 2023-08-30 PROCEDURE — 99397 PER PM REEVAL EST PAT 65+ YR: CPT | Performed by: INTERNAL MEDICINE

## 2023-08-30 RX ORDER — ROSUVASTATIN CALCIUM 10 MG/1
10 TABLET, COATED ORAL NIGHTLY
Qty: 90 TABLET | Refills: 3 | Status: SHIPPED | OUTPATIENT
Start: 2023-08-30 | End: 2023-10-04

## 2023-08-30 RX ORDER — OMEPRAZOLE 20 MG/1
20 CAPSULE, DELAYED RELEASE ORAL DAILY
Qty: 90 CAPSULE | Refills: 3 | Status: SHIPPED | OUTPATIENT
Start: 2023-08-30 | End: 2023-09-18 | Stop reason: SDUPTHER

## 2023-08-30 RX ORDER — CHLORTHALIDONE 25 MG/1
25 TABLET ORAL DAILY
Qty: 90 TABLET | Refills: 3 | Status: SHIPPED | OUTPATIENT
Start: 2023-08-30 | End: 2023-09-18 | Stop reason: SDUPTHER

## 2023-08-30 RX ORDER — LOSARTAN POTASSIUM 100 MG/1
100 TABLET ORAL DAILY
Qty: 90 TABLET | Refills: 3 | Status: SHIPPED | OUTPATIENT
Start: 2023-08-30

## 2023-08-30 RX ORDER — OXYBUTYNIN CHLORIDE 15 MG/1
15 TABLET, EXTENDED RELEASE ORAL DAILY
Qty: 90 TABLET | Refills: 3 | Status: SHIPPED | OUTPATIENT
Start: 2023-08-30

## 2023-08-30 ASSESSMENT — ENCOUNTER SYMPTOMS
CHEST TIGHTNESS: 0
SLEEP DISTURBANCE: 1
FATIGUE: 1
JOINT SWELLING: 0
NECK PAIN: 1
CONFUSION: 0
COUGH: 0
VOMITING: 0
CHILLS: 0
SHORTNESS OF BREATH: 0
ADENOPATHY: 0
DYSURIA: 0
SORE THROAT: 0
BACK PAIN: 1
NAUSEA: 0
ARTHRALGIAS: 1
ABDOMINAL PAIN: 0
PALPITATIONS: 0
WEAKNESS: 0
UNEXPECTED WEIGHT CHANGE: 0
DIARRHEA: 0
CONSTIPATION: 0
DIZZINESS: 0
WHEEZING: 0

## 2023-08-30 ASSESSMENT — PATIENT HEALTH QUESTIONNAIRE - PHQ9
SUM OF ALL RESPONSES TO PHQ9 QUESTIONS 1 AND 2: 0
1. LITTLE INTEREST OR PLEASURE IN DOING THINGS: NOT AT ALL
2. FEELING DOWN, DEPRESSED OR HOPELESS: NOT AT ALL

## 2023-08-30 NOTE — PATIENT INSTRUCTIONS
Was nice seeing you today.  Continue same medication.  Have lab work done before next appointment if labs were ordered today.  Fu in 3 month, cr medical issues, insomnia.  Call/ contact our office with any concerns.

## 2023-09-11 RX ORDER — FLASH GLUCOSE SENSOR
KIT MISCELLANEOUS
COMMUNITY
Start: 2023-09-07 | End: 2023-11-13 | Stop reason: SDUPTHER

## 2023-09-17 DIAGNOSIS — Z00.00 HEALTH CARE MAINTENANCE: Primary | ICD-10-CM

## 2023-09-18 DIAGNOSIS — Z00.00 HEALTH CARE MAINTENANCE: Primary | ICD-10-CM

## 2023-09-18 RX ORDER — SPIRONOLACTONE 25 MG/1
25 TABLET ORAL DAILY
Qty: 90 TABLET | Refills: 0 | Status: SHIPPED | OUTPATIENT
Start: 2023-09-18 | End: 2023-12-05 | Stop reason: SDUPTHER

## 2023-09-19 RX ORDER — CHLORTHALIDONE 25 MG/1
25 TABLET ORAL DAILY
Qty: 90 TABLET | Refills: 3 | Status: SHIPPED | OUTPATIENT
Start: 2023-09-19

## 2023-09-19 RX ORDER — OMEPRAZOLE 20 MG/1
20 CAPSULE, DELAYED RELEASE ORAL DAILY
Qty: 90 CAPSULE | Refills: 3 | Status: SHIPPED | OUTPATIENT
Start: 2023-09-19

## 2023-09-25 DIAGNOSIS — R25.1 TREMOR OF BOTH HANDS: ICD-10-CM

## 2023-09-26 RX ORDER — BUPROPION HYDROCHLORIDE 200 MG/1
TABLET, EXTENDED RELEASE ORAL
Qty: 180 TABLET | Refills: 1 | Status: SHIPPED | OUTPATIENT
Start: 2023-09-26 | End: 2023-10-03 | Stop reason: SDUPTHER

## 2023-10-03 ENCOUNTER — TELEPHONE (OUTPATIENT)
Dept: PRIMARY CARE | Facility: CLINIC | Age: 73
End: 2023-10-03
Payer: COMMERCIAL

## 2023-10-03 DIAGNOSIS — R25.1 TREMOR OF BOTH HANDS: ICD-10-CM

## 2023-10-03 DIAGNOSIS — F32.1 DEPRESSION, MAJOR, SINGLE EPISODE, MODERATE (MULTI): Primary | ICD-10-CM

## 2023-10-03 RX ORDER — BUPROPION HYDROCHLORIDE 200 MG/1
200 TABLET, EXTENDED RELEASE ORAL 2 TIMES DAILY
Qty: 180 TABLET | Refills: 1 | Status: SHIPPED | OUTPATIENT
Start: 2023-10-03 | End: 2024-03-15

## 2023-10-04 ENCOUNTER — TELEPHONE (OUTPATIENT)
Dept: ENDOCRINOLOGY | Facility: CLINIC | Age: 73
End: 2023-10-04
Payer: COMMERCIAL

## 2023-10-04 DIAGNOSIS — Z00.00 HEALTH CARE MAINTENANCE: Primary | ICD-10-CM

## 2023-10-04 DIAGNOSIS — E11.9 CONTROLLED TYPE 2 DIABETES MELLITUS WITHOUT COMPLICATION, WITHOUT LONG-TERM CURRENT USE OF INSULIN (MULTI): ICD-10-CM

## 2023-10-04 RX ORDER — ROSUVASTATIN CALCIUM 10 MG/1
10 TABLET, COATED ORAL NIGHTLY
Qty: 90 TABLET | Refills: 1 | Status: SHIPPED | OUTPATIENT
Start: 2023-10-04 | End: 2024-01-29 | Stop reason: SDUPTHER

## 2023-10-16 ENCOUNTER — OFFICE VISIT (OUTPATIENT)
Dept: ENDOCRINOLOGY | Facility: CLINIC | Age: 73
End: 2023-10-16
Payer: COMMERCIAL

## 2023-10-16 VITALS
BODY MASS INDEX: 27.01 KG/M2 | DIASTOLIC BLOOD PRESSURE: 58 MMHG | RESPIRATION RATE: 18 BRPM | HEART RATE: 92 BPM | SYSTOLIC BLOOD PRESSURE: 114 MMHG | WEIGHT: 147.7 LBS

## 2023-10-16 DIAGNOSIS — E11.9 CONTROLLED TYPE 2 DIABETES MELLITUS WITHOUT COMPLICATION, WITHOUT LONG-TERM CURRENT USE OF INSULIN (MULTI): Primary | ICD-10-CM

## 2023-10-16 DIAGNOSIS — E78.5 HYPERLIPIDEMIA, UNSPECIFIED HYPERLIPIDEMIA TYPE: ICD-10-CM

## 2023-10-16 DIAGNOSIS — Z97.8 USES SELF-APPLIED CONTINUOUS GLUCOSE MONITORING DEVICE: ICD-10-CM

## 2023-10-16 PROCEDURE — 99214 OFFICE O/P EST MOD 30 MIN: CPT | Performed by: STUDENT IN AN ORGANIZED HEALTH CARE EDUCATION/TRAINING PROGRAM

## 2023-10-16 PROCEDURE — 1126F AMNT PAIN NOTED NONE PRSNT: CPT | Performed by: STUDENT IN AN ORGANIZED HEALTH CARE EDUCATION/TRAINING PROGRAM

## 2023-10-16 PROCEDURE — 1160F RVW MEDS BY RX/DR IN RCRD: CPT | Performed by: STUDENT IN AN ORGANIZED HEALTH CARE EDUCATION/TRAINING PROGRAM

## 2023-10-16 PROCEDURE — 2028F FOOT EXAM PERFORMED: CPT | Performed by: STUDENT IN AN ORGANIZED HEALTH CARE EDUCATION/TRAINING PROGRAM

## 2023-10-16 PROCEDURE — 3074F SYST BP LT 130 MM HG: CPT | Performed by: STUDENT IN AN ORGANIZED HEALTH CARE EDUCATION/TRAINING PROGRAM

## 2023-10-16 PROCEDURE — 3044F HG A1C LEVEL LT 7.0%: CPT | Performed by: STUDENT IN AN ORGANIZED HEALTH CARE EDUCATION/TRAINING PROGRAM

## 2023-10-16 PROCEDURE — 1159F MED LIST DOCD IN RCRD: CPT | Performed by: STUDENT IN AN ORGANIZED HEALTH CARE EDUCATION/TRAINING PROGRAM

## 2023-10-16 PROCEDURE — 3078F DIAST BP <80 MM HG: CPT | Performed by: STUDENT IN AN ORGANIZED HEALTH CARE EDUCATION/TRAINING PROGRAM

## 2023-10-16 PROCEDURE — 4010F ACE/ARB THERAPY RXD/TAKEN: CPT | Performed by: STUDENT IN AN ORGANIZED HEALTH CARE EDUCATION/TRAINING PROGRAM

## 2023-10-16 PROCEDURE — 95251 CONT GLUC MNTR ANALYSIS I&R: CPT | Performed by: STUDENT IN AN ORGANIZED HEALTH CARE EDUCATION/TRAINING PROGRAM

## 2023-10-16 ASSESSMENT — ENCOUNTER SYMPTOMS: VOMITING: 1

## 2023-10-16 NOTE — PROGRESS NOTES
Subjective   Patient ID: Nan Crane is a 73 y.o. female who presents for Hypothyroidism and Diabetes (Thyroid labs done 8/9/23/Free style Jessica/Tests every day/Glucose 101/A1C 6.6).  HPI    The patient is a known diabetic for 10 years presents for follow up  In office hba1c is 6.4%     freestyle jessica downloads reviewed   target 95%   high 2 %   low 3 %     current regimen   metformin 1 gm BID   off Glipizide  Jardiance 25 mg    Trulclity 0.75 mg weekly , was Byduren 2 mg ( has difficulty opening and inject    Review of Systems   Gastrointestinal:  Positive for vomiting.   This occurs once or twice a month     Objective   Vitals:    10/16/23 1254   BP: 114/58   Pulse: 92   Resp: 18       Physical Exam  Constitutional:       Appearance: Normal appearance.   Cardiovascular:      Rate and Rhythm: Normal rate and regular rhythm.   Pulmonary:      Effort: Pulmonary effort is normal.      Breath sounds: Normal breath sounds.   Skin:     General: Skin is warm.   Neurological:      General: No focal deficit present.      Mental Status: She is alert.         Assessment/Plan        - Well controlled DM2 with hba1c of 6.6 , on oral agents and GLP1  - DM nephropathy with urine albumin/creat 205 on ARB and SGLT2  - HTN controlled on chlorthalidone , Noravsc and ARB  - HLD controlled LDL 65 ,on Rosuvastatin 10 mg   - BPV , no recent episodes.  - Newly diagnosed Carcinoid neuroendocrine tumor s/p lobectomy     plan :  - Continue Trulclity 0.75 mg ( due to difficulty using Byduren pen). GI issued started before trulclity per pt   -continue metformin and Jardiance at current dose .  - Advised to adhere to a Diabetic low carb diet.    RTC in 4 months

## 2023-11-13 DIAGNOSIS — E66.9 DIABETES MELLITUS TYPE 2 IN OBESE: ICD-10-CM

## 2023-11-13 DIAGNOSIS — E11.69 DIABETES MELLITUS TYPE 2 IN OBESE: ICD-10-CM

## 2023-11-14 RX ORDER — FLASH GLUCOSE SENSOR
KIT MISCELLANEOUS
Qty: 6 EACH | Refills: 1 | Status: SHIPPED | OUTPATIENT
Start: 2023-11-14 | End: 2024-03-28 | Stop reason: SDUPTHER

## 2023-11-16 ENCOUNTER — OFFICE VISIT (OUTPATIENT)
Dept: OPHTHALMOLOGY | Facility: CLINIC | Age: 73
End: 2023-11-16
Payer: COMMERCIAL

## 2023-11-16 DIAGNOSIS — E11.69 DIABETES MELLITUS TYPE 2 IN OBESE: Primary | ICD-10-CM

## 2023-11-16 DIAGNOSIS — H04.123 DRY EYE SYNDROME OF BOTH EYES: ICD-10-CM

## 2023-11-16 DIAGNOSIS — H40.003 GLAUCOMA SUSPECT OF BOTH EYES: ICD-10-CM

## 2023-11-16 DIAGNOSIS — E66.9 DIABETES MELLITUS TYPE 2 IN OBESE: Primary | ICD-10-CM

## 2023-11-16 DIAGNOSIS — H26.491 PCO (POSTERIOR CAPSULAR OPACIFICATION), RIGHT: ICD-10-CM

## 2023-11-16 DIAGNOSIS — H26.492 POSTERIOR CAPSULAR OPACIFICATION NON VISUALLY SIGNIFICANT OF LEFT EYE: ICD-10-CM

## 2023-11-16 LAB
AVERAGE RNFL TODAY (OD): 88 MICRONS
AVERAGE RNFL TODAY (OS): 84 MICRONS

## 2023-11-16 PROCEDURE — 92134 CPTRZ OPH DX IMG PST SGM RTA: CPT | Mod: BILATERAL PROCEDURE | Performed by: OPHTHALMOLOGY

## 2023-11-16 PROCEDURE — 99214 OFFICE O/P EST MOD 30 MIN: CPT | Performed by: OPHTHALMOLOGY

## 2023-11-16 RX ORDER — METFORMIN HYDROCHLORIDE 1000 MG/1
1000 TABLET ORAL EVERY 12 HOURS
Qty: 180 TABLET | Refills: 3 | Status: SHIPPED | OUTPATIENT
Start: 2023-11-16

## 2023-11-16 ASSESSMENT — CONF VISUAL FIELD
OS_INFERIOR_TEMPORAL_RESTRICTION: 0
OD_SUPERIOR_NASAL_RESTRICTION: 0
OS_SUPERIOR_NASAL_RESTRICTION: 0
OD_NORMAL: 1
OD_INFERIOR_NASAL_RESTRICTION: 0
OS_SUPERIOR_TEMPORAL_RESTRICTION: 0
OD_SUPERIOR_TEMPORAL_RESTRICTION: 0
OS_NORMAL: 1
OD_INFERIOR_TEMPORAL_RESTRICTION: 0
OS_INFERIOR_NASAL_RESTRICTION: 0

## 2023-11-16 ASSESSMENT — REFRACTION_MANIFEST
OD_SPHERE: +0.50
OS_AXIS: 085
OS_CYLINDER: -0.50
OD_ADD: +2.50
OS_ADD: +2.50
OS_SPHERE: -3.00
OD_AXIS: 140
OD_CYLINDER: -0.50

## 2023-11-16 ASSESSMENT — TONOMETRY
OD_IOP_MMHG: 14
OS_IOP_MMHG: 15
IOP_METHOD: GOLDMANN APPLANATION

## 2023-11-16 ASSESSMENT — VISUAL ACUITY
OD_SC: 20/30
METHOD: SNELLEN - LINEAR
OS_SC: 20/200

## 2023-11-16 ASSESSMENT — ENCOUNTER SYMPTOMS: EYES NEGATIVE: 1

## 2023-11-16 NOTE — PROGRESS NOTES
Assessment/Plan   Diagnoses and all orders for this visit:  Diabetes mellitus type 2 in obese (CMS/HCC)  Last A1c 6.5 (6/2023)  No diabetic retinopathy (DR) both eyes (OU)   Encouraged diabetes mellitus (DM) control  -     OCT, Retina - OU - Both Eyes    Glaucoma suspect of both eyes  -     OCT, Optic Nerve - OU - Both Eyes    PCO (posterior capsular opacification), right  Visually significant   Pt would like to defer YAG for now (has lung lesion and will undergo further treatment)    Dry eye syndrome of both eyes  ATs     Posterior capsular opacification non visually significant of left eye  Monitor    RTC prn for YAG right eye (OD)

## 2023-12-05 DIAGNOSIS — E11.9 CONTROLLED TYPE 2 DIABETES MELLITUS WITHOUT COMPLICATION, WITHOUT LONG-TERM CURRENT USE OF INSULIN (MULTI): ICD-10-CM

## 2023-12-11 DIAGNOSIS — E11.9 TYPE 2 DIABETES MELLITUS WITHOUT COMPLICATION, UNSPECIFIED WHETHER LONG TERM INSULIN USE (MULTI): Primary | ICD-10-CM

## 2023-12-11 RX ORDER — DULAGLUTIDE 0.75 MG/.5ML
0.75 INJECTION, SOLUTION SUBCUTANEOUS
Qty: 2 ML | Refills: 3 | Status: SHIPPED | OUTPATIENT
Start: 2023-12-11 | End: 2024-04-12

## 2023-12-20 ENCOUNTER — HOSPITAL ENCOUNTER (OUTPATIENT)
Dept: RADIOLOGY | Facility: HOSPITAL | Age: 73
Discharge: HOME | End: 2023-12-20
Payer: COMMERCIAL

## 2023-12-20 DIAGNOSIS — Z12.31 ENCOUNTER FOR SCREENING MAMMOGRAM FOR MALIGNANT NEOPLASM OF BREAST: ICD-10-CM

## 2023-12-20 PROCEDURE — 77067 SCR MAMMO BI INCL CAD: CPT

## 2023-12-20 PROCEDURE — 77063 BREAST TOMOSYNTHESIS BI: CPT | Mod: BILATERAL PROCEDURE | Performed by: RADIOLOGY

## 2023-12-20 PROCEDURE — 77067 SCR MAMMO BI INCL CAD: CPT | Mod: BILATERAL PROCEDURE | Performed by: RADIOLOGY

## 2024-01-15 ENCOUNTER — LAB (OUTPATIENT)
Dept: LAB | Facility: CLINIC | Age: 74
End: 2024-01-15
Payer: COMMERCIAL

## 2024-01-15 ENCOUNTER — HOSPITAL ENCOUNTER (OUTPATIENT)
Dept: RADIOLOGY | Facility: HOSPITAL | Age: 74
Discharge: HOME | End: 2024-01-15
Payer: COMMERCIAL

## 2024-01-15 DIAGNOSIS — C7A.8 OTHER MALIGNANT NEUROENDOCRINE TUMORS (MULTI): ICD-10-CM

## 2024-01-15 DIAGNOSIS — C34.11 MALIGNANT NEOPLASM OF UPPER LOBE OF RIGHT LUNG (MULTI): ICD-10-CM

## 2024-01-15 DIAGNOSIS — R91.1 LUNG NODULE: Primary | ICD-10-CM

## 2024-01-15 DIAGNOSIS — C34.11 MALIGNANT NEOPLASM OF UPPER LOBE OF RIGHT LUNG (MULTI): Primary | ICD-10-CM

## 2024-01-15 LAB
ALBUMIN SERPL BCP-MCNC: 4.7 G/DL (ref 3.4–5)
ALP SERPL-CCNC: 60 U/L (ref 33–136)
ALT SERPL W P-5'-P-CCNC: 15 U/L (ref 7–45)
ANION GAP SERPL CALC-SCNC: 16 MMOL/L (ref 10–20)
AST SERPL W P-5'-P-CCNC: 15 U/L (ref 9–39)
BASOPHILS # BLD AUTO: 0.02 X10*3/UL (ref 0–0.1)
BASOPHILS NFR BLD AUTO: 0.4 %
BILIRUB SERPL-MCNC: 0.5 MG/DL (ref 0–1.2)
BUN SERPL-MCNC: 31 MG/DL (ref 6–23)
CALCIUM SERPL-MCNC: 10.6 MG/DL (ref 8.6–10.3)
CHLORIDE SERPL-SCNC: 104 MMOL/L (ref 98–107)
CO2 SERPL-SCNC: 24 MMOL/L (ref 21–32)
CREAT SERPL-MCNC: 1.22 MG/DL (ref 0.5–1.05)
EGFRCR SERPLBLD CKD-EPI 2021: 47 ML/MIN/1.73M*2
EOSINOPHIL # BLD AUTO: 0.35 X10*3/UL (ref 0–0.4)
EOSINOPHIL NFR BLD AUTO: 6.4 %
ERYTHROCYTE [DISTWIDTH] IN BLOOD BY AUTOMATED COUNT: 12.5 % (ref 11.5–14.5)
GLUCOSE SERPL-MCNC: 112 MG/DL (ref 74–99)
HCT VFR BLD AUTO: 41.5 % (ref 36–46)
HGB BLD-MCNC: 13.5 G/DL (ref 12–16)
HOLD SPECIMEN: NORMAL
IMM GRANULOCYTES # BLD AUTO: 0 X10*3/UL (ref 0–0.5)
IMM GRANULOCYTES NFR BLD AUTO: 0 % (ref 0–0.9)
LYMPHOCYTES # BLD AUTO: 1.69 X10*3/UL (ref 0.8–3)
LYMPHOCYTES NFR BLD AUTO: 30.9 %
MCH RBC QN AUTO: 32.1 PG (ref 26–34)
MCHC RBC AUTO-ENTMCNC: 32.5 G/DL (ref 32–36)
MCV RBC AUTO: 99 FL (ref 80–100)
MONOCYTES # BLD AUTO: 0.39 X10*3/UL (ref 0.05–0.8)
MONOCYTES NFR BLD AUTO: 7.1 %
NEUTROPHILS # BLD AUTO: 3.02 X10*3/UL (ref 1.6–5.5)
NEUTROPHILS NFR BLD AUTO: 55.2 %
PLATELET # BLD AUTO: 247 X10*3/UL (ref 150–450)
POTASSIUM SERPL-SCNC: 4.2 MMOL/L (ref 3.5–5.3)
PROT SERPL-MCNC: 7.6 G/DL (ref 6.4–8.2)
RBC # BLD AUTO: 4.21 X10*6/UL (ref 4–5.2)
SODIUM SERPL-SCNC: 140 MMOL/L (ref 136–145)
WBC # BLD AUTO: 5.5 X10*3/UL (ref 4.4–11.3)

## 2024-01-15 PROCEDURE — 71260 CT THORAX DX C+: CPT

## 2024-01-15 PROCEDURE — 36415 COLL VENOUS BLD VENIPUNCTURE: CPT

## 2024-01-15 PROCEDURE — 80053 COMPREHEN METABOLIC PANEL: CPT

## 2024-01-15 PROCEDURE — 85025 COMPLETE CBC W/AUTO DIFF WBC: CPT

## 2024-01-15 PROCEDURE — 2550000001 HC RX 255 CONTRASTS: Performed by: NURSE PRACTITIONER

## 2024-01-15 PROCEDURE — 71260 CT THORAX DX C+: CPT | Performed by: RADIOLOGY

## 2024-01-15 RX ADMIN — IOHEXOL 50 ML: 350 INJECTION, SOLUTION INTRAVENOUS at 12:59

## 2024-01-22 ENCOUNTER — APPOINTMENT (OUTPATIENT)
Dept: HEMATOLOGY/ONCOLOGY | Facility: CLINIC | Age: 74
End: 2024-01-22
Payer: COMMERCIAL

## 2024-01-29 ENCOUNTER — OFFICE VISIT (OUTPATIENT)
Dept: PRIMARY CARE | Facility: CLINIC | Age: 74
End: 2024-01-29
Payer: COMMERCIAL

## 2024-01-29 VITALS
OXYGEN SATURATION: 98 % | TEMPERATURE: 97.2 F | BODY MASS INDEX: 27.38 KG/M2 | HEIGHT: 62 IN | DIASTOLIC BLOOD PRESSURE: 68 MMHG | WEIGHT: 148.8 LBS | RESPIRATION RATE: 16 BRPM | HEART RATE: 67 BPM | SYSTOLIC BLOOD PRESSURE: 136 MMHG

## 2024-01-29 DIAGNOSIS — E11.9 CONTROLLED TYPE 2 DIABETES MELLITUS WITHOUT COMPLICATION, WITHOUT LONG-TERM CURRENT USE OF INSULIN (MULTI): ICD-10-CM

## 2024-01-29 DIAGNOSIS — Z00.00 HEALTH CARE MAINTENANCE: ICD-10-CM

## 2024-01-29 DIAGNOSIS — E78.49 OTHER HYPERLIPIDEMIA: ICD-10-CM

## 2024-01-29 DIAGNOSIS — I10 PRIMARY HYPERTENSION: ICD-10-CM

## 2024-01-29 DIAGNOSIS — Z00.00 MEDICARE ANNUAL WELLNESS VISIT, SUBSEQUENT: Primary | ICD-10-CM

## 2024-01-29 DIAGNOSIS — E08.21 DIABETES MELLITUS DUE TO UNDERLYING CONDITION, CONTROLLED, WITH DIABETIC NEPHROPATHY, WITHOUT LONG-TERM CURRENT USE OF INSULIN (MULTI): ICD-10-CM

## 2024-01-29 DIAGNOSIS — N18.31 STAGE 3A CHRONIC KIDNEY DISEASE (MULTI): ICD-10-CM

## 2024-01-29 DIAGNOSIS — C34.11 MALIGNANT NEOPLASM OF UPPER LOBE OF RIGHT LUNG (MULTI): ICD-10-CM

## 2024-01-29 DIAGNOSIS — K59.00 CONSTIPATION, UNSPECIFIED CONSTIPATION TYPE: ICD-10-CM

## 2024-01-29 DIAGNOSIS — E55.9 VITAMIN D DEFICIENCY: ICD-10-CM

## 2024-01-29 DIAGNOSIS — F32.1 DEPRESSION, MAJOR, SINGLE EPISODE, MODERATE (MULTI): ICD-10-CM

## 2024-01-29 DIAGNOSIS — N90.7 SEBACEOUS CYST OF LABIA: ICD-10-CM

## 2024-01-29 PROBLEM — E66.9 CLASS 1 OBESITY WITH BODY MASS INDEX (BMI) OF 32.0 TO 32.9 IN ADULT: Status: RESOLVED | Noted: 2023-02-10 | Resolved: 2024-01-29

## 2024-01-29 PROBLEM — E66.811 CLASS 1 OBESITY WITH BODY MASS INDEX (BMI) OF 32.0 TO 32.9 IN ADULT: Status: RESOLVED | Noted: 2023-02-10 | Resolved: 2024-01-29

## 2024-01-29 PROBLEM — E11.69 DIABETES MELLITUS TYPE 2 IN OBESE: Status: RESOLVED | Noted: 2023-02-10 | Resolved: 2024-01-29

## 2024-01-29 PROBLEM — E66.9 DIABETES MELLITUS TYPE 2 IN OBESE: Status: RESOLVED | Noted: 2023-02-10 | Resolved: 2024-01-29

## 2024-01-29 PROCEDURE — 1170F FXNL STATUS ASSESSED: CPT | Performed by: INTERNAL MEDICINE

## 2024-01-29 PROCEDURE — 3078F DIAST BP <80 MM HG: CPT | Performed by: INTERNAL MEDICINE

## 2024-01-29 PROCEDURE — 3075F SYST BP GE 130 - 139MM HG: CPT | Performed by: INTERNAL MEDICINE

## 2024-01-29 PROCEDURE — 3066F NEPHROPATHY DOC TX: CPT | Performed by: INTERNAL MEDICINE

## 2024-01-29 PROCEDURE — 1123F ACP DISCUSS/DSCN MKR DOCD: CPT | Performed by: INTERNAL MEDICINE

## 2024-01-29 PROCEDURE — 4010F ACE/ARB THERAPY RXD/TAKEN: CPT | Performed by: INTERNAL MEDICINE

## 2024-01-29 PROCEDURE — 1159F MED LIST DOCD IN RCRD: CPT | Performed by: INTERNAL MEDICINE

## 2024-01-29 PROCEDURE — 1160F RVW MEDS BY RX/DR IN RCRD: CPT | Performed by: INTERNAL MEDICINE

## 2024-01-29 PROCEDURE — 1126F AMNT PAIN NOTED NONE PRSNT: CPT | Performed by: INTERNAL MEDICINE

## 2024-01-29 PROCEDURE — 1158F ADVNC CARE PLAN TLK DOCD: CPT | Performed by: INTERNAL MEDICINE

## 2024-01-29 PROCEDURE — 1157F ADVNC CARE PLAN IN RCRD: CPT | Performed by: INTERNAL MEDICINE

## 2024-01-29 PROCEDURE — G0446 INTENS BEHAVE THER CARDIO DX: HCPCS | Performed by: INTERNAL MEDICINE

## 2024-01-29 PROCEDURE — G0444 DEPRESSION SCREEN ANNUAL: HCPCS | Performed by: INTERNAL MEDICINE

## 2024-01-29 PROCEDURE — 99214 OFFICE O/P EST MOD 30 MIN: CPT | Performed by: INTERNAL MEDICINE

## 2024-01-29 PROCEDURE — G0439 PPPS, SUBSEQ VISIT: HCPCS | Performed by: INTERNAL MEDICINE

## 2024-01-29 RX ORDER — ROSUVASTATIN CALCIUM 20 MG/1
20 TABLET, COATED ORAL NIGHTLY
Qty: 90 TABLET | Refills: 3 | Status: SHIPPED | OUTPATIENT
Start: 2024-01-29

## 2024-01-29 ASSESSMENT — ENCOUNTER SYMPTOMS
WHEEZING: 0
DIARRHEA: 0
NAUSEA: 0
SORE THROAT: 0
JOINT SWELLING: 0
ARTHRALGIAS: 0
CHILLS: 0
ADENOPATHY: 0
PALPITATIONS: 0
SLEEP DISTURBANCE: 0
ABDOMINAL PAIN: 0
FATIGUE: 0
WEAKNESS: 0
VOMITING: 0
CHEST TIGHTNESS: 0
CONSTIPATION: 1
COUGH: 0
SHORTNESS OF BREATH: 0
CONFUSION: 0
DIZZINESS: 0
DYSURIA: 0
UNEXPECTED WEIGHT CHANGE: 0

## 2024-01-29 ASSESSMENT — ACTIVITIES OF DAILY LIVING (ADL)
BATHING: INDEPENDENT
TAKING_MEDICATION: INDEPENDENT
GROCERY_SHOPPING: INDEPENDENT
DRESSING: INDEPENDENT
DOING_HOUSEWORK: INDEPENDENT
MANAGING_FINANCES: INDEPENDENT

## 2024-01-29 NOTE — ASSESSMENT & PLAN NOTE
I have discussed the cardiovascular risk and behavioral modification, nutrition, exercise and elimination of habits contributing to risk. We agreed to a plan how to reduce the risk.  CV risk estimate calculates 32.2  Will increase crestor to 20 mg for now

## 2024-01-29 NOTE — PROGRESS NOTES
"Subjective   Reason for Visit: Nan Crane is an 74 y.o. female here for a Medicare Wellness visit.     Past Medical, Surgical, and Family History reviewed and updated in chart.    Reviewed all medications by prescribing practitioner or clinical pharmacist (such as prescriptions, OTCs, herbal therapies and supplements) and documented in the medical record.    Patient is here for her AW, Medicare Annual Subsequent Visit.  Patient is Smokeless  Labs - 8.23.23, 01.15.24  Mammogram Report 12.26.23  Dexa 02.22.23  Colonoscopy 12.13.2021  Patient stated is a little constipated today, and wants to assess two cyst she has in the vagina area.    Seen by alex montoya in December 2023    Patient Care Team:  Irvin Muñoz MD as PCP - General (Internal Medicine)  Irvin Muñoz MD as PCP - [facility] [employer]vantage PCP  Mayra Vines MD as Consulting Physician (Hematology and Oncology)     Review of Systems   Constitutional:  Negative for chills, fatigue and unexpected weight change.        Comment   HENT:  Negative for congestion, ear pain and sore throat.    Respiratory:  Negative for cough, chest tightness, shortness of breath and wheezing.    Cardiovascular:  Negative for palpitations and leg swelling.   Gastrointestinal:  Positive for constipation. Negative for abdominal pain, diarrhea, nausea and vomiting.   Genitourinary:  Negative for dysuria and urgency.   Musculoskeletal:  Negative for arthralgias and joint swelling.   Skin:  Negative for rash.   Neurological:  Negative for dizziness and weakness.   Hematological:  Negative for adenopathy.   Psychiatric/Behavioral:  Negative for confusion and sleep disturbance.    All other systems reviewed and are negative.      Objective   Vitals:  /68 (BP Location: Left arm, Patient Position: Sitting)   Pulse 67   Temp 36.2 °C (97.2 °F)   Resp 16   Ht 1.575 m (5' 2\")   Wt 67.5 kg (148 lb 12.8 oz)   SpO2 98%   BMI 27.22 kg/m²       Physical " Exam  Constitutional:       Appearance: Normal appearance.   HENT:      Head: Normocephalic and atraumatic.   Eyes:      Pupils: Pupils are equal, round, and reactive to light.   Cardiovascular:      Rate and Rhythm: Normal rate and regular rhythm.   Pulmonary:      Effort: Pulmonary effort is normal.      Breath sounds: Normal breath sounds.   Musculoskeletal:         General: Normal range of motion.      Cervical back: Normal range of motion and neck supple.   Skin:     General: Skin is warm.   Neurological:      General: No focal deficit present.      Mental Status: She is alert and oriented to person, place, and time.   Psychiatric:         Mood and Affect: Mood normal.         Behavior: Behavior normal.       Lab Results   Component Value Date    GLUCOSE 112 (H) 01/15/2024    CALCIUM 10.6 (H) 01/15/2024     01/15/2024    K 4.2 01/15/2024    CO2 24 01/15/2024     01/15/2024    BUN 31 (H) 01/15/2024    CREATININE 1.22 (H) 01/15/2024       Lab Results   Component Value Date    WBC 5.5 01/15/2024    HGB 13.5 01/15/2024    HCT 41.5 01/15/2024    MCV 99 01/15/2024     01/15/2024   === 01/15/24 ===    CT CHEST W IV CONTRAST    - Impression -  Status post resection of the right lobe. No obvious recurrent tumor  or metastatic disease.    Signed by: Sofi Gomez 1/16/2024 1:10 PM  Dictation workstation:   [id number]  Lab Results   Component Value Date    HGBA1C 6.5 06/26/2023    HGBA1C 6.5 06/26/2023         Assessment/Plan   Problem List Items Addressed This Visit       CKD (chronic kidney disease) stage 3, GFR 30-59 ml/min (CMS/HCC)    Current Assessment & Plan     Avoid taking nephrotoxic medication , NSAIDS like ibuprofen, naproxen,  increase po fluids intake, have your blood pressure well controlled, fu as advised.           Constipation    Depression, major, single episode, moderate (CMS/HCC)    Relevant Orders    TSH with reflex to Free T4 if abnormal    Vitamin B12    Magnesium    Diabetes  mellitus type II, controlled, with no complications (CMS/HCC)    Relevant Orders    Albumin , Urine Random    HTN (hypertension)    Hyperlipidemia    Current Assessment & Plan     I have discussed the cardiovascular risk and behavioral modification, nutrition, exercise and elimination of habits contributing to risk. We agreed to a plan how to reduce the risk.  CV risk estimate calculates 32.2  Will increase crestor to 20 mg for now           Relevant Orders    Lipid Panel    Hepatitis C Antibody    Malignant neoplasm of upper lobe of right lung (CMS/HCC)    Current Assessment & Plan     Resolved , had recent ct chest, no recurrence         Vitamin D deficiency    Relevant Orders    Vitamin D 25-Hydroxy,Total (for eval of Vitamin D levels)    Controlled diabetes mellitus with diabetic nephropathy (CMS/HCC)    Medicare annual wellness visit, subsequent - Primary    Sebaceous cyst of labia     Other Visit Diagnoses       Health care maintenance        Relevant Medications    rosuvastatin (Crestor) 20 mg tablet

## 2024-01-29 NOTE — PATIENT INSTRUCTIONS
Was nice seeing you today.  Continue same medication.  Have lab work done before next appointment if labs were ordered today.  Fu in 3 month.  Call/ contact our office with any concerns.    If you have labs or test done and you can't see the report in your chart or you didn't here from us in 2 weeks after test/labs done , please, call our office for reports.  Please , do not assume that they were normal.  Take metamucil up to tree times /day, increase po fluids and fibers   Discussed living will and power of  for health care. All question answered and information given to patient.  Will bring in a copy of the Will for health care.

## 2024-01-29 NOTE — ASSESSMENT & PLAN NOTE
Avoid taking nephrotoxic medication , NSAIDS like ibuprofen, naproxen,  increase po fluids intake, have your blood pressure well controlled, fu as advised.

## 2024-02-05 ENCOUNTER — OFFICE VISIT (OUTPATIENT)
Dept: ENDOCRINOLOGY | Facility: CLINIC | Age: 74
End: 2024-02-05
Payer: COMMERCIAL

## 2024-02-05 VITALS
BODY MASS INDEX: 26.31 KG/M2 | DIASTOLIC BLOOD PRESSURE: 81 MMHG | WEIGHT: 143 LBS | SYSTOLIC BLOOD PRESSURE: 139 MMHG | HEIGHT: 62 IN

## 2024-02-05 DIAGNOSIS — E78.5 HYPERLIPIDEMIA, UNSPECIFIED HYPERLIPIDEMIA TYPE: ICD-10-CM

## 2024-02-05 DIAGNOSIS — I10 PRIMARY HYPERTENSION: Primary | ICD-10-CM

## 2024-02-05 DIAGNOSIS — E11.9 CONTROLLED TYPE 2 DIABETES MELLITUS WITHOUT COMPLICATION, WITHOUT LONG-TERM CURRENT USE OF INSULIN (MULTI): ICD-10-CM

## 2024-02-05 LAB
POC FINGERSTICK BLOOD GLUCOSE: 121 MG/DL (ref 70–100)
POC HEMOGLOBIN A1C: 6.3 % (ref 4.2–6.5)

## 2024-02-05 PROCEDURE — 1157F ADVNC CARE PLAN IN RCRD: CPT | Performed by: STUDENT IN AN ORGANIZED HEALTH CARE EDUCATION/TRAINING PROGRAM

## 2024-02-05 PROCEDURE — 3079F DIAST BP 80-89 MM HG: CPT | Performed by: STUDENT IN AN ORGANIZED HEALTH CARE EDUCATION/TRAINING PROGRAM

## 2024-02-05 PROCEDURE — 99214 OFFICE O/P EST MOD 30 MIN: CPT | Performed by: STUDENT IN AN ORGANIZED HEALTH CARE EDUCATION/TRAINING PROGRAM

## 2024-02-05 PROCEDURE — 1159F MED LIST DOCD IN RCRD: CPT | Performed by: STUDENT IN AN ORGANIZED HEALTH CARE EDUCATION/TRAINING PROGRAM

## 2024-02-05 PROCEDURE — 3066F NEPHROPATHY DOC TX: CPT | Performed by: STUDENT IN AN ORGANIZED HEALTH CARE EDUCATION/TRAINING PROGRAM

## 2024-02-05 PROCEDURE — 82962 GLUCOSE BLOOD TEST: CPT | Performed by: STUDENT IN AN ORGANIZED HEALTH CARE EDUCATION/TRAINING PROGRAM

## 2024-02-05 PROCEDURE — 3075F SYST BP GE 130 - 139MM HG: CPT | Performed by: STUDENT IN AN ORGANIZED HEALTH CARE EDUCATION/TRAINING PROGRAM

## 2024-02-05 PROCEDURE — 4010F ACE/ARB THERAPY RXD/TAKEN: CPT | Performed by: STUDENT IN AN ORGANIZED HEALTH CARE EDUCATION/TRAINING PROGRAM

## 2024-02-05 PROCEDURE — 83036 HEMOGLOBIN GLYCOSYLATED A1C: CPT | Performed by: STUDENT IN AN ORGANIZED HEALTH CARE EDUCATION/TRAINING PROGRAM

## 2024-02-05 PROCEDURE — 1160F RVW MEDS BY RX/DR IN RCRD: CPT | Performed by: STUDENT IN AN ORGANIZED HEALTH CARE EDUCATION/TRAINING PROGRAM

## 2024-02-05 PROCEDURE — 95251 CONT GLUC MNTR ANALYSIS I&R: CPT | Performed by: STUDENT IN AN ORGANIZED HEALTH CARE EDUCATION/TRAINING PROGRAM

## 2024-02-05 PROCEDURE — 1126F AMNT PAIN NOTED NONE PRSNT: CPT | Performed by: STUDENT IN AN ORGANIZED HEALTH CARE EDUCATION/TRAINING PROGRAM

## 2024-02-05 NOTE — PROGRESS NOTES
"Subjective   Patient ID: Nan Crane is a 74 y.o. female who presents for Diabetes (Dx dm: 10+ years /PCP: [facility] /Podiatry: does not see one /Eye exam: yearly; 12/23/Patient testing glucose 4 times daily with freestyle nory reader (original). Due to fluctuating blood glucose and hypoglycemia /Would like to discuss truclicity to change to ozempic; has assistance paperwork for jardiance and ozempic ).  Lab Results   Component Value Date    HGBA1C 6.3 02/05/2024      HPI  The patient is a known diabetic for 10 years presents for follow up  In office hba1c is 6.4%     freestyle nory downloads reviewed   target 95%   high 2 %   low 3 %  Very low 1 % , not accjurate happen while asleep and correct with no food      current regimen   metformin 1 gm BID   off Glipizide  Jardiance 25 mg    Trulclity 0.75 mg weekly , was Byduren 2 mg ( has difficulty opening and inject  Review of Systems   HENT:  Positive for congestion.        Objective   Physical Exam  Constitutional:       Appearance: Normal appearance.   Cardiovascular:      Rate and Rhythm: Normal rate and regular rhythm.   Pulmonary:      Effort: Pulmonary effort is normal.      Breath sounds: Normal breath sounds.   Neurological:      Mental Status: She is alert.      Visit Vitals  /81   Ht 1.575 m (5' 2\")   Wt 64.9 kg (143 lb)   BMI 26.16 kg/m²   OB Status Postmenopausal   Smoking Status Never   BSA 1.69 m²        Assessment/Plan         - Well controlled DM2 with hba1c of 6.6 , on oral agents and GLP1  - DM nephropathy with urine albumin/creat 205 on ARB and SGLT2  - HTN controlled on chlorthalidone , Noravsc and ARB  - HLD controlled LDL 65 ,on Rosuvastatin 10 mg   - BPV , no recent episodes.  - Newly diagnosed Carcinoid neuroendocrine tumor s/p lobectomy      plan :  - Continue Trulclity 0.75 mg ( Had difficulty using Byduren pen).   -continue metformin and Jardiance at current dose.   - Advised to adhere to a Diabetic low carb diet.    RTC in 4 motnhs    "

## 2024-02-08 ENCOUNTER — OFFICE VISIT (OUTPATIENT)
Dept: HEMATOLOGY/ONCOLOGY | Facility: CLINIC | Age: 74
End: 2024-02-08
Payer: COMMERCIAL

## 2024-02-08 VITALS
DIASTOLIC BLOOD PRESSURE: 78 MMHG | BODY MASS INDEX: 26.73 KG/M2 | RESPIRATION RATE: 18 BRPM | WEIGHT: 146.16 LBS | OXYGEN SATURATION: 96 % | HEART RATE: 106 BPM | TEMPERATURE: 98.2 F | SYSTOLIC BLOOD PRESSURE: 126 MMHG

## 2024-02-08 DIAGNOSIS — C34.11 MALIGNANT NEOPLASM OF UPPER LOBE OF RIGHT LUNG (MULTI): Primary | ICD-10-CM

## 2024-02-08 PROCEDURE — 1160F RVW MEDS BY RX/DR IN RCRD: CPT | Performed by: INTERNAL MEDICINE

## 2024-02-08 PROCEDURE — 99215 OFFICE O/P EST HI 40 MIN: CPT | Performed by: INTERNAL MEDICINE

## 2024-02-08 PROCEDURE — 1157F ADVNC CARE PLAN IN RCRD: CPT | Performed by: INTERNAL MEDICINE

## 2024-02-08 PROCEDURE — 1126F AMNT PAIN NOTED NONE PRSNT: CPT | Performed by: INTERNAL MEDICINE

## 2024-02-08 PROCEDURE — 4010F ACE/ARB THERAPY RXD/TAKEN: CPT | Performed by: INTERNAL MEDICINE

## 2024-02-08 PROCEDURE — 3074F SYST BP LT 130 MM HG: CPT | Performed by: INTERNAL MEDICINE

## 2024-02-08 PROCEDURE — 3078F DIAST BP <80 MM HG: CPT | Performed by: INTERNAL MEDICINE

## 2024-02-08 PROCEDURE — 1159F MED LIST DOCD IN RCRD: CPT | Performed by: INTERNAL MEDICINE

## 2024-02-08 ASSESSMENT — PAIN SCALES - GENERAL: PAINLEVEL: 0-NO PAIN

## 2024-02-08 NOTE — PROGRESS NOTES
Patient ID: Nan Crane is a 74 y.o. female.  Referring Physician: No referring provider defined for this encounter.  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 The patient was referred by Dr. Aicha Hamlin.    02/15/2022: CT Chest showed a previous 1.6 x 1.8 x 0.6 cm ground-glass nodule in the right upper lobe, predominantly solid and appears slightly larger in size.   03/17/2022: PET/CT (FDG) which revealed a mildly hypermetabolic right upper lobe pulmonary nodule (similar in size compared to prior measuring 0.9 x 0.8 cm), consistent with pulmonary primary neoplasm versus benign etiology.   04/28/2022: Pt underwent a right thoracoscopic upper lobe wedge with lobectomy and mediastinal lymph node dissection which revealed an atypical carcinoid neuroendocrine tumor (Grade 2, T1a N2, and Ki67 at 15%) of the right upper lung with metastatic neuroendocrine  tumor at 10R and 4R lymph nodes (2/5).     06/09/2022: Pt had a Chromogranin A level at 93075 ng/mL (reference < 311) and an elevated 5-HIAA (plasma) at 3 ng/mL   07/07/2022: Pt had a high Chromogranin A level at 19662 ng/mL (reference < 311) and an elevated 5-HIAA (plasma) at 26 ng/mL    07/13/2022: PET/CT (Cu-64) s/p right upper lobe wedge resection which revealed Cu-64 dotatate avid focus involves C3 posterior elements, highly concerning for bone metastasis until proven otherwise.   07/18/2022: Pt underwent MRI of cervical spine which showed slightly irregular contour of the posterior elements/spinous processes of C3 and C4, with a subtle focus of centrally nonenhancing, peripherally enhancing focus of dark T1 and T2 signal present  within the posterior epidural space at the level of C3-C4, corresponding to focus  of radiotracer uptake described on previous PET-CT dated 07/13/2022, without significant surrounding soft tissue edema or definitive evidence of marrow signal changes. This  is favored to possibly represent sequela of prior trauma or degenerative changes, less likely metastasis.    Repeated C-spine MRI on 09/12/2022 with increased enhancement in C4 lesion.   10/06/2022: PET/CT (Cu-64) which revealed stable postsurgical changes of right upper lobectomy, as well as mild interval decrease in degree of focal Cu 64 dotatate uptake involving the right lamina of C3. Findings are nonspecific however, continued attention  on follow-up is advised.   Today: She is feeling well, no abdominal pain, no nausea or vomiting. Normal BMs. No new symptoms          Subjective    Patient is feeling well. Denies abdominal pain, nausea or vomiting. No new symptoms.        Objective   BSA: 1.7 meters squared  /78 (BP Location: Left arm, Patient Position: Sitting)   Pulse 106   Temp 36.8 °C (98.2 °F) (Temporal)   Resp 18   Wt 66.3 kg (146 lb 2.6 oz)   SpO2 96%   BMI 26.73 kg/m²       Nan Crane  reports that she has never smoked. She uses smokeless tobacco.  She  reports that she does not currently use alcohol.  She  reports no history of drug use.    ROS:  All 14 systems have been reviewed and were negative except for the HPI.     Physical Exam:  General: Appears well and in NAD  Eyes: PERRL, EOMI, clear sclera  ENMT: mucous membranes moist, no apparent injury, no lesions seen  Head/Neck: Neck supple, no apparent injury, thyroid without mass or tenderness, No JVD, trachea midline,   Thorax: Patent airways, CTAB, normal breath sounds with good chest expansion, thorax symmetric  Cardiovascular: Regular, rate and rhythm, no murmurs, 2+ equal pulses of the extremities, normal S 1and S 2  Gastrointestinal: Nondistended, soft, non-tender, no rebound tenderness or guarding, no masses palpable, no organomegaly, +BS  Musculoskeletal:  ROM intact, no joint swelling, normal strength  Extremities: normal extremities, no cyanosis edema, contusions or wounds, no clubbing  Neurological: alert and oriented x3, intact senses, motor, response and reflexes, normal strength  Lymphatic: No significant lymphadenopathy  Psychological: Appropriate mood and behavior  Skin: Warm and dry, no lesions, no rashes     Performance Status:  Asymptomatic    Lab Results:  I have reviewed these laboratory results:     Lab Results   Component Value Date    WBC 5.5 01/15/2024    HGB 13.5 01/15/2024    HCT 41.5 01/15/2024    MCV 99 01/15/2024     01/15/2024         Chemistry    Lab Results   Component Value Date/Time     01/15/2024 1143    K 4.2 01/15/2024 1143     01/15/2024 1143    CO2 24 01/15/2024 1143    BUN 31 (H) 01/15/2024 1143    CREATININE 1.22 (H) 01/15/2024 1143    Lab Results   Component Value Date/Time    CALCIUM 10.6 (H) 01/15/2024 1143    ALKPHOS 60 01/15/2024 1143    AST 15 01/15/2024 1143    ALT 15 01/15/2024 1143    BILITOT 0.5 01/15/2024 1143            Lab Results   Component Value Date    TSH 2.29 11/10/2022        Radiology Result:  I have reviewed the latest Imaging in PACS and the findings are noted in this note. I discussed the results of the latest imaging with the patient. All previous imaging were reviewed at the time it was completed. Full records are available in the EMR for review as well.     === 01/15/24 ===    CT CHEST W IV CONTRAST    - Impression -  Status post resection of the right lobe. No obvious recurrent tumor  or metastatic disease.    Signed by: Sofi Gomez 1/16/2024 1:10 PM  Dictation workstation:   [id number]    === 09/12/22 ===    MR CERVICAL SPINE W AND WO CONTRAST    - Impression -  1. The C4 spinous process has a focus of a edema and enhancement more  prominent/circumscribed than noted on the prior exam. There is edema  with some enhancement in the adjacent soft tissues similar to the  prior exam. The  findings could represent underlying tumor in the  spinous process. Trauma is not completely excluded.    2. Degenerative changes elsewhere as noted similar to the prior exam.    CT chest w IV contrast    Result Date: 1/16/2024  Impression: Status post resection of the right lobe. No obvious recurrent tumor or metastatic disease.   Signed by: Sofi Gomez 1/16/2024 1:10 PM Dictation workstation:   [id number]        Pathology Results:  I have reviewed the full pathology report recorded in the EMR. The pertinent portions indicating diagnosis are listed here in the note. for details please refer to the full report recorded in the EMR.    Assessment and Plan:     Assessment and Plan:   Assessment:    74-year-old female presenting with atypical carcinoid Stage IIIA neuroendocrine tumor (Grade 2, [id number], Ki67 15%) of lung s/p RUL lobectomy on 04/28/2022  with negative margins. The patient was referred by Dr. Aicha Hamlin.      10/06/2022: PET/CT (Cu-64) which revealed stable postsurgical changes of right upper lobectomy, as well as mild interval decrease in degree of focal Cu 64 dotatate uptake involving the right  lamina of C3. Findings are nonspecific however, continued attention on follow-up is advised. Pt had cervical MRI which showed same area looks due to degenerative changes.     03/2023: Restaging scans excluded recurrent or metastatic disease and there is only Soft tissue density along the wedge resection lateral to the upper mediastinum not present  on previous study    01/2024: Restaging scans excluded any recurrent or metastatic disease      Plan:    1- CT Chest in 12 mnonth                 Mr. Nan RICHEY May ,  It was a pleasure talking to you today.    Our offices will be reaching out to you to make all the appointments. I am always available at the phone numbers listed below for an earlier appointment should you wish one.    In case of an emergency please dial 911 or report to your nearest Emergency  Room.  For all other questions, please do not hesitate to reach out to us at the number listed below.    Thank you for choosing [facility] at [facility].   We appreciate your visit.       Mayra Vines M.D.   and Director of the Neuroendocrine Tumor Program  Gastrointestinal Medical Oncology  Department of Hematology and Oncology  [facility], [place] [address]  Phone (Office): [phone]  Fax:  [phone]   Email: [email or web address]  Learn more about [facility] Comprehensive Neuroendocrine Tumor Program: Click Here  Learn more about [place] [employer]: [email or web address]

## 2024-02-28 DIAGNOSIS — Z00.00 HEALTH CARE MAINTENANCE: ICD-10-CM

## 2024-02-28 RX ORDER — SPIRONOLACTONE 25 MG/1
25 TABLET ORAL DAILY
Qty: 90 TABLET | Refills: 0 | Status: SHIPPED | OUTPATIENT
Start: 2024-02-28 | End: 2024-06-06 | Stop reason: SDUPTHER

## 2024-03-14 DIAGNOSIS — R25.1 TREMOR OF BOTH HANDS: ICD-10-CM

## 2024-03-14 DIAGNOSIS — F32.1 DEPRESSION, MAJOR, SINGLE EPISODE, MODERATE (MULTI): Primary | ICD-10-CM

## 2024-03-15 RX ORDER — BUPROPION HYDROCHLORIDE 200 MG/1
TABLET, EXTENDED RELEASE ORAL
Qty: 180 TABLET | Refills: 1 | Status: SHIPPED | OUTPATIENT
Start: 2024-03-15

## 2024-03-18 DIAGNOSIS — J30.1 SEASONAL ALLERGIC RHINITIS DUE TO POLLEN: Primary | ICD-10-CM

## 2024-03-19 RX ORDER — FLUTICASONE PROPIONATE 50 MCG
1 SPRAY, SUSPENSION (ML) NASAL AS NEEDED
Qty: 50 ML | Refills: 0 | Status: SHIPPED | OUTPATIENT
Start: 2024-03-19 | End: 2024-04-17 | Stop reason: SDUPTHER

## 2024-03-20 ENCOUNTER — APPOINTMENT (OUTPATIENT)
Dept: RADIOLOGY | Facility: HOSPITAL | Age: 74
End: 2024-03-20
Payer: COMMERCIAL

## 2024-03-20 ENCOUNTER — HOSPITAL ENCOUNTER (EMERGENCY)
Facility: HOSPITAL | Age: 74
Discharge: HOME | End: 2024-03-20
Attending: EMERGENCY MEDICINE
Payer: COMMERCIAL

## 2024-03-20 VITALS
BODY MASS INDEX: 26.31 KG/M2 | SYSTOLIC BLOOD PRESSURE: 177 MMHG | HEIGHT: 62 IN | TEMPERATURE: 98.2 F | OXYGEN SATURATION: 98 % | DIASTOLIC BLOOD PRESSURE: 84 MMHG | WEIGHT: 143 LBS | RESPIRATION RATE: 18 BRPM | HEART RATE: 96 BPM

## 2024-03-20 DIAGNOSIS — S01.81XA FACIAL LACERATION, INITIAL ENCOUNTER: ICD-10-CM

## 2024-03-20 DIAGNOSIS — S09.90XA CLOSED HEAD INJURY, INITIAL ENCOUNTER: Primary | ICD-10-CM
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
## 2024-03-20 DIAGNOSIS — S02.2XXB OPEN FRACTURE OF NASAL BONE, INITIAL ENCOUNTER: ICD-10-CM

## 2024-03-20 PROCEDURE — 76377 3D RENDER W/INTRP POSTPROCES: CPT

## 2024-03-20 PROCEDURE — 70450 CT HEAD/BRAIN W/O DYE: CPT | Performed by: RADIOLOGY

## 2024-03-20 PROCEDURE — 73030 X-RAY EXAM OF SHOULDER: CPT | Mod: RT,FY

## 2024-03-20 PROCEDURE — 2500000001 HC RX 250 WO HCPCS SELF ADMINISTERED DRUGS (ALT 637 FOR MEDICARE OP)

## 2024-03-20 PROCEDURE — 2500000005 HC RX 250 GENERAL PHARMACY W/O HCPCS: Performed by: STUDENT IN AN ORGANIZED HEALTH CARE EDUCATION/TRAINING PROGRAM

## 2024-03-20 PROCEDURE — 72125 CT NECK SPINE W/O DYE: CPT | Performed by: RADIOLOGY

## 2024-03-20 PROCEDURE — 76377 3D RENDER W/INTRP POSTPROCES: CPT | Performed by: RADIOLOGY

## 2024-03-20 PROCEDURE — 12011 RPR F/E/E/N/L/M 2.5 CM/<: CPT | Performed by: EMERGENCY MEDICINE

## 2024-03-20 PROCEDURE — 12011 RPR F/E/E/N/L/M 2.5 CM/<: CPT | Performed by: STUDENT IN AN ORGANIZED HEALTH CARE EDUCATION/TRAINING PROGRAM

## 2024-03-20 PROCEDURE — 70486 CT MAXILLOFACIAL W/O DYE: CPT | Performed by: RADIOLOGY

## 2024-03-20 PROCEDURE — 72125 CT NECK SPINE W/O DYE: CPT

## 2024-03-20 PROCEDURE — 2500000001 HC RX 250 WO HCPCS SELF ADMINISTERED DRUGS (ALT 637 FOR MEDICARE OP): Performed by: STUDENT IN AN ORGANIZED HEALTH CARE EDUCATION/TRAINING PROGRAM

## 2024-03-20 PROCEDURE — 70486 CT MAXILLOFACIAL W/O DYE: CPT

## 2024-03-20 PROCEDURE — 99285 EMERGENCY DEPT VISIT HI MDM: CPT | Mod: 25 | Performed by: EMERGENCY MEDICINE

## 2024-03-20 PROCEDURE — 70450 CT HEAD/BRAIN W/O DYE: CPT

## 2024-03-20 PROCEDURE — 99284 EMERGENCY DEPT VISIT MOD MDM: CPT | Performed by: EMERGENCY MEDICINE

## 2024-03-20 PROCEDURE — 73030 X-RAY EXAM OF SHOULDER: CPT | Mod: RIGHT SIDE | Performed by: RADIOLOGY

## 2024-03-20 RX ORDER — IBUPROFEN 600 MG/1
600 TABLET ORAL ONCE
Status: COMPLETED | OUTPATIENT
Start: 2024-03-20 | End: 2024-03-20

## 2024-03-20 RX ORDER — BACITRACIN ZINC 500 UNIT/G
1 OINTMENT IN PACKET (EA) TOPICAL ONCE
Status: COMPLETED | OUTPATIENT
Start: 2024-03-20 | End: 2024-03-20

## 2024-03-20 RX ORDER — BACITRACIN ZINC 500 UNIT/G
OINTMENT IN PACKET (EA) TOPICAL
Status: COMPLETED
Start: 2024-03-20 | End: 2024-03-20

## 2024-03-20 RX ORDER — LIDOCAINE HYDROCHLORIDE 10 MG/ML
5 INJECTION, SOLUTION EPIDURAL; INFILTRATION; INTRACAUDAL; PERINEURAL ONCE
Status: COMPLETED | OUTPATIENT
Start: 2024-03-20 | End: 2024-03-20

## 2024-03-20 RX ADMIN — BACITRACIN 1 APPLICATION: 500 OINTMENT TOPICAL at 20:45

## 2024-03-20 RX ADMIN — Medication 1 APPLICATION: at 20:45

## 2024-03-20 RX ADMIN — IBUPROFEN 600 MG: 600 TABLET, FILM COATED ORAL at 19:35

## 2024-03-20 RX ADMIN — LIDOCAINE HYDROCHLORIDE 50 MG: 10 INJECTION, SOLUTION EPIDURAL; INFILTRATION; INTRACAUDAL; PERINEURAL at 19:35

## 2024-03-20 ASSESSMENT — LIFESTYLE VARIABLES
HAVE PEOPLE ANNOYED YOU BY CRITICIZING YOUR DRINKING: NO
HAVE YOU EVER FELT YOU SHOULD CUT DOWN ON YOUR DRINKING: NO
EVER HAD A DRINK FIRST THING IN THE MORNING TO STEADY YOUR NERVES TO GET RID OF A HANGOVER: NO
EVER FELT BAD OR GUILTY ABOUT YOUR DRINKING: NO

## 2024-03-20 ASSESSMENT — PAIN DESCRIPTION - ORIENTATION: ORIENTATION: RIGHT

## 2024-03-20 ASSESSMENT — PAIN - FUNCTIONAL ASSESSMENT
PAIN_FUNCTIONAL_ASSESSMENT: 0-10

## 2024-03-20 ASSESSMENT — PAIN DESCRIPTION - LOCATION: LOCATION: SHOULDER

## 2024-03-20 ASSESSMENT — PAIN SCALES - GENERAL
PAINLEVEL_OUTOF10: 6
PAINLEVEL_OUTOF10: 8
PAINLEVEL_OUTOF10: 8

## 2024-03-20 ASSESSMENT — PAIN DESCRIPTION - FREQUENCY: FREQUENCY: CONSTANT/CONTINUOUS

## 2024-03-20 ASSESSMENT — PAIN DESCRIPTION - PROGRESSION: CLINICAL_PROGRESSION: NOT CHANGED

## 2024-03-20 ASSESSMENT — PAIN DESCRIPTION - PAIN TYPE: TYPE: ACUTE PAIN

## 2024-03-20 ASSESSMENT — COLUMBIA-SUICIDE SEVERITY RATING SCALE - C-SSRS
6. HAVE YOU EVER DONE ANYTHING, STARTED TO DO ANYTHING, OR PREPARED TO DO ANYTHING TO END YOUR LIFE?: NO
2. HAVE YOU ACTUALLY HAD ANY THOUGHTS OF KILLING YOURSELF?: NO
1. IN THE PAST MONTH, HAVE YOU WISHED YOU WERE DEAD OR WISHED YOU COULD GO TO SLEEP AND NOT WAKE UP?: NO

## 2024-03-20 ASSESSMENT — PAIN DESCRIPTION - DESCRIPTORS: DESCRIPTORS: ACHING

## 2024-03-20 ASSESSMENT — PAIN DESCRIPTION - ONSET: ONSET: ONGOING

## 2024-03-20 NOTE — ED PROVIDER NOTES
EMERGENCY DEPARTMENT ENCOUNTER      Pt Name: Nan Crane  MRN: [id number]  Birthdate 1950  Date of evaluation: 3/20/2024  Provider: Clementina Whyte DO    CHIEF COMPLAINT       Chief Complaint   Patient presents with    Fall     Trip and fall hit head, denies loc, denies blood thinners, abrasion to forehead, c/o headache, swollen lip and cut to upper lip    Shoulder Injury     Right shoulder pain/injury         HISTORY OF PRESENT ILLNESS    74-year-old female who presents to the emergency department after mechanical ground-level fall.  States that she was out for a walk in her neighborhood, was going downhill, when a gato of wind caught her and thrust her forward, she fell and struck her face on the pavement.  No loss of consciousness, no nausea, no vomiting, no vision changes.  Additionally complaining of a cut inside of her lip, some right-sided shoulder pain, and nosebleed.  Bleeding is well-controlled at this time          Nursing Notes were reviewed.    PAST MEDICAL HISTORY     Past Medical History:   Diagnosis Date    Adjustment disorder with anxiety 04/24/2017    Acute adjustment disorder with anxiety    Encounter for general adult medical examination without abnormal findings 10/09/2019    Annual physical exam    Glaucoma suspect     Gout, unspecified 07/17/2014    Acute gout    Hyperlipidemia, unspecified 08/22/2022    Hyperlipidemia    Hypokalemia 12/11/2019    Hypokalemia    Other conditions influencing health status     Nephrolithiasis Of The Left Kidney    Other conditions influencing health status     Screening for malignant neoplasms, colon    Personal history of diseases of the skin and subcutaneous tissue 07/16/2015    History of contact dermatitis    Personal history of diseases of the skin and subcutaneous tissue 05/16/2013    History of local infection of skin and subcutaneous tissue    Personal history of other diseases of the circulatory system     History of hypertension    Personal history  of other diseases of the digestive system 02/24/2015    History of gastroenteritis    Personal history of other diseases of the digestive system     History of esophageal reflux    Personal history of other diseases of the musculoskeletal system and connective tissue 07/17/2014    History of low back pain    Personal history of other endocrine, nutritional and metabolic disease 01/14/2013    History of diabetes mellitus    Personal history of other infectious and parasitic diseases     History of herpes zoster    Plantar fascial fibromatosis 09/18/2016    Plantar fasciitis    Type 2 diabetes mellitus with other specified complication (CMS/HCC) 11/17/2022    Diabetes mellitus type 2 in obese    Type 2 diabetes mellitus with other specified complication (CMS/HCC) 11/17/2022    Diabetes mellitus type 2 in obese    Type 2 diabetes mellitus with other specified complication (CMS/HCC) 11/17/2022    Diabetes mellitus type 2 in obese    Type 2 diabetes mellitus with other specified complication (CMS/HCC) 11/17/2022    Diabetes mellitus type 2 in obese    Type 2 diabetes mellitus with other specified complication (CMS/HCC) 11/17/2022    Diabetes mellitus type 2 in obese    Type 2 diabetes mellitus with other specified complication (CMS/HCC) 11/17/2022    Diabetes mellitus type 2 in obese    Type 2 diabetes mellitus with other specified complication (CMS/HCC) 11/17/2022    Diabetes mellitus type 2 in obese    Type 2 diabetes mellitus with other specified complication (CMS/HCC) 11/17/2022    Diabetes mellitus type 2 in obese    Type 2 diabetes mellitus with other specified complication (CMS/HCC) 11/17/2022    Diabetes mellitus type 2 in obese    Type 2 diabetes mellitus with other specified complication (CMS/HCC) 11/17/2022    Diabetes mellitus type 2 in obese    Type 2 diabetes mellitus with other specified complication (CMS/HCC) 11/17/2022    Diabetes mellitus type 2 in obese    Type 2 diabetes mellitus with other specified  complication (CMS/HCC) 2022    Diabetes mellitus type 2 in obese    Type 2 diabetes mellitus with other specified complication (CMS/HCC) 2022    Diabetes mellitus type 2 in obese    Type 2 diabetes mellitus with other specified complication (CMS/HCC) 2022    Diabetes mellitus type 2 in obese    Type 2 diabetes mellitus with other specified complication (CMS/HCC) 2022    Diabetes mellitus type 2 in obese    Type 2 diabetes mellitus with other specified complication (CMS/HCC) 2022    Diabetes mellitus type 2 in obese    Type 2 diabetes mellitus with other specified complication (CMS/HCC) 2022    Diabetes mellitus type 2 in obese    Type 2 diabetes mellitus with other specified complication (CMS/HCC) 2022    Diabetes mellitus type 2 in obese         SURGICAL HISTORY       Past Surgical History:   Procedure Laterality Date    CATARACT EXTRACTION Bilateral     LASIK Bilateral     OTHER SURGICAL HISTORY  2012    Electrocardiogram    OTHER SURGICAL HISTORY  10/09/2019    Knee surgery    OTHER SURGICAL HISTORY  10/09/2019    Shoulder surgery    OTHER SURGICAL HISTORY  11/15/2019     section    OTHER SURGICAL HISTORY  2016    Adult Caregiver Appeared To Understand Counseling    OTHER SURGICAL HISTORY  2022    Lung surgery    OTHER SURGICAL HISTORY  2019    Corneal lasik    OTHER SURGICAL HISTORY  2019    Cataract surgery         CURRENT MEDICATIONS       Previous Medications    ASPIRIN 81 MG EC TABLET    Aspirin EC 81 MG Oral Tablet Delayed Release   Refills: 0        Start : 3-Sep-2019   Active    BLOOD SUGAR DIAGNOSTIC (ACCU-CHEK GUIDE TEST STRIPS) STRIP    1 strip 1 (one) time each day.    BUPROPION SR (WELLBUTRIN SR) 200 MG 12 HR TABLET    TAKE 1 TABLET TWICE A DAY. DO NOT CRUSH, CHEW, OR     SPLIT    CALCIUM CARBONATE-VITAMIN D3 600 MG-5 MCG (200 UNIT) TABLET    Calcium 600-200 MG-UNIT TABS   Refills: 0        Start : 3-Sep-2019   Active     CHLORTHALIDONE (HYGROTON) 25 MG TABLET    Take 1 tablet (25 mg) by mouth once daily.    CHOLECALCIFEROL (VITAMIN D-3) 50 MCG (2,000 UNIT) CAPSULE    Vitamin D3 50 MCG (2000 UT) Oral Capsule   Refills: 0        Start : 3-Sep-2019   Active    COENZYME Q-10 200 MG CAPSULE    CoQ10 200 MG Oral Capsule   Refills: 0        Start : 3-Sep-2019   Active    CYANOCOBALAMIN, VITAMIN B-12, (VITAMIN B-12) 1,000 MCG TABLET EXTENDED RELEASE    Take 1 tablet (1,000 mcg) by mouth once daily.    DICLOFENAC SODIUM (VOLTAREN) 1 % GEL GEL    Apply 1 Application topically 4 times a day.    DULAGLUTIDE (TRULICITY) 0.75 MG/0.5 ML PEN INJECTOR    Inject 0.75 mg under the skin 1 (one) time per week.    EMPAGLIFLOZIN (JARDIANCE) 25 MG    Take 1 tablet (25 mg) by mouth once daily.    FLUTICASONE (FLONASE) 50 MCG/ACTUATION NASAL SPRAY    Administer 1 spray into each nostril if needed (As needed for allergies).    FREESTYLE HERBIE 14 DAY SENSOR KIT    Change sensor every 14 days    LANCETS MISC    Accu-Chek FastClix Lancets; Use one daily    LORATADINE (CLARITIN ORAL)        LOSARTAN (COZAAR) 100 MG TABLET    Take 1 tablet (100 mg) by mouth once daily.    MAGNESIUM GLUCONATE (MAGONATE) 27.5 MG MAGNE- SIUM (500 MG) TABLET    Magnesium 500 MG TABS   Refills: 0        Start : 3-Sep-2019   Active    METFORMIN (GLUCOPHAGE) 1,000 MG TABLET    Take 1 tablet (1,000 mg) by mouth every 12 hours.    OMEPRAZOLE (PRILOSEC) 20 MG DR CAPSULE    Take 1 capsule (20 mg) by mouth once daily.    OXYBUTYNIN XL (DITROPAN-XL) 15 MG 24 HR TABLET    Take 1 tablet (15 mg) by mouth once daily. Do not crush, chew, or split.    ROSUVASTATIN (CRESTOR) 20 MG TABLET    Take 1 tablet (20 mg) by mouth once daily at bedtime.    SPIRONOLACTONE (ALDACTONE) 25 MG TABLET    TAKE 1 TABLET ONCE DAILY       ALLERGIES     Albuterol, Azatadine, Buspirone, Codeine, Other, Quinapril, Amoxicillin, Azatadine-pseudoephedrine, Doxycycline, Erythromycin, Sulfa (sulfonamide antibiotics), and  Sulfamethoxazole-trimethoprim    FAMILY HISTORY       Family History   Problem Relation Name Age of Onset    Other (Malignant Neoplasm Of Ovary) Mother      Diabetes Father      Other (Malignat Neoplasm) Father      Other (Gram Positive Sepsis) Brother      Diabetes Other Grandfather           SOCIAL HISTORY       Social History     Socioeconomic History    Marital status:      Spouse name: None    Number of children: None    Years of education: None    Highest education level: None   Occupational History    None   Tobacco Use    Smoking status: Never    Smokeless tobacco: Current   Vaping Use    Vaping Use: Never used   Substance and Sexual Activity    Alcohol use: Not Currently    Drug use: Never    Sexual activity: None   Other Topics Concern    None   Social History Narrative    None     Social Determinants of Health     Financial Resource Strain: Not on file   Food Insecurity: Not on file   Transportation Needs: Not on file   Physical Activity: Not on file   Stress: Not on file   Social Connections: Not on file   Intimate Partner Violence: Not on file   Housing Stability: Not on file       SCREENINGS                        PHYSICAL EXAM    (up to 7 for level 4, 8 or more for level 5)     ED Triage Vitals [03/20/24 1734]   Temperature Heart Rate Respirations BP   36.8 °C (98.2 °F) 95 18 144/75      Pulse Ox Temp Source Heart Rate Source Patient Position   98 % Temporal Monitor Lying      BP Location FiO2 (%)     Left arm --       Physical Exam  Constitutional:       Appearance: She is obese. She is not ill-appearing.   HENT:      Head:      Jaw: No tenderness or malocclusion.      Comments: Right forehead cephalhematoma, asymmetry of the nasal bridge with small overlying stellate laceration, no nasal septal hematoma, midface is stable, horizontal laceration of upper lip with bleeding well-controlled no hemotympanum     Right Ear: Tympanic membrane normal. No hemotympanum.      Left Ear: Tympanic membrane  normal. No hemotympanum.      Nose: No nasal deformity.      Right Nostril: No septal hematoma.      Left Nostril: No septal hematoma.      Mouth/Throat:      Mouth: Mucous membranes are dry. No injury.   Eyes:      General: Lids are normal.      Conjunctiva/sclera:      Right eye: Right conjunctiva is not injected.      Left eye: Left conjunctiva is not injected.      Pupils: Pupils are equal, round, and reactive to light.   Neck:      Trachea: No tracheal deviation.   Cardiovascular:      Rate and Rhythm: Normal rate and regular rhythm.      Pulses:           Radial pulses are 2+ on the right side and 2+ on the left side.        Dorsalis pedis pulses are 2+ on the right side and 2+ on the left side.   Pulmonary:      Effort: No tachypnea or accessory muscle usage.      Breath sounds: No decreased breath sounds.   Chest:      Chest wall: No tenderness or crepitus.   Abdominal:      General: Abdomen is flat.      Palpations: Abdomen is soft.      Tenderness: There is no abdominal tenderness.   Musculoskeletal:      Right shoulder: No deformity or tenderness. Normal range of motion.      Left shoulder: No deformity or tenderness. Normal range of motion.      Right upper arm: No deformity or tenderness.      Left upper arm: No deformity or tenderness.      Right forearm: No deformity or tenderness.      Left forearm: No deformity or tenderness.      Right hand: No tenderness. Normal range of motion.      Left hand: No tenderness. Normal range of motion.      Cervical back: No crepitus. No spinous process tenderness.      Thoracic back: No deformity or bony tenderness.      Lumbar back: No deformity or bony tenderness.      Right hip: No bony tenderness or crepitus.      Left hip: No bony tenderness or crepitus.      Right upper leg: No deformity or tenderness.      Left upper leg: No deformity or tenderness.      Right knee: No bony tenderness.      Left knee: No bony tenderness.      Right lower leg: No deformity or  tenderness.      Left lower leg: No deformity or tenderness.   Skin:     General: Skin is warm and dry.      Capillary Refill: Capillary refill takes less than 2 seconds.      Findings: No abrasion, ecchymosis or laceration.   Neurological:      Mental Status: She is alert and oriented to person, place, and time.      GCS: GCS eye subscore is 4. GCS verbal subscore is 5. GCS motor subscore is 6.          DIAGNOSTIC RESULTS     LABS:  Labs Reviewed - No data to display    All other labs were within normal range or not returned as of this dictation.    Imaging  CT head wo IV contrast    (Results Pending)   CT maxillofacial bones wo IV contrast    (Results Pending)   CT cervical spine wo IV contrast    (Results Pending)   XR shoulder right 2+ views    (Results Pending)        Procedures  Laceration Repair    Performed by: Clementina Whyte DO  Authorized by: Ricardo Diaz DO    Consent:     Consent obtained:  Verbal    Consent given by:  Patient    Risks discussed:  Infection, poor cosmetic result and pain  Universal protocol:     Patient identity confirmed:  Verbally with patient and arm band  Anesthesia:     Anesthesia method:  Local infiltration    Local anesthetic:  Lidocaine 1% w/o epi  Laceration details:     Location:  Face    Face location:  Nose    Length (cm):  0.8  Exploration:     Contaminated: no    Treatment:     Area cleansed with:  Saline    Amount of cleaning:  Standard    Debridement:  None  Skin repair:     Repair method:  Sutures and Steri-Strips    Suture size:  6-0    Suture material:  Prolene    Suture technique:  Simple interrupted    Number of sutures:  1    Number of Steri-Strips:  1  Approximation:     Approximation:  Close  Repair type:     Repair type:  Simple  Post-procedure details:     Dressing:  Antibiotic ointment    Procedure completion:  Tolerated       EMERGENCY DEPARTMENT COURSE/MDM:   Nan Crane is a 74 y.o. female presenting to the ED for evaluation of had concerns including  Fall (Trip and fall hit head, denies loc, denies blood thinners, abrasion to forehead, c/o headache, swollen lip and cut to upper lip) and Shoulder Injury (Right shoulder pain/injury)..   Medical Decision Making  CTs of the head, C-spine, and face obtained as well as an x-ray of the shoulder.  Head CT showed no depressible fractures or intracranial bleeding.  CT of the C-spine is intact.  Facial CT shows a small nondisplaced fracture of the base of the nasal bone.  Noncomplicated nasal bone fracture not requiring antibiotics at this time.  Shoulder x-ray unremarkable.  Stellate laceration repaired under local anesthesia, with a single stitch and good alignment of closure.  Discharged in stable condition with instructions to follow-up outpatient with PCP.     =================Attending note===============    The patient was seen by the resident/fellow.  I have personally performed a substantive portion of the encounter.  I have seen and examined the patient; agree with the workup, evaluation, MDM,   management and diagnosis.  The care plan has been discussed with the resident; I have reviewed the resident's note and agree with the documented findings.      This is a 74 y.o. female who presents to ER after mechanical fall.  Patient was outside in the wind was very strong and it pushed her from behind and she fell forward and hit her face on the ground.  She did have some brief epistaxis.  She is not on blood thinners.  She did injure her right shoulder.  No loss of consciousness.  No nausea or vomiting.  She did sustain an abrasion to her lip on the inside.    There is some bruising to the forehead.  There is an abrasion across the nasal bow that will need cleaned and irrigated and potentially repaired.  No active epistaxis.  There is paraspinal cervical spine tenderness worse on the right than the left.  There is some tenderness over the anterior right shoulder.  She has good range of motion of the shoulder.  Heart  regular.  Lungs clear.  Abdomen soft and nontender.  She is in no distress.    No intracranial bleeding.  Possible nasal fracture.  No other facial fractures.  No cervical spine fracture.  No shoulder fracture.  There is arthritis.    Patient is discharged home.  She is to follow-up with her doctor.  She is to return to the nearest ER for any new or worsening symptoms.  She is satisfied with this plan.            ==========================================          Diagnoses as of 03/20/24 2007   Closed head injury, initial encounter   Open fracture of nasal bone, initial encounter   Facial laceration, initial encounter   Fall, initial encounter          External records reviewed: I reviewed external records including outpatient, PCP records, and prior discharge summaries    I have reviewed this case with the ED attending physician, and the attending agrees with the plan. Patient or family was counselled regarding labs, imaging, likely diagnosis, and plan. All questions were answered.     Clementina Whyte DO  PGY-4, emergency medicine    The above documentation was completed with the use of speech recognition software. It may contain dictation errors secondary to limitations of the software.      ED Medications administered this visit:  Medications - No data to display    New Prescriptions from this visit:    New Prescriptions    No medications on file       Final Impression: No diagnosis found.      (Please note that portions of this note were completed with a voice recognition program.  Efforts were made to edit the dictations but occasionally words are mis-transcribed.)     Clementina Whyte DO  Resident  03/20/24 3194

## 2024-03-20 NOTE — DISCHARGE INSTRUCTIONS
If you develop uncontrollable nausea or vomiting, vision changes, if it becomes difficult to wake up, or if you become disoriented or have significant neck pain, please return to the emergency department. You can alternate Tylenol and ibuprofen as needed for pain.  Please understand that with a closed head injury, you may have concussion symptoms: Headaches and light sensitivity for several weeks.  Please allow yourself to rest, and follow-up with your primary care provider as needed.    The sutures will need to be removed in 5 days.  Please follow-up with your primary care provider, or return to the emergency department for suture removal.  In the meantime, you can keep it clean and dry with regular soap, just do not scrub on it.  Please also avoid swimming or soaking the area.  Please return to the emergency department immediately if you develop fever, significant swelling, or if the wound site becomes hard, hot, or develops a discharge that looks like pus.    All wounds of this type will form scars.  The 2 most common factors that promote worsening scar formation is sun exposure and smoking.  You can minimize scar formation by using protective clothing, wearing sunscreen or hat, and otherwise protecting from sun, and refraining from tobacco or marijuana use until the wound has healed.    Seek immediate medical attention if you develop: new or worsening headache, nausea, vomiting, confusion, weakness, loss of motion in your arms or legs, loss of control of your urine or stool, difficulty waking from sleep, or any new or worsening symptoms.    Have someone check on you and wake you from sleep every 2 hours for the next 24 hours to make sure that you are doing well.    Seek immediate medical attention if you develop: increasing pain, numbness, tingling, weakness, loss of motion in your arms or legs, loss of control of your urine or stool, fever, abdominal pain, chest pain, shortness of breath, or any new or  worsening symptoms.    Seek immediate medical attention if your wound develops: redness, swelling, warmth to touch, discharge or drainage, increasing pain, or any new or worsening symptoms.

## 2024-03-20 NOTE — ED NOTES
Patient states she had mechanical fall. She states that she has pain in her shoulders. She has large lump with bruising and opening to forehead, nose and upper lip is swollen.      Sully Osorio RN  03/20/24 1817

## 2024-03-26 ENCOUNTER — OFFICE VISIT (OUTPATIENT)
Dept: PRIMARY CARE | Facility: CLINIC | Age: 74
End: 2024-03-26
Payer: COMMERCIAL

## 2024-03-26 VITALS
DIASTOLIC BLOOD PRESSURE: 70 MMHG | SYSTOLIC BLOOD PRESSURE: 126 MMHG | BODY MASS INDEX: 26.5 KG/M2 | WEIGHT: 144 LBS | HEIGHT: 62 IN | HEART RATE: 93 BPM | RESPIRATION RATE: 16 BRPM | OXYGEN SATURATION: 97 %

## 2024-03-26 DIAGNOSIS — S09.90XD CLOSED HEAD INJURY, SUBSEQUENT ENCOUNTER: ICD-10-CM

## 2024-03-26 DIAGNOSIS — M25.511 ACUTE PAIN OF RIGHT SHOULDER: ICD-10-CM

## 2024-03-26 DIAGNOSIS — Z48.02 VISIT FOR SUTURE REMOVAL: ICD-10-CM

## 2024-03-26 DIAGNOSIS — S01.81XS FACIAL LACERATION, SEQUELA: Primary | ICD-10-CM

## 2024-03-26 PROCEDURE — 1160F RVW MEDS BY RX/DR IN RCRD: CPT | Performed by: NURSE PRACTITIONER

## 2024-03-26 PROCEDURE — 4010F ACE/ARB THERAPY RXD/TAKEN: CPT | Performed by: NURSE PRACTITIONER

## 2024-03-26 PROCEDURE — 3074F SYST BP LT 130 MM HG: CPT | Performed by: NURSE PRACTITIONER

## 2024-03-26 PROCEDURE — 3078F DIAST BP <80 MM HG: CPT | Performed by: NURSE PRACTITIONER

## 2024-03-26 PROCEDURE — 1123F ACP DISCUSS/DSCN MKR DOCD: CPT | Performed by: NURSE PRACTITIONER

## 2024-03-26 PROCEDURE — 1157F ADVNC CARE PLAN IN RCRD: CPT | Performed by: NURSE PRACTITIONER

## 2024-03-26 PROCEDURE — S0630 REMOVAL OF SUTURES: HCPCS | Performed by: NURSE PRACTITIONER

## 2024-03-26 PROCEDURE — 99213 OFFICE O/P EST LOW 20 MIN: CPT | Performed by: NURSE PRACTITIONER

## 2024-03-26 PROCEDURE — 1159F MED LIST DOCD IN RCRD: CPT | Performed by: NURSE PRACTITIONER

## 2024-03-26 NOTE — PROGRESS NOTES
"Subjective   Patient ID: Nan Crane is a 74 y.o. female who presents for Suture / Staple Removal (Pt states her right arm/shoulder has pain. X-ray negative).    Patient has been taking extra strength tylenol for shoulder pain.     Suture / Staple Removal  The sutures were placed 3 to 6 days ago. She tried nothing since the wound repair. Her temperature was unmeasured prior to arrival. There has been no drainage from the wound. The redness has improved. The swelling has improved. The pain has improved.        Review of Systems   Constitutional:  Negative for chills, fatigue and fever.   HENT:  Negative for congestion, ear pain, rhinorrhea, sinus pressure and sore throat.    Eyes:  Negative for pain, discharge and itching.   Respiratory:  Negative for cough, shortness of breath and wheezing.    Cardiovascular:  Negative for chest pain and palpitations.   Gastrointestinal:  Negative for constipation, diarrhea, nausea and vomiting.   Genitourinary:  Negative for difficulty urinating and dysuria.   Musculoskeletal:  Positive for arthralgias and myalgias. Negative for back pain and joint swelling.   Skin:  Positive for wound. Negative for color change.   Neurological:  Negative for headaches.   Hematological:  Negative for adenopathy.   Psychiatric/Behavioral:  Negative for decreased concentration. The patient is not nervous/anxious.      Objective   /70   Pulse 93   Resp 16   Ht 1.575 m (5' 2\")   Wt 65.3 kg (144 lb)   SpO2 97%   BMI 26.34 kg/m²     Suture Removal    Date/Time: 4/13/2024 3:38 PM    Performed by: AIMEE Dinh  Authorized by: AIMEE Dinh    Consent:     Consent obtained:  Verbal    Consent given by:  Patient    Risks, benefits, and alternatives were discussed: yes      Risks discussed:  Bleeding, pain and wound separation    Alternatives discussed:  No treatment, delayed treatment, observation and referral  Universal protocol:     Procedure explained and questions " answered to patient or proxy's satisfaction: yes      Patient identity confirmed:  Verbally with patient  Location:     Location:  Head/neck    Head/neck location:  Forehead  Procedure details:     Wound appearance:  No signs of infection, good wound healing, clean, moist and pink    Number of sutures removed:  1  Post-procedure details:     Post-removal:  Antibiotic ointment applied and Band-Aid applied    Procedure completion:  Tolerated well, no immediate complications    Physical Exam  Constitutional:       Appearance: Normal appearance.   Cardiovascular:      Rate and Rhythm: Normal rate and regular rhythm.      Pulses: Normal pulses.      Heart sounds: Normal heart sounds.   Pulmonary:      Effort: Pulmonary effort is normal.      Breath sounds: Normal breath sounds.   Abdominal:      General: Bowel sounds are normal.      Palpations: Abdomen is soft.   Musculoskeletal:      Right shoulder: Bony tenderness (anterior aspect) present. Decreased range of motion.   Skin:     General: Skin is warm and dry.      Findings: Ecchymosis (bridge of nose and forehead) and signs of injury present.   Neurological:      Mental Status: She is alert.   Psychiatric:         Mood and Affect: Mood normal.         Behavior: Behavior normal.         Assessment/Plan   Problem List Items Addressed This Visit       Shoulder pain     Other Visit Diagnoses       Facial laceration, sequela    -  Primary    Closed head injury, subsequent encounter              Patient Instructions   Suture easily removed. Encouraged to keep the area clean and dry, and continue tylenol as needed for pain. Discussed gentle stretching of right shoulder. Follow-up with PCP in 1-2 weeks, or sooner if needed. Call the office if any problems or concerns in the meantime.

## 2024-03-28 DIAGNOSIS — E66.9 DIABETES MELLITUS TYPE 2 IN OBESE: ICD-10-CM

## 2024-03-28 DIAGNOSIS — E11.69 DIABETES MELLITUS TYPE 2 IN OBESE: ICD-10-CM

## 2024-03-28 RX ORDER — FLASH GLUCOSE SENSOR
KIT MISCELLANEOUS
Qty: 6 EACH | Refills: 1 | Status: SHIPPED | OUTPATIENT
Start: 2024-03-28

## 2024-04-12 DIAGNOSIS — E11.9 TYPE 2 DIABETES MELLITUS WITHOUT COMPLICATION, UNSPECIFIED WHETHER LONG TERM INSULIN USE (MULTI): ICD-10-CM

## 2024-04-12 RX ORDER — DULAGLUTIDE 0.75 MG/.5ML
0.75 INJECTION, SOLUTION SUBCUTANEOUS
Qty: 2 ML | Refills: 1 | Status: SHIPPED | OUTPATIENT
Start: 2024-04-14 | End: 2024-06-06

## 2024-04-13 ASSESSMENT — ENCOUNTER SYMPTOMS
WOUND: 1
FEVER: 0
ADENOPATHY: 0
DIARRHEA: 0
JOINT SWELLING: 0
EYE ITCHING: 0
NERVOUS/ANXIOUS: 0
SINUS PRESSURE: 0
NAUSEA: 0
DIFFICULTY URINATING: 0
FATIGUE: 0
SORE THROAT: 0
COLOR CHANGE: 0
WHEEZING: 0
PALPITATIONS: 0
EYE PAIN: 0
BACK PAIN: 0
CHILLS: 0
VOMITING: 0
RHINORRHEA: 0
EYE DISCHARGE: 0
CONSTIPATION: 0
DYSURIA: 0
COUGH: 0
ARTHRALGIAS: 1
DECREASED CONCENTRATION: 0
MYALGIAS: 1
SHORTNESS OF BREATH: 0
HEADACHES: 0

## 2024-04-13 NOTE — PATIENT INSTRUCTIONS
Suture easily removed. Encouraged to keep the area clean and dry, and continue tylenol as needed for pain. Discussed gentle stretching of right shoulder. Follow-up with PCP in 1-2 weeks, or sooner if needed. Call the office if any problems or concerns in the meantime.

## 2024-04-17 DIAGNOSIS — J30.1 SEASONAL ALLERGIC RHINITIS DUE TO POLLEN: ICD-10-CM

## 2024-04-17 DIAGNOSIS — E11.9 CONTROLLED TYPE 2 DIABETES MELLITUS WITHOUT COMPLICATION, WITHOUT LONG-TERM CURRENT USE OF INSULIN (MULTI): ICD-10-CM

## 2024-04-17 RX ORDER — EMPAGLIFLOZIN 25 MG/1
25 TABLET, FILM COATED ORAL DAILY
Qty: 90 TABLET | Refills: 1 | Status: SHIPPED | OUTPATIENT
Start: 2024-04-17 | End: 2024-04-26 | Stop reason: SDUPTHER

## 2024-04-17 RX ORDER — FLUTICASONE PROPIONATE 50 MCG
1 SPRAY, SUSPENSION (ML) NASAL AS NEEDED
Qty: 50 ML | Refills: 3 | Status: SHIPPED | OUTPATIENT
Start: 2024-04-17 | End: 2024-06-05 | Stop reason: ALTCHOICE

## 2024-04-17 RX ORDER — FLUTICASONE PROPIONATE 50 MCG
1 SPRAY, SUSPENSION (ML) NASAL AS NEEDED
Qty: 32 ML | Refills: 1 | Status: SHIPPED | OUTPATIENT
Start: 2024-04-17 | End: 2025-04-17

## 2024-04-26 ENCOUNTER — TELEPHONE (OUTPATIENT)
Dept: ENDOCRINOLOGY | Facility: CLINIC | Age: 74
End: 2024-04-26
Payer: COMMERCIAL

## 2024-04-26 DIAGNOSIS — E11.9 CONTROLLED TYPE 2 DIABETES MELLITUS WITHOUT COMPLICATION, WITHOUT LONG-TERM CURRENT USE OF INSULIN (MULTI): ICD-10-CM

## 2024-04-30 ENCOUNTER — LAB (OUTPATIENT)
Dept: LAB | Facility: LAB | Age: 74
End: 2024-04-30
Payer: COMMERCIAL

## 2024-04-30 DIAGNOSIS — E55.9 VITAMIN D DEFICIENCY: ICD-10-CM

## 2024-04-30 DIAGNOSIS — F32.1 DEPRESSION, MAJOR, SINGLE EPISODE, MODERATE (MULTI): ICD-10-CM

## 2024-04-30 DIAGNOSIS — N18.30 STAGE 3 CHRONIC KIDNEY DISEASE, UNSPECIFIED WHETHER STAGE 3A OR 3B CKD (MULTI): ICD-10-CM

## 2024-04-30 DIAGNOSIS — E11.9 CONTROLLED TYPE 2 DIABETES MELLITUS WITHOUT COMPLICATION, WITHOUT LONG-TERM CURRENT USE OF INSULIN (MULTI): ICD-10-CM

## 2024-04-30 DIAGNOSIS — E78.49 OTHER HYPERLIPIDEMIA: ICD-10-CM

## 2024-04-30 LAB
25(OH)D3 SERPL-MCNC: 41 NG/ML (ref 30–100)
CHOLEST SERPL-MCNC: 146 MG/DL (ref 0–199)
CHOLESTEROL/HDL RATIO: 2.4
CREAT UR-MCNC: 73.9 MG/DL (ref 20–320)
HCV AB SER QL: NONREACTIVE
HDLC SERPL-MCNC: 59.7 MG/DL
LDLC SERPL CALC-MCNC: 64 MG/DL
MAGNESIUM SERPL-MCNC: 1.71 MG/DL (ref 1.6–2.4)
MICROALBUMIN UR-MCNC: <7 MG/L
MICROALBUMIN/CREAT UR: NORMAL MG/G{CREAT}
NON HDL CHOLESTEROL: 86 MG/DL (ref 0–149)
TRIGL SERPL-MCNC: 110 MG/DL (ref 0–149)
TSH SERPL-ACNC: 2.86 MIU/L (ref 0.44–3.98)
VIT B12 SERPL-MCNC: 770 PG/ML (ref 211–911)
VLDL: 22 MG/DL (ref 0–40)

## 2024-04-30 PROCEDURE — 86803 HEPATITIS C AB TEST: CPT

## 2024-04-30 PROCEDURE — 80053 COMPREHEN METABOLIC PANEL: CPT

## 2024-04-30 PROCEDURE — 84443 ASSAY THYROID STIM HORMONE: CPT

## 2024-04-30 PROCEDURE — 82607 VITAMIN B-12: CPT

## 2024-04-30 PROCEDURE — 83735 ASSAY OF MAGNESIUM: CPT

## 2024-04-30 PROCEDURE — 80061 LIPID PANEL: CPT

## 2024-04-30 PROCEDURE — 82043 UR ALBUMIN QUANTITATIVE: CPT

## 2024-04-30 PROCEDURE — 36415 COLL VENOUS BLD VENIPUNCTURE: CPT

## 2024-04-30 PROCEDURE — 82306 VITAMIN D 25 HYDROXY: CPT

## 2024-04-30 PROCEDURE — 82570 ASSAY OF URINE CREATININE: CPT

## 2024-05-02 ENCOUNTER — OFFICE VISIT (OUTPATIENT)
Dept: PRIMARY CARE | Facility: CLINIC | Age: 74
End: 2024-05-02
Payer: COMMERCIAL

## 2024-05-02 VITALS
HEART RATE: 98 BPM | RESPIRATION RATE: 16 BRPM | SYSTOLIC BLOOD PRESSURE: 110 MMHG | BODY MASS INDEX: 26.24 KG/M2 | TEMPERATURE: 97.6 F | OXYGEN SATURATION: 96 % | DIASTOLIC BLOOD PRESSURE: 60 MMHG | WEIGHT: 142.6 LBS | HEIGHT: 62 IN

## 2024-05-02 DIAGNOSIS — F32.1 DEPRESSION, MAJOR, SINGLE EPISODE, MODERATE (MULTI): ICD-10-CM

## 2024-05-02 DIAGNOSIS — E11.9 CONTROLLED TYPE 2 DIABETES MELLITUS WITHOUT COMPLICATION, WITHOUT LONG-TERM CURRENT USE OF INSULIN (MULTI): ICD-10-CM

## 2024-05-02 DIAGNOSIS — Z09 HOSPITAL DISCHARGE FOLLOW-UP: ICD-10-CM

## 2024-05-02 DIAGNOSIS — N18.30 STAGE 3 CHRONIC KIDNEY DISEASE, UNSPECIFIED WHETHER STAGE 3A OR 3B CKD (MULTI): ICD-10-CM
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
## 2024-05-02 DIAGNOSIS — J30.2 SEASONAL ALLERGIC RHINITIS, UNSPECIFIED TRIGGER: ICD-10-CM

## 2024-05-02 DIAGNOSIS — I10 PRIMARY HYPERTENSION: ICD-10-CM

## 2024-05-02 PROBLEM — S09.90XA CLOSED HEAD INJURY: Status: ACTIVE | Noted: 2024-05-02
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
## 2024-05-02 PROBLEM — C7A.8 MALIGNANT NEUROENDOCRINE NEOPLASM (MULTI): Status: ACTIVE | Noted: 2023-06-30

## 2024-05-02 PROBLEM — Z86.79 HISTORY OF HYPERTENSION: Status: ACTIVE | Noted: 2024-05-02

## 2024-05-02 PROBLEM — S01.81XA FACIAL LACERATION: Status: ACTIVE | Noted: 2024-05-02

## 2024-05-02 PROBLEM — Z86.79 HISTORY OF HYPERTENSION: Status: RESOLVED | Noted: 2024-05-02 | Resolved: 2024-05-02

## 2024-05-02 PROBLEM — N18.2 STAGE 2 CHRONIC KIDNEY DISEASE: Status: RESOLVED | Noted: 2023-02-10 | Resolved: 2024-05-02

## 2024-05-02 PROBLEM — Z86.39 HISTORY OF DIABETES MELLITUS: Status: ACTIVE | Noted: 2024-05-02

## 2024-05-02 PROBLEM — Z86.19 HISTORY OF HERPES ZOSTER: Status: ACTIVE | Noted: 2024-05-02

## 2024-05-02 PROBLEM — S02.2XXB OPEN FRACTURE OF NASAL BONE: Status: ACTIVE | Noted: 2024-05-02

## 2024-05-02 LAB
ALBUMIN SERPL BCP-MCNC: 4.1 G/DL (ref 3.4–5)
ALP SERPL-CCNC: 74 U/L (ref 33–136)
ALT SERPL W P-5'-P-CCNC: 16 U/L (ref 7–45)
ANION GAP SERPL CALC-SCNC: 16 MMOL/L (ref 10–20)
AST SERPL W P-5'-P-CCNC: 14 U/L (ref 9–39)
BILIRUB SERPL-MCNC: 0.5 MG/DL (ref 0–1.2)
BUN SERPL-MCNC: 26 MG/DL (ref 6–23)
CALCIUM SERPL-MCNC: 10.2 MG/DL (ref 8.6–10.3)
CHLORIDE SERPL-SCNC: 107 MMOL/L (ref 98–107)
CO2 SERPL-SCNC: 22 MMOL/L (ref 21–32)
CREAT SERPL-MCNC: 1.23 MG/DL (ref 0.5–1.05)
EGFRCR SERPLBLD CKD-EPI 2021: 46 ML/MIN/1.73M*2
GLUCOSE SERPL-MCNC: 125 MG/DL (ref 74–99)
POTASSIUM SERPL-SCNC: 4.3 MMOL/L (ref 3.5–5.3)
PROT SERPL-MCNC: 6.7 G/DL (ref 6.4–8.2)
SODIUM SERPL-SCNC: 141 MMOL/L (ref 136–145)

## 2024-05-02 PROCEDURE — 1123F ACP DISCUSS/DSCN MKR DOCD: CPT | Performed by: INTERNAL MEDICINE

## 2024-05-02 PROCEDURE — 1159F MED LIST DOCD IN RCRD: CPT | Performed by: INTERNAL MEDICINE

## 2024-05-02 PROCEDURE — 3048F LDL-C <100 MG/DL: CPT | Performed by: INTERNAL MEDICINE

## 2024-05-02 PROCEDURE — 1160F RVW MEDS BY RX/DR IN RCRD: CPT | Performed by: INTERNAL MEDICINE

## 2024-05-02 PROCEDURE — 99214 OFFICE O/P EST MOD 30 MIN: CPT | Performed by: INTERNAL MEDICINE

## 2024-05-02 PROCEDURE — 3062F POS MACROALBUMINURIA REV: CPT | Performed by: INTERNAL MEDICINE

## 2024-05-02 PROCEDURE — 3074F SYST BP LT 130 MM HG: CPT | Performed by: INTERNAL MEDICINE

## 2024-05-02 PROCEDURE — 3078F DIAST BP <80 MM HG: CPT | Performed by: INTERNAL MEDICINE

## 2024-05-02 PROCEDURE — 1157F ADVNC CARE PLAN IN RCRD: CPT | Performed by: INTERNAL MEDICINE

## 2024-05-02 PROCEDURE — 4010F ACE/ARB THERAPY RXD/TAKEN: CPT | Performed by: INTERNAL MEDICINE

## 2024-05-02 ASSESSMENT — ENCOUNTER SYMPTOMS
SLEEP DISTURBANCE: 0
ARTHRALGIAS: 0
ADENOPATHY: 0
CHEST TIGHTNESS: 0
PALPITATIONS: 0
ABDOMINAL PAIN: 0
WHEEZING: 0
JOINT SWELLING: 0
CONSTIPATION: 0
DIARRHEA: 0
UNEXPECTED WEIGHT CHANGE: 0
VOMITING: 0
CONFUSION: 0
COUGH: 0
CHILLS: 0
FATIGUE: 0
DYSURIA: 0
DIZZINESS: 0
SHORTNESS OF BREATH: 0
WEAKNESS: 0
NAUSEA: 0
SORE THROAT: 0

## 2024-05-02 ASSESSMENT — PATIENT HEALTH QUESTIONNAIRE - PHQ9
1. LITTLE INTEREST OR PLEASURE IN DOING THINGS: NOT AT ALL
2. FEELING DOWN, DEPRESSED OR HOPELESS: NOT AT ALL
SUM OF ALL RESPONSES TO PHQ9 QUESTIONS 1 AND 2: 0

## 2024-05-02 NOTE — PATIENT INSTRUCTIONS
Was nice seeing you today.  Continue same medication.  Have lab work done before next appointment if labs were ordered today.  Fu in 3 month.  Call/ contact our office with any concerns.    If you have labs or test done and you can't see the report in your chart or you didn't here from us in 2 weeks after test/labs done , please, call our office for reports.  Please , do not assume that they were normal.        Ways to Help Prevent Falls at Home    Quick Tips   ? Ask for help if you need it. Most people want to help!   ? Get up slowly after sitting or laying down   ? Wear a medical alert device or keep cell phone in your pocket   ? Use night lights, especially areas near a bathroom   ? Keep the items you use often within reach on a small stool or end table   ? Use an assistive device such as walker or cane, as directed by provider/physical therapy   ? Use a non-slip mat and grab bars in your bathroom. Look for home health sections for best options     Other Areas to Focus On   ? Exercise and nutrition: Regular exercise or taking a falls prevention class are great ways improve strength and balance. Don’t forget to stay hydrated and bring a snack!   ? Medicine side effects: Some medicines can make you sleepy or dizzy, which could cause a fall. Ask your healthcare provider about the side effects your medicines could cause. Be sure to let them know if you take any vitamins or supplements as well.   ? Tripping hazards: Remove items you could trip on, such as loose mats, rugs, cords, and clutter. Wear closed toe shoes with rubber soles.   ? Health and wellness: Get regular checkups with your healthcare provider, plus routine vision and hearing screenings. Talk with your healthcare provider about:   o Your medicines and the possible side effects - bring them in a bag if that is easier!   o Problems with balance or feeling dizzy   o Ways to promote bone health, such as Vitamin D and calcium supplements   o Questions or  concerns about falling     *Ask your healthcare team if you have questions     ©[facility], 2022

## 2024-05-02 NOTE — PROGRESS NOTES
Subjective   Nan Crane is a 74 y.o. female who presents for Follow-up (3 months follow up).  3 months follow up depression - controlled with TX per patient statement   Follow up HTN-checking BP  at home sometimes and is good /per patient statement   Seeing dr Granado for DM,last HGBA1C was 6.3 on 2.5.2024  Labs -  and urine albumin- 4.2024  Fell 1 month ago, broke her nose    it still hurts  and swelling- she fell outside head first in the parking lot, hurt the face, head ,she did go to ER -3.20.2024,  she did have  a follow up with our NP -3.26.24  , took stitches out   Upper lip  cut inside from the fall ,is better  but still not feeling Ok .  She which is good today and I do see   Seasonal allergies on/off x 1 month,getting worse , runny nose ,itchy  eyes, cough- used spray nasal, Claritin DM  OTC  med , helps some, yesterday was bad , today better ,no SOB  Hospital records, labs and test reviewed, discharge instruction and medication discussed with pt.        Review of Systems   Constitutional:  Negative for chills, fatigue and unexpected weight change.        Comment   HENT:  Negative for congestion, ear pain and sore throat.    Respiratory:  Negative for cough, chest tightness, shortness of breath and wheezing.    Cardiovascular:  Negative for palpitations and leg swelling.   Gastrointestinal:  Negative for abdominal pain, constipation, diarrhea, nausea and vomiting.   Genitourinary:  Negative for dysuria and urgency.   Musculoskeletal:  Negative for arthralgias and joint swelling.   Skin:  Negative for rash.   Neurological:  Negative for dizziness and weakness.   Hematological:  Negative for adenopathy.   Psychiatric/Behavioral:  Negative for confusion and sleep disturbance.        Objective   Physical Exam  Constitutional:       Appearance: Normal appearance.   HENT:      Head: Normocephalic and atraumatic.   Eyes:      Pupils: Pupils are equal, round, and reactive to light.   Cardiovascular:      Rate  "and Rhythm: Normal rate and regular rhythm.   Pulmonary:      Effort: Pulmonary effort is normal.      Breath sounds: Normal breath sounds.   Musculoskeletal:         General: Normal range of motion.      Cervical back: Normal range of motion and neck supple.      Right foot: No deformity, bunion or Charcot foot.      Left foot: No deformity, bunion or Charcot foot.   Feet:      Right foot:      Skin integrity: Skin integrity normal. No ulcer, callus or fissure.      Toenail Condition: Right toenails are normal.      Left foot:      Skin integrity: Skin integrity normal. No ulcer, callus or fissure.      Toenail Condition: Left toenails are normal.      Comments: Foot exam normal,   Skin:     General: Skin is warm.   Neurological:      General: No focal deficit present.      Mental Status: She is alert and oriented to person, place, and time.   Psychiatric:         Mood and Affect: Mood normal.         Behavior: Behavior normal.       /60 (BP Location: Right arm, Patient Position: Sitting)   Pulse 98   Temp 36.4 °C (97.6 °F)   Resp 16   Ht 1.575 m (5' 2\")   Wt 64.7 kg (142 lb 9.6 oz)   SpO2 96%   BMI 26.08 kg/m²   Lab Results   Component Value Date    CHOL 146 04/30/2024    CHOL 141 11/10/2022    CHOL 124 02/15/2022     Lab Results   Component Value Date    HDL 59.7 04/30/2024    HDL 52.0 11/10/2022    HDL 39.0 (A) 02/15/2022     Lab Results   Component Value Date    LDLCALC 64 04/30/2024     Lab Results   Component Value Date    TRIG 110 04/30/2024    TRIG 118 11/10/2022    TRIG 198 (H) 02/15/2022     No components found for: \"CHOLHDL\"  Lab Results   Component Value Date    TSH 2.86 04/30/2024     .    Assessment/Plan   Problem List Items Addressed This Visit       Allergic rhinitis    CKD (chronic kidney disease) stage 3, GFR 30-59 ml/min (Multi)    Relevant Orders    CBC and Auto Differential    Comprehensive Metabolic Panel    Depression, major, single episode, moderate (Multi)     Well controlled " on current tx, no medication side effects, continue same tx         Diabetes mellitus type II, controlled, with no complications (Multi)     Has fu with endo, medication rev, no side effects         HTN (hypertension) - Primary    Fall     Patient was identified as a fall risk. Risk prevention instructions provided.

## 2024-06-05 ENCOUNTER — OFFICE VISIT (OUTPATIENT)
Dept: ENDOCRINOLOGY | Facility: CLINIC | Age: 74
End: 2024-06-05
Payer: COMMERCIAL

## 2024-06-05 VITALS
HEIGHT: 62 IN | SYSTOLIC BLOOD PRESSURE: 126 MMHG | DIASTOLIC BLOOD PRESSURE: 60 MMHG | BODY MASS INDEX: 25.76 KG/M2 | WEIGHT: 140 LBS

## 2024-06-05 DIAGNOSIS — I10 PRIMARY HYPERTENSION: Primary | ICD-10-CM

## 2024-06-05 DIAGNOSIS — E78.5 HYPERLIPIDEMIA, UNSPECIFIED HYPERLIPIDEMIA TYPE: ICD-10-CM

## 2024-06-05 DIAGNOSIS — E11.9 CONTROLLED TYPE 2 DIABETES MELLITUS WITHOUT COMPLICATION, WITHOUT LONG-TERM CURRENT USE OF INSULIN (MULTI): ICD-10-CM

## 2024-06-05 DIAGNOSIS — Z97.8 USES SELF-APPLIED CONTINUOUS GLUCOSE MONITORING DEVICE: ICD-10-CM

## 2024-06-05 LAB
POC FINGERSTICK BLOOD GLUCOSE: 115 MG/DL (ref 70–100)
POC HEMOGLOBIN A1C: 6.4 % (ref 4.2–6.5)

## 2024-06-05 PROCEDURE — 3078F DIAST BP <80 MM HG: CPT | Performed by: STUDENT IN AN ORGANIZED HEALTH CARE EDUCATION/TRAINING PROGRAM

## 2024-06-05 PROCEDURE — 3062F POS MACROALBUMINURIA REV: CPT | Performed by: STUDENT IN AN ORGANIZED HEALTH CARE EDUCATION/TRAINING PROGRAM

## 2024-06-05 PROCEDURE — 1157F ADVNC CARE PLAN IN RCRD: CPT | Performed by: STUDENT IN AN ORGANIZED HEALTH CARE EDUCATION/TRAINING PROGRAM

## 2024-06-05 PROCEDURE — 83036 HEMOGLOBIN GLYCOSYLATED A1C: CPT | Performed by: STUDENT IN AN ORGANIZED HEALTH CARE EDUCATION/TRAINING PROGRAM

## 2024-06-05 PROCEDURE — 1159F MED LIST DOCD IN RCRD: CPT | Performed by: STUDENT IN AN ORGANIZED HEALTH CARE EDUCATION/TRAINING PROGRAM

## 2024-06-05 PROCEDURE — 82962 GLUCOSE BLOOD TEST: CPT | Performed by: STUDENT IN AN ORGANIZED HEALTH CARE EDUCATION/TRAINING PROGRAM

## 2024-06-05 PROCEDURE — 95251 CONT GLUC MNTR ANALYSIS I&R: CPT | Performed by: STUDENT IN AN ORGANIZED HEALTH CARE EDUCATION/TRAINING PROGRAM

## 2024-06-05 PROCEDURE — 3074F SYST BP LT 130 MM HG: CPT | Performed by: STUDENT IN AN ORGANIZED HEALTH CARE EDUCATION/TRAINING PROGRAM

## 2024-06-05 PROCEDURE — 99214 OFFICE O/P EST MOD 30 MIN: CPT | Performed by: STUDENT IN AN ORGANIZED HEALTH CARE EDUCATION/TRAINING PROGRAM

## 2024-06-05 PROCEDURE — 1123F ACP DISCUSS/DSCN MKR DOCD: CPT | Performed by: STUDENT IN AN ORGANIZED HEALTH CARE EDUCATION/TRAINING PROGRAM

## 2024-06-05 PROCEDURE — 4010F ACE/ARB THERAPY RXD/TAKEN: CPT | Performed by: STUDENT IN AN ORGANIZED HEALTH CARE EDUCATION/TRAINING PROGRAM

## 2024-06-05 PROCEDURE — 3048F LDL-C <100 MG/DL: CPT | Performed by: STUDENT IN AN ORGANIZED HEALTH CARE EDUCATION/TRAINING PROGRAM

## 2024-06-05 ASSESSMENT — ENCOUNTER SYMPTOMS: CONSTITUTIONAL NEGATIVE: 1

## 2024-06-05 NOTE — PROGRESS NOTES
"Subjective   Patient ID: Nan Crane is a 74 y.o. female who presents for Diabetes (Patient ID: Nan Crane is a 74 y.o. female who presents for Diabetes 2 (Dx dm: 10+ years /PCP: [facility] /Podiatry: does not see one /Eye exam: yearly; /Patient testing glucose daily with freestyle nory reader (original). Due to fluctuating blood glucose and hypoglycemia. Trulicity q week, Compliant  with diabetic regime.  Pt is adherent and benefiting  from CGM treatment plan)   Lab Results   Component Value Date    HGBA1C 6.4 06/05/2024      HPI  The patient is a known diabetic for 10 years presents for follow up  In office hba1c is 6.4%  She lost 6 lbs   She had a fall in March sustaining facial laceration and bruising      freestyle nory downloads reviewed   target 94%   high 5 %   low 1 % ,  when sleeping on sensor side , not accurate   Very low 0 %     current regimen   metformin 1 gm BID   off Glipizide  Jardiance 25 mg    Trulclity 0.75 mg weekly , was Byduren 2 mg ( has difficulty opening and inject  Review of Systems   Constitutional: Negative.        Objective   Physical Exam  Constitutional:       Appearance: Normal appearance.   Cardiovascular:      Rate and Rhythm: Normal rate and regular rhythm.   Pulmonary:      Effort: Pulmonary effort is normal.      Breath sounds: Normal breath sounds.   Neurological:      Mental Status: She is alert.   Psychiatric:         Mood and Affect: Mood normal.      Blood pressure 126/60, height 1.575 m (5' 2\"), weight 63.5 kg (140 lb).      Assessment/Plan         - Well controlled DM2 with hba1c of 6.4 , on oral agents and GLP1  - DM nephropathy with urine albumin/creat  now wnl  on ARB and SGLT2  - HTN controlled on chlorthalidone , Noravsc and ARB  - HLD controlled LDL  ,on Rosuvastatin 10 mg   - BPV , no recent episodes.  - Newly diagnosed Carcinoid neuroendocrine tumor s/p lobectomy      plan :  - Continue Trulclity 0.75 mg ( Had difficulty using Byduren pen).   -continue metformin " and Jardiance at current dose.   - Advised to adhere to a Diabetic low carb diet.     RTC in [facility]

## 2024-06-06 DIAGNOSIS — E11.9 TYPE 2 DIABETES MELLITUS WITHOUT COMPLICATION, UNSPECIFIED WHETHER LONG TERM INSULIN USE (MULTI): ICD-10-CM

## 2024-06-06 DIAGNOSIS — Z00.00 HEALTH CARE MAINTENANCE: ICD-10-CM

## 2024-06-06 RX ORDER — SPIRONOLACTONE 25 MG/1
25 TABLET ORAL DAILY
Qty: 90 TABLET | Refills: 2 | Status: SHIPPED | OUTPATIENT
Start: 2024-06-06

## 2024-06-06 RX ORDER — DULAGLUTIDE 0.75 MG/.5ML
0.75 INJECTION, SOLUTION SUBCUTANEOUS
Qty: 2 ML | Refills: 3 | Status: SHIPPED | OUTPATIENT
Start: 2024-06-09

## 2024-07-24 DIAGNOSIS — F32.1 DEPRESSION, MAJOR, SINGLE EPISODE, MODERATE (MULTI): ICD-10-CM

## 2024-07-24 DIAGNOSIS — R25.1 TREMOR OF BOTH HANDS: ICD-10-CM

## 2024-07-24 DIAGNOSIS — Z00.00 HEALTH CARE MAINTENANCE: ICD-10-CM

## 2024-07-24 DIAGNOSIS — Z00.00 ENCOUNTER FOR GENERAL ADULT MEDICAL EXAMINATION WITHOUT ABNORMAL FINDINGS: ICD-10-CM

## 2024-07-24 RX ORDER — ROSUVASTATIN CALCIUM 10 MG/1
10 TABLET, COATED ORAL NIGHTLY
Qty: 90 TABLET | Refills: 3 | Status: SHIPPED | OUTPATIENT
Start: 2024-07-24

## 2024-07-24 RX ORDER — LOSARTAN POTASSIUM 100 MG/1
100 TABLET ORAL DAILY
Qty: 90 TABLET | Refills: 3 | Status: SHIPPED | OUTPATIENT
Start: 2024-07-24

## 2024-07-24 RX ORDER — OXYBUTYNIN CHLORIDE 15 MG/1
15 TABLET, EXTENDED RELEASE ORAL DAILY
Qty: 90 TABLET | Refills: 3 | Status: SHIPPED | OUTPATIENT
Start: 2024-07-24

## 2024-07-24 RX ORDER — BUPROPION HYDROCHLORIDE 200 MG/1
TABLET, EXTENDED RELEASE ORAL
Qty: 180 TABLET | Refills: 1 | Status: SHIPPED | OUTPATIENT
Start: 2024-07-24

## 2024-08-12 ENCOUNTER — APPOINTMENT (OUTPATIENT)
Dept: PRIMARY CARE | Facility: CLINIC | Age: 74
End: 2024-08-12
Payer: COMMERCIAL

## 2024-08-19 ENCOUNTER — LAB (OUTPATIENT)
Dept: LAB | Facility: LAB | Age: 74
End: 2024-08-19
Payer: COMMERCIAL

## 2024-08-19 DIAGNOSIS — N18.30 STAGE 3 CHRONIC KIDNEY DISEASE, UNSPECIFIED WHETHER STAGE 3A OR 3B CKD (MULTI): ICD-10-CM

## 2024-08-19 LAB
BASOPHILS # BLD AUTO: 0.02 X10*3/UL (ref 0–0.1)
BASOPHILS NFR BLD AUTO: 0.3 %
EOSINOPHIL # BLD AUTO: 0.4 X10*3/UL (ref 0–0.4)
EOSINOPHIL NFR BLD AUTO: 6.4 %
ERYTHROCYTE [DISTWIDTH] IN BLOOD BY AUTOMATED COUNT: 13.7 % (ref 11.5–14.5)
HCT VFR BLD AUTO: 39.3 % (ref 36–46)
HGB BLD-MCNC: 12.3 G/DL (ref 12–16)
IMM GRANULOCYTES # BLD AUTO: 0.01 X10*3/UL (ref 0–0.5)
IMM GRANULOCYTES NFR BLD AUTO: 0.2 % (ref 0–0.9)
LYMPHOCYTES # BLD AUTO: 1.99 X10*3/UL (ref 0.8–3)
LYMPHOCYTES NFR BLD AUTO: 31.6 %
MCH RBC QN AUTO: 31.1 PG (ref 26–34)
MCHC RBC AUTO-ENTMCNC: 31.3 G/DL (ref 32–36)
MCV RBC AUTO: 100 FL (ref 80–100)
MONOCYTES # BLD AUTO: 0.5 X10*3/UL (ref 0.05–0.8)
MONOCYTES NFR BLD AUTO: 7.9 %
NEUTROPHILS # BLD AUTO: 3.37 X10*3/UL (ref 1.6–5.5)
NEUTROPHILS NFR BLD AUTO: 53.6 %
NRBC BLD-RTO: 0 /100 WBCS (ref 0–0)
PLATELET # BLD AUTO: 236 X10*3/UL (ref 150–450)
RBC # BLD AUTO: 3.95 X10*6/UL (ref 4–5.2)
WBC # BLD AUTO: 6.3 X10*3/UL (ref 4.4–11.3)

## 2024-08-19 PROCEDURE — 36415 COLL VENOUS BLD VENIPUNCTURE: CPT

## 2024-08-19 PROCEDURE — 85025 COMPLETE CBC W/AUTO DIFF WBC: CPT

## 2024-08-21 ENCOUNTER — APPOINTMENT (OUTPATIENT)
Dept: PRIMARY CARE | Facility: CLINIC | Age: 74
End: 2024-08-21
Payer: COMMERCIAL

## 2024-08-21 VITALS
SYSTOLIC BLOOD PRESSURE: 120 MMHG | DIASTOLIC BLOOD PRESSURE: 78 MMHG | TEMPERATURE: 97.3 F | OXYGEN SATURATION: 97 % | RESPIRATION RATE: 16 BRPM | HEIGHT: 62 IN | BODY MASS INDEX: 25.95 KG/M2 | WEIGHT: 141 LBS | HEART RATE: 82 BPM

## 2024-08-21 DIAGNOSIS — N18.30 STAGE 3 CHRONIC KIDNEY DISEASE, UNSPECIFIED WHETHER STAGE 3A OR 3B CKD (MULTI): ICD-10-CM

## 2024-08-21 DIAGNOSIS — R11.10 VOMITING, UNSPECIFIED VOMITING TYPE, UNSPECIFIED WHETHER NAUSEA PRESENT: ICD-10-CM

## 2024-08-21 DIAGNOSIS — I10 PRIMARY HYPERTENSION: ICD-10-CM
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
## 2024-08-21 DIAGNOSIS — M25.511 RIGHT SHOULDER PAIN, UNSPECIFIED CHRONICITY: Primary | ICD-10-CM

## 2024-08-21 PROCEDURE — 99214 OFFICE O/P EST MOD 30 MIN: CPT | Performed by: INTERNAL MEDICINE

## 2024-08-21 PROCEDURE — 3074F SYST BP LT 130 MM HG: CPT | Performed by: INTERNAL MEDICINE

## 2024-08-21 PROCEDURE — 1123F ACP DISCUSS/DSCN MKR DOCD: CPT | Performed by: INTERNAL MEDICINE

## 2024-08-21 PROCEDURE — 4010F ACE/ARB THERAPY RXD/TAKEN: CPT | Performed by: INTERNAL MEDICINE

## 2024-08-21 PROCEDURE — 1160F RVW MEDS BY RX/DR IN RCRD: CPT | Performed by: INTERNAL MEDICINE

## 2024-08-21 PROCEDURE — 3062F POS MACROALBUMINURIA REV: CPT | Performed by: INTERNAL MEDICINE

## 2024-08-21 PROCEDURE — 1157F ADVNC CARE PLAN IN RCRD: CPT | Performed by: INTERNAL MEDICINE

## 2024-08-21 PROCEDURE — 1159F MED LIST DOCD IN RCRD: CPT | Performed by: INTERNAL MEDICINE

## 2024-08-21 PROCEDURE — 1036F TOBACCO NON-USER: CPT | Performed by: INTERNAL MEDICINE

## 2024-08-21 PROCEDURE — 3008F BODY MASS INDEX DOCD: CPT | Performed by: INTERNAL MEDICINE

## 2024-08-21 PROCEDURE — 3048F LDL-C <100 MG/DL: CPT | Performed by: INTERNAL MEDICINE

## 2024-08-21 PROCEDURE — 3078F DIAST BP <80 MM HG: CPT | Performed by: INTERNAL MEDICINE

## 2024-08-21 NOTE — PATIENT INSTRUCTIONS
Was nice seeing you today.  Continue same medication.  Have lab work done before next appointment if labs were ordered today.  Fu in 2 month.  Call/ contact our office with any concerns.    If you have labs or test done and you can't see the report in your chart or you didn't hear from us in 2 weeks after test/labs done , please, call our office for reports.  Please , do not assume that they were normal.    Any test results  and questions you might have , will be discussed at next visit -- please make sure to make a follow up appt after testing if reports are abnormal or you have questions.  See fall clinic, _PT, GI and ortho      Ways to Help Prevent Falls at Home    Quick Tips   ? Ask for help if you need it. Most people want to help!   ? Get up slowly after sitting or laying down   ? Wear a medical alert device or keep cell phone in your pocket   ? Use night lights, especially areas near a bathroom   ? Keep the items you use often within reach on a small stool or end table   ? Use an assistive device such as walker or cane, as directed by provider/physical therapy   ? Use a non-slip mat and grab bars in your bathroom. Look for home health sections for best options     Other Areas to Focus On   ? Exercise and nutrition: Regular exercise or taking a falls prevention class are great ways improve strength and balance. Don’t forget to stay hydrated and bring a snack!   ? Medicine side effects: Some medicines can make you sleepy or dizzy, which could cause a fall. Ask your healthcare provider about the side effects your medicines could cause. Be sure to let them know if you take any vitamins or supplements as well.   ? Tripping hazards: Remove items you could trip on, such as loose mats, rugs, cords, and clutter. Wear closed toe shoes with rubber soles.   ? Health and wellness: Get regular checkups with your healthcare provider, plus routine vision and hearing screenings. Talk with your healthcare provider about:   o  Your medicines and the possible side effects - bring them in a bag if that is easier!   o Problems with balance or feeling dizzy   o Ways to promote bone health, such as Vitamin D and calcium supplements   o Questions or concerns about falling     *Ask your healthcare team if you have questions     ©[place], 2022

## 2024-08-21 NOTE — PROGRESS NOTES
"Subjective   Nan Crane is a 74 y.o. female who presents for Follow-up, Hypertension, and Depression.  Patient is here for a 3 months follow up depression - controlled with TX per patient statement   Follow up HTN-checking BP  at home sometimes and is good /per patient statement   Seeing Dr. Granado for DM, last HGBA1C 6.4 06/05/2024    Patient stated the following concerns for today: she fell again 3 or 4 weeks ago,  accidental fall, no injuries or bruises, her needs are bad and couldn't get up.  Stated us having issues with both Veins on her legs,has varicose veins but no symptoms.  Started right shoulder pain, is being more aggravated recently.  Throwing up  for 2 yrs ago, doesn't give her any warning, mostly any time, recently at supper time 2 to 3 x /month, has a lot of postnasal drainage, taking otc medication.  Issues staying asleep's wakes up after 3 or 4 hours of sleep and can't fall back asleep. Taking tylenol pm and melatonin prn            Review of Systems   All other systems reviewed and are negative.      Objective   Physical Exam  Constitutional:       Comments: Varicose veins, no edema, right shoulder pain       /78 (BP Location: Right arm, Patient Position: Sitting, BP Cuff Size: Small adult)   Pulse 82   Temp 36.3 °C (97.3 °F)   Resp 16   Ht 1.575 m (5' 2\")   Wt 64 kg (141 lb)   SpO2 97%   BMI 25.79 kg/m²   CT CHEST W IV CONTRAST     - Impression -  Status post resection of the right lobe. No obvious recurrent tumor  or metastatic disease.     Signed by: Sofi Gomez 1/16/2024 1:10 PM  Dictation workstation:   [id number]  Lab Results   Component Value Date    WBC 6.3 08/19/2024    HGB 12.3 08/19/2024    HCT 39.3 08/19/2024     08/19/2024     08/19/2024     Lab Results   Component Value Date    GLUCOSE 125 (H) 04/30/2024    CALCIUM 10.2 04/30/2024     04/30/2024    K 4.3 04/30/2024    CO2 22 04/30/2024     04/30/2024    BUN 26 (H) 04/30/2024    CREATININE " "1.23 (H) 04/30/2024     Lab Results   Component Value Date    ALT 16 04/30/2024    AST 14 04/30/2024    ALKPHOS 74 04/30/2024    BILITOT 0.5 04/30/2024     Lab Results   Component Value Date    CHOL 146 04/30/2024    CHOL 141 11/10/2022    CHOL 124 02/15/2022     Lab Results   Component Value Date    HDL 59.7 04/30/2024    HDL 52.0 11/10/2022    HDL 39.0 (A) 02/15/2022     Lab Results   Component Value Date    LDLCALC 64 04/30/2024     Lab Results   Component Value Date    TRIG 110 04/30/2024    TRIG 118 11/10/2022    TRIG 198 (H) 02/15/2022     No components found for: \"CHOLHDL\"  Lab Results   Component Value Date    TSH 2.86 04/30/2024       Assessment/Plan   Problem List Items Addressed This Visit       CKD (chronic kidney disease) stage 3, GFR 30-59 ml/min (Multi)    HTN (hypertension)    Vomiting    Relevant Orders    Referral to Gastroenterology    Shoulder pain - Primary    Relevant Orders    Referral to Physical Therapy    Referral to Orthopaedic Surgery    Fall    Relevant Orders    Referral to Physical Therapy    Referral to Falls Clinic     Patient was identified as a fall risk. Risk prevention instructions provided.  "

## 2024-08-29 ENCOUNTER — OFFICE VISIT (OUTPATIENT)
Dept: ORTHOPEDIC SURGERY | Facility: CLINIC | Age: 74
End: 2024-08-29
Payer: COMMERCIAL

## 2024-08-29 DIAGNOSIS — M25.511 RIGHT SHOULDER PAIN, UNSPECIFIED CHRONICITY: ICD-10-CM

## 2024-08-29 DIAGNOSIS — M19.019 GLENOHUMERAL ARTHRITIS: Primary | ICD-10-CM

## 2024-08-29 PROCEDURE — 99213 OFFICE O/P EST LOW 20 MIN: CPT | Performed by: ORTHOPAEDIC SURGERY

## 2024-08-29 PROCEDURE — 3048F LDL-C <100 MG/DL: CPT | Performed by: ORTHOPAEDIC SURGERY

## 2024-08-29 PROCEDURE — 4010F ACE/ARB THERAPY RXD/TAKEN: CPT | Performed by: ORTHOPAEDIC SURGERY

## 2024-08-29 PROCEDURE — 1123F ACP DISCUSS/DSCN MKR DOCD: CPT | Performed by: ORTHOPAEDIC SURGERY

## 2024-08-29 PROCEDURE — 99204 OFFICE O/P NEW MOD 45 MIN: CPT | Performed by: ORTHOPAEDIC SURGERY

## 2024-08-29 PROCEDURE — 1157F ADVNC CARE PLAN IN RCRD: CPT | Performed by: ORTHOPAEDIC SURGERY

## 2024-08-29 PROCEDURE — 20610 DRAIN/INJ JOINT/BURSA W/O US: CPT | Mod: RT | Performed by: ORTHOPAEDIC SURGERY

## 2024-08-29 PROCEDURE — 3062F POS MACROALBUMINURIA REV: CPT | Performed by: ORTHOPAEDIC SURGERY

## 2024-08-29 PROCEDURE — 2500000004 HC RX 250 GENERAL PHARMACY W/ HCPCS (ALT 636 FOR OP/ED): Performed by: ORTHOPAEDIC SURGERY

## 2024-08-29 PROCEDURE — 2500000005 HC RX 250 GENERAL PHARMACY W/O HCPCS: Performed by: ORTHOPAEDIC SURGERY

## 2024-08-29 RX ORDER — BETAMETHASONE SODIUM PHOSPHATE AND BETAMETHASONE ACETATE 3; 3 MG/ML; MG/ML
2 INJECTION, SUSPENSION INTRA-ARTICULAR; INTRALESIONAL; INTRAMUSCULAR; SOFT TISSUE
Status: COMPLETED | OUTPATIENT
Start: 2024-08-29 | End: 2024-08-29

## 2024-08-29 RX ORDER — LIDOCAINE HYDROCHLORIDE 10 MG/ML
5 INJECTION INFILTRATION; PERINEURAL
Status: COMPLETED | OUTPATIENT
Start: 2024-08-29 | End: 2024-08-29

## 2024-08-29 NOTE — PROGRESS NOTES
History of present: Patient with a history of knee replacements done 3 to 4 years ago now new problem right shoulder she has a combination of shoulder and neck pain she is got moderate arthritis seen on x-rays that were done in the spring 2024 she comes in today for evaluation        Past medical history:    The patient's past medical history, family history, social history and review of systems were documented on the patient's medical intake form.  The medical intake form was reviewed and scanned into the electronic medical record for future use.  History is otherwise negative except as stated in the history of present illness.    Physical exam:    General: Alert and oriented to person place and time.  No acute distress and breathing comfortably, pleasant and cooperative with examination.    Head and neck exam: Head is normocephalic atraumatic.    Neck: Supple no visible swelling or deformity.    Cardiovascular: Good perfusion to affected extremities without signs or symptoms of chest pain.    Lungs no audible wheezing or labored breathing on examination.    Abdominal exam: Nondistended nontender    Extremities: The right shoulder was inspected and was found to have no obvious deformity.  There was tenderness to touch over the lateral edges of the shoulder over the rotator cuff insertion.  Active forward flexion 120 degrees, external rotation to 60 degrees, abduction to 50 degrees, and internal rotation to the level of L2.    At this time the shoulder is neurovascular tact and neurosensory intact.  Motor intact C5-T1.  There was no obvious neck pain or radiculopathy noted.  There was no gross instability or adhesive capsulitis symptoms.  There was no evidence of apprehension or apprehension suppression.    Strength was tested in 4 planes with weakness in the supraspinatus strength testing and external rotation position.  There was no strength deficit in internal rotation.  Impingement signs were positive both  supine and standing for impingement test type I and II.  There was mild pain over the bicipital groove with a positive speeds sign    Before aspiration injection the benefits of a cortisone injection including infection, local skin irritation, skin atrophy, calcification, continued pain and discomfort, elevated blood sugar, burning, failure to relieve pain, possible late infection were discussed with the patient.    Postprocedure discomfort can be alleviated with additional medications, ice, elevation, rest over the first 24 hours as recommended.    Patient verbalized understanding and wanted to proceed with the planned procedure.    After informed consent was provided and allergies verified, the patient was positioned appropriately on thel bed.  The right shoulder to be aspirated and injected was prepped and draped in a sterile fashion.  The skin was anesthetized with ethyl chloride spray.  A joint aspiration was to be performed an 18-gauge needle was used otherwise a 22-gauge needle was used to inject the appropriate joint.    Joint injection was performed with a mixture of 5 cc 1% lidocaine plain and 2 cc Celestone Soluspan 6 mg per mL.  The needle was removed and the puncture site closed and sealed with a Band-Aid.  The patient tolerated the procedure well.        Diagnostic studies: Shoulder x-rays show moderate arthritic change right shoulder from March 2024      Impression: Right shoulder arthritis possibly some additional arthritic change of the cervical spine      Plan: Injection right shoulder ice elevate gentle range of motion self-guided activities due to the arthritis just gentle range of motion program is sufficient she was instructed in exercises she will return in 3 weeks now if she gets complete release of her shoulder and neck we will know that the pain is coming from the shoulder if it returns we may talk about a fluoroscopic intra-articular injection and/or MRI of the shoulder    If she gets no  relief from her C-spine from the injection we would then refer her to our spine doctors for evaluation of the cervical spine we will decide how to proceed when she returns in 3 to 4 weeks obviously if she is doing quite well she could even cancel the visit    L Inj/Asp: R subacromial bursa on 8/29/2024 1:18 PM  Indications: pain  Details: 22 G needle, medial approach  Medications: 2 mL betamethasone acet,sod phos 6 mg/mL; 5 mL lidocaine 10 mg/mL (1 %)  Outcome: tolerated well, no immediate complications  Procedure, treatment alternatives, risks and benefits explained, specific risks discussed. Consent was given by the patient. Immediately prior to procedure a time out was called to verify the correct patient, procedure, equipment, support staff and site/side marked as required.

## 2024-09-20 ENCOUNTER — TELEPHONE (OUTPATIENT)
Dept: PRIMARY CARE | Facility: CLINIC | Age: 74
End: 2024-09-20
Payer: COMMERCIAL

## 2024-09-20 DIAGNOSIS — N18.30 STAGE 3 CHRONIC KIDNEY DISEASE, UNSPECIFIED WHETHER STAGE 3A OR 3B CKD (MULTI): Primary | ICD-10-CM

## 2024-09-20 NOTE — TELEPHONE ENCOUNTER
Patient has the following questions:     She had labs done in August- are those good for her to have a CT with contrast or does she need new lab orders?     The PT that was ordered- is this for her Neck and shoulder or for balance?

## 2024-09-26 ENCOUNTER — OFFICE VISIT (OUTPATIENT)
Dept: ORTHOPEDIC SURGERY | Facility: CLINIC | Age: 74
End: 2024-09-26
Payer: COMMERCIAL

## 2024-09-26 DIAGNOSIS — M25.511 RIGHT SHOULDER PAIN, UNSPECIFIED CHRONICITY: ICD-10-CM

## 2024-09-26 DIAGNOSIS — M19.019 GLENOHUMERAL ARTHRITIS: Primary | ICD-10-CM

## 2024-09-26 PROCEDURE — 3062F POS MACROALBUMINURIA REV: CPT | Performed by: ORTHOPAEDIC SURGERY

## 2024-09-26 PROCEDURE — 4010F ACE/ARB THERAPY RXD/TAKEN: CPT | Performed by: ORTHOPAEDIC SURGERY

## 2024-09-26 PROCEDURE — 3048F LDL-C <100 MG/DL: CPT | Performed by: ORTHOPAEDIC SURGERY

## 2024-09-26 PROCEDURE — 99213 OFFICE O/P EST LOW 20 MIN: CPT | Performed by: ORTHOPAEDIC SURGERY

## 2024-09-26 PROCEDURE — 1123F ACP DISCUSS/DSCN MKR DOCD: CPT | Performed by: ORTHOPAEDIC SURGERY

## 2024-09-26 PROCEDURE — 1157F ADVNC CARE PLAN IN RCRD: CPT | Performed by: ORTHOPAEDIC SURGERY

## 2024-09-26 NOTE — PROGRESS NOTES
History of present illness: Patient with a history of shoulder injection she got very good relief at least for the first week or 2 took care of a lot of the pain in the shoulder shoulder blade and even the neck    Unfortunately she was picking up a garbage bag which she kind of flung her through and it aggravated the shoulder since then she has had severe pain she had excellent motion early response was great but now pain return    Physical exam:    General: No acute distress or breathing difficulty or discomfort, pleasant and cooperative with the examination.    Extremities: The right shoulder was inspected and was found to have no obvious deformity.  There was tenderness to touch over the lateral edges of the shoulder over the rotator cuff insertion.  Active forward flexion 110 degrees, external rotation to 50 degrees, abduction to 45 degrees, and internal rotation to the level of L2.    At this time the shoulder is neurovascular tact and neurosensory intact.  Motor intact C5-T1.  There was no obvious neck pain or radiculopathy noted.  There was no gross instability or adhesive capsulitis symptoms.  There was no evidence of apprehension or apprehension suppression.    Strength was tested in 4 planes with weakness in the supraspinatus strength testing and external rotation position.  There was no strength deficit in internal rotation.  Impingement signs were positive both supine and standing for impingement test type I and II.  There was mild pain over the bicipital groove with a positive speeds sign    Diagnostic studies: No new    Impression: Right shoulder rotator cuff tear    Plan: MRI for surgical planning right shoulder we will see her back and recommend treatment it will be interesting to see how much is arthritic and how much is torn we will have to have a decision on how best to proceed with her shoulder

## 2024-09-27 ENCOUNTER — LAB (OUTPATIENT)
Dept: LAB | Facility: LAB | Age: 74
End: 2024-09-27
Payer: COMMERCIAL

## 2024-09-27 DIAGNOSIS — N18.30 STAGE 3 CHRONIC KIDNEY DISEASE, UNSPECIFIED WHETHER STAGE 3A OR 3B CKD (MULTI): ICD-10-CM

## 2024-09-27 DIAGNOSIS — I10 PRIMARY HYPERTENSION: ICD-10-CM

## 2024-09-27 LAB
ANION GAP SERPL CALC-SCNC: 15 MMOL/L (ref 10–20)
BUN SERPL-MCNC: 36 MG/DL (ref 6–23)
CALCIUM SERPL-MCNC: 10.2 MG/DL (ref 8.6–10.3)
CHLORIDE SERPL-SCNC: 107 MMOL/L (ref 98–107)
CO2 SERPL-SCNC: 24 MMOL/L (ref 21–32)
CREAT SERPL-MCNC: 1.39 MG/DL (ref 0.5–1.05)
EGFRCR SERPLBLD CKD-EPI 2021: 40 ML/MIN/1.73M*2
GLUCOSE SERPL-MCNC: 120 MG/DL (ref 74–99)
POTASSIUM SERPL-SCNC: 4.7 MMOL/L (ref 3.5–5.3)
SODIUM SERPL-SCNC: 141 MMOL/L (ref 136–145)
TSH SERPL-ACNC: 3.36 MIU/L (ref 0.44–3.98)

## 2024-09-27 PROCEDURE — 84443 ASSAY THYROID STIM HORMONE: CPT

## 2024-09-27 PROCEDURE — 36415 COLL VENOUS BLD VENIPUNCTURE: CPT

## 2024-09-27 PROCEDURE — 80048 BASIC METABOLIC PNL TOTAL CA: CPT

## 2024-10-01 ENCOUNTER — HOSPITAL ENCOUNTER (OUTPATIENT)
Dept: RADIOLOGY | Facility: HOSPITAL | Age: 74
Discharge: HOME | End: 2024-10-01
Payer: COMMERCIAL

## 2024-10-01 DIAGNOSIS — R11.2 NAUSEA WITH VOMITING, UNSPECIFIED: ICD-10-CM

## 2024-10-01 PROCEDURE — 2550000001 HC RX 255 CONTRASTS: Performed by: INTERNAL MEDICINE

## 2024-10-01 PROCEDURE — 74177 CT ABD & PELVIS W/CONTRAST: CPT | Performed by: RADIOLOGY

## 2024-10-01 PROCEDURE — 74177 CT ABD & PELVIS W/CONTRAST: CPT

## 2024-10-09 ENCOUNTER — HOSPITAL ENCOUNTER (OUTPATIENT)
Dept: RADIOLOGY | Facility: HOSPITAL | Age: 74
Discharge: HOME | End: 2024-10-09
Payer: COMMERCIAL

## 2024-10-09 ENCOUNTER — APPOINTMENT (OUTPATIENT)
Dept: ENDOCRINOLOGY | Facility: CLINIC | Age: 74
End: 2024-10-09
Payer: COMMERCIAL

## 2024-10-09 DIAGNOSIS — R11.0 NAUSEA: ICD-10-CM

## 2024-10-09 PROCEDURE — 78264 GASTRIC EMPTYING IMG STUDY: CPT | Performed by: RADIOLOGY

## 2024-10-09 PROCEDURE — 3430000001 HC RX 343 DIAGNOSTIC RADIOPHARMACEUTICALS: Performed by: INTERNAL MEDICINE

## 2024-10-09 PROCEDURE — 78264 GASTRIC EMPTYING IMG STUDY: CPT

## 2024-10-16 ENCOUNTER — OFFICE VISIT (OUTPATIENT)
Dept: GERIATRIC MEDICINE | Facility: CLINIC | Age: 74
End: 2024-10-16
Payer: COMMERCIAL

## 2024-10-16 ENCOUNTER — TELEMEDICINE (OUTPATIENT)
Dept: PHARMACY | Facility: HOSPITAL | Age: 74
End: 2024-10-16
Payer: COMMERCIAL

## 2024-10-16 ENCOUNTER — APPOINTMENT (OUTPATIENT)
Dept: ENDOCRINOLOGY | Facility: CLINIC | Age: 74
End: 2024-10-16
Payer: COMMERCIAL

## 2024-10-16 ENCOUNTER — DOCUMENTATION (OUTPATIENT)
Dept: PHYSICAL THERAPY | Facility: CLINIC | Age: 74
End: 2024-10-16

## 2024-10-16 ENCOUNTER — OFFICE VISIT (OUTPATIENT)
Dept: OTOLARYNGOLOGY | Facility: CLINIC | Age: 74
End: 2024-10-16
Payer: COMMERCIAL

## 2024-10-16 VITALS
HEART RATE: 102 BPM | HEIGHT: 62 IN | WEIGHT: 143 LBS | TEMPERATURE: 97.6 F | DIASTOLIC BLOOD PRESSURE: 82 MMHG | SYSTOLIC BLOOD PRESSURE: 127 MMHG | BODY MASS INDEX: 26.31 KG/M2

## 2024-10-16 VITALS
WEIGHT: 143 LBS | SYSTOLIC BLOOD PRESSURE: 136 MMHG | DIASTOLIC BLOOD PRESSURE: 60 MMHG | BODY MASS INDEX: 25.34 KG/M2 | HEIGHT: 63 IN

## 2024-10-16 DIAGNOSIS — S02.2XXA CLOSED FRACTURE OF NASAL BONE, INITIAL ENCOUNTER: ICD-10-CM

## 2024-10-16 DIAGNOSIS — Z91.81 AT RISK FOR FALLING: ICD-10-CM

## 2024-10-16 DIAGNOSIS — E11.22 TYPE 2 DIABETES MELLITUS WITH STAGE 3B CHRONIC KIDNEY DISEASE, WITHOUT LONG-TERM CURRENT USE OF INSULIN (MULTI): ICD-10-CM

## 2024-10-16 DIAGNOSIS — I10 PRIMARY HYPERTENSION: ICD-10-CM

## 2024-10-16 DIAGNOSIS — K31.84 GASTROPARESIS: ICD-10-CM

## 2024-10-16 DIAGNOSIS — F41.9 ANXIETY DISORDER, UNSPECIFIED TYPE: ICD-10-CM

## 2024-10-16 DIAGNOSIS — E78.5 HYPERLIPIDEMIA, UNSPECIFIED HYPERLIPIDEMIA TYPE: ICD-10-CM

## 2024-10-16 DIAGNOSIS — H91.93 BILATERAL HEARING LOSS, UNSPECIFIED HEARING LOSS TYPE: ICD-10-CM

## 2024-10-16 DIAGNOSIS — R11.2 NAUSEA AND VOMITING, UNSPECIFIED VOMITING TYPE: ICD-10-CM

## 2024-10-16 DIAGNOSIS — R29.6 FREQUENT FALLS: ICD-10-CM

## 2024-10-16 DIAGNOSIS — G47.00 INSOMNIA, UNSPECIFIED TYPE: ICD-10-CM

## 2024-10-16 DIAGNOSIS — M19.019 LOCALIZED OSTEOARTHROSIS, SHOULDER REGION, UNSPECIFIED LATERALITY: ICD-10-CM

## 2024-10-16 DIAGNOSIS — R26.89 ABNORMALITY OF GAIT DUE TO IMPAIRMENT OF BALANCE: Primary | ICD-10-CM

## 2024-10-16 DIAGNOSIS — R26.89 IMBALANCE: Primary | ICD-10-CM

## 2024-10-16 DIAGNOSIS — R29.6 RECURRENT FALLS: ICD-10-CM

## 2024-10-16 DIAGNOSIS — R35.0 URINARY FREQUENCY: ICD-10-CM

## 2024-10-16 DIAGNOSIS — Z91.81 AT RISK FOR FALLING: Primary | ICD-10-CM

## 2024-10-16 DIAGNOSIS — N18.32 TYPE 2 DIABETES MELLITUS WITH STAGE 3B CHRONIC KIDNEY DISEASE, WITHOUT LONG-TERM CURRENT USE OF INSULIN (MULTI): ICD-10-CM

## 2024-10-16 DIAGNOSIS — E11.9 CONTROLLED TYPE 2 DIABETES MELLITUS WITHOUT COMPLICATION, WITHOUT LONG-TERM CURRENT USE OF INSULIN (MULTI): Primary | ICD-10-CM

## 2024-10-16 DIAGNOSIS — Z79.899 POLYPHARMACY: ICD-10-CM

## 2024-10-16 LAB
POC FINGERSTICK BLOOD GLUCOSE: 179 MG/DL (ref 70–100)
POC HEMOGLOBIN A1C: 6.3 % (ref 4.2–6.5)

## 2024-10-16 PROCEDURE — 4010F ACE/ARB THERAPY RXD/TAKEN: CPT | Performed by: INTERNAL MEDICINE

## 2024-10-16 PROCEDURE — 1157F ADVNC CARE PLAN IN RCRD: CPT | Performed by: NURSE PRACTITIONER

## 2024-10-16 PROCEDURE — 99417 PROLNG OP E/M EACH 15 MIN: CPT | Performed by: INTERNAL MEDICINE

## 2024-10-16 PROCEDURE — 3078F DIAST BP <80 MM HG: CPT | Performed by: NURSE PRACTITIONER

## 2024-10-16 PROCEDURE — 99214 OFFICE O/P EST MOD 30 MIN: CPT | Performed by: NURSE PRACTITIONER

## 2024-10-16 PROCEDURE — 1036F TOBACCO NON-USER: CPT | Performed by: STUDENT IN AN ORGANIZED HEALTH CARE EDUCATION/TRAINING PROGRAM

## 2024-10-16 PROCEDURE — 3075F SYST BP GE 130 - 139MM HG: CPT | Performed by: STUDENT IN AN ORGANIZED HEALTH CARE EDUCATION/TRAINING PROGRAM

## 2024-10-16 PROCEDURE — 1036F TOBACCO NON-USER: CPT | Performed by: INTERNAL MEDICINE

## 2024-10-16 PROCEDURE — 1159F MED LIST DOCD IN RCRD: CPT | Performed by: STUDENT IN AN ORGANIZED HEALTH CARE EDUCATION/TRAINING PROGRAM

## 2024-10-16 PROCEDURE — 3074F SYST BP LT 130 MM HG: CPT | Performed by: NURSE PRACTITIONER

## 2024-10-16 PROCEDURE — 4010F ACE/ARB THERAPY RXD/TAKEN: CPT | Performed by: STUDENT IN AN ORGANIZED HEALTH CARE EDUCATION/TRAINING PROGRAM

## 2024-10-16 PROCEDURE — 99214 OFFICE O/P EST MOD 30 MIN: CPT | Performed by: STUDENT IN AN ORGANIZED HEALTH CARE EDUCATION/TRAINING PROGRAM

## 2024-10-16 PROCEDURE — 3008F BODY MASS INDEX DOCD: CPT | Performed by: STUDENT IN AN ORGANIZED HEALTH CARE EDUCATION/TRAINING PROGRAM

## 2024-10-16 PROCEDURE — 3048F LDL-C <100 MG/DL: CPT | Performed by: INTERNAL MEDICINE

## 2024-10-16 PROCEDURE — 3048F LDL-C <100 MG/DL: CPT | Performed by: STUDENT IN AN ORGANIZED HEALTH CARE EDUCATION/TRAINING PROGRAM

## 2024-10-16 PROCEDURE — 4010F ACE/ARB THERAPY RXD/TAKEN: CPT | Performed by: NURSE PRACTITIONER

## 2024-10-16 PROCEDURE — 3048F LDL-C <100 MG/DL: CPT | Performed by: NURSE PRACTITIONER

## 2024-10-16 PROCEDURE — 3008F BODY MASS INDEX DOCD: CPT | Performed by: NURSE PRACTITIONER

## 2024-10-16 PROCEDURE — 1157F ADVNC CARE PLAN IN RCRD: CPT | Performed by: INTERNAL MEDICINE

## 2024-10-16 PROCEDURE — 1157F ADVNC CARE PLAN IN RCRD: CPT | Performed by: STUDENT IN AN ORGANIZED HEALTH CARE EDUCATION/TRAINING PROGRAM

## 2024-10-16 PROCEDURE — 83036 HEMOGLOBIN GLYCOSYLATED A1C: CPT | Performed by: STUDENT IN AN ORGANIZED HEALTH CARE EDUCATION/TRAINING PROGRAM

## 2024-10-16 PROCEDURE — 1123F ACP DISCUSS/DSCN MKR DOCD: CPT | Performed by: NURSE PRACTITIONER

## 2024-10-16 PROCEDURE — 1126F AMNT PAIN NOTED NONE PRSNT: CPT | Performed by: NURSE PRACTITIONER

## 2024-10-16 PROCEDURE — 82962 GLUCOSE BLOOD TEST: CPT | Performed by: STUDENT IN AN ORGANIZED HEALTH CARE EDUCATION/TRAINING PROGRAM

## 2024-10-16 PROCEDURE — 1123F ACP DISCUSS/DSCN MKR DOCD: CPT | Performed by: INTERNAL MEDICINE

## 2024-10-16 PROCEDURE — 99215 OFFICE O/P EST HI 40 MIN: CPT | Performed by: INTERNAL MEDICINE

## 2024-10-16 PROCEDURE — 1123F ACP DISCUSS/DSCN MKR DOCD: CPT | Performed by: STUDENT IN AN ORGANIZED HEALTH CARE EDUCATION/TRAINING PROGRAM

## 2024-10-16 PROCEDURE — 99204 OFFICE O/P NEW MOD 45 MIN: CPT | Performed by: NURSE PRACTITIONER

## 2024-10-16 PROCEDURE — 3062F POS MACROALBUMINURIA REV: CPT | Performed by: NURSE PRACTITIONER

## 2024-10-16 PROCEDURE — 1036F TOBACCO NON-USER: CPT | Performed by: NURSE PRACTITIONER

## 2024-10-16 PROCEDURE — 3078F DIAST BP <80 MM HG: CPT | Performed by: STUDENT IN AN ORGANIZED HEALTH CARE EDUCATION/TRAINING PROGRAM

## 2024-10-16 PROCEDURE — 3062F POS MACROALBUMINURIA REV: CPT | Performed by: INTERNAL MEDICINE

## 2024-10-16 PROCEDURE — 3062F POS MACROALBUMINURIA REV: CPT | Performed by: STUDENT IN AN ORGANIZED HEALTH CARE EDUCATION/TRAINING PROGRAM

## 2024-10-16 PROCEDURE — 99205 OFFICE O/P NEW HI 60 MIN: CPT | Performed by: INTERNAL MEDICINE

## 2024-10-16 RX ORDER — POLYETHYLENE GLYCOL 3350 17 G/17G
17 POWDER, FOR SOLUTION ORAL DAILY
COMMUNITY

## 2024-10-16 RX ORDER — ACETAMINOPHEN AND DIPHENHYDRAMINE HYDROCHLORIDE 500; 25 MG/1; MG/1
TABLET, FILM COATED ORAL
COMMUNITY
End: 2024-10-16 | Stop reason: WASHOUT

## 2024-10-16 RX ORDER — ACETAMINOPHEN 500 MG
TABLET ORAL
COMMUNITY

## 2024-10-16 RX ORDER — FLASH GLUCOSE SENSOR
KIT MISCELLANEOUS
Qty: 6 EACH | Refills: 1 | Status: SHIPPED | OUTPATIENT
Start: 2024-10-16

## 2024-10-16 RX ORDER — METFORMIN HYDROCHLORIDE 500 MG/1
500 TABLET ORAL EVERY 12 HOURS
Qty: 180 TABLET | Refills: 1 | Status: SHIPPED | OUTPATIENT
Start: 2024-10-16 | End: 2025-10-16

## 2024-10-16 RX ORDER — GLIPIZIDE 5 MG/1
5 TABLET ORAL
Qty: 180 TABLET | Refills: 1 | Status: SHIPPED | OUTPATIENT
Start: 2024-10-16 | End: 2025-10-16

## 2024-10-16 RX ORDER — DICLOFENAC SODIUM 10 MG/G
4 GEL TOPICAL 3 TIMES DAILY PRN
Start: 2024-10-16

## 2024-10-16 SDOH — ECONOMIC STABILITY: FOOD INSECURITY: WITHIN THE PAST 12 MONTHS, THE FOOD YOU BOUGHT JUST DIDN'T LAST AND YOU DIDN'T HAVE MONEY TO GET MORE.: NEVER TRUE

## 2024-10-16 SDOH — ECONOMIC STABILITY: FOOD INSECURITY: WITHIN THE PAST 12 MONTHS, YOU WORRIED THAT YOUR FOOD WOULD RUN OUT BEFORE YOU GOT MONEY TO BUY MORE.: NEVER TRUE

## 2024-10-16 ASSESSMENT — LIFESTYLE VARIABLES
HOW OFTEN DO YOU HAVE SIX OR MORE DRINKS ON ONE OCCASION: NEVER
AUDIT-C TOTAL SCORE: 0
HOW OFTEN DO YOU HAVE A DRINK CONTAINING ALCOHOL: NEVER
HOW MANY STANDARD DRINKS CONTAINING ALCOHOL DO YOU HAVE ON A TYPICAL DAY: PATIENT DOES NOT DRINK
SKIP TO QUESTIONS 9-10: 1

## 2024-10-16 ASSESSMENT — ENCOUNTER SYMPTOMS
DEPRESSION: 0
OCCASIONAL FEELINGS OF UNSTEADINESS: 1
LOSS OF SENSATION IN FEET: 0

## 2024-10-16 ASSESSMENT — COLUMBIA-SUICIDE SEVERITY RATING SCALE - C-SSRS
6. HAVE YOU EVER DONE ANYTHING, STARTED TO DO ANYTHING, OR PREPARED TO DO ANYTHING TO END YOUR LIFE?: NO
1. IN THE PAST MONTH, HAVE YOU WISHED YOU WERE DEAD OR WISHED YOU COULD GO TO SLEEP AND NOT WAKE UP?: NO
2. HAVE YOU ACTUALLY HAD ANY THOUGHTS OF KILLING YOURSELF?: NO

## 2024-10-16 ASSESSMENT — PAIN SCALES - GENERAL: PAINLEVEL_OUTOF10: 0-NO PAIN

## 2024-10-16 NOTE — PROGRESS NOTES
Multidisciplinary [facility]  Comprehensive Medication Review    Nan Crane is a 74 y.o. female who was referred to the ambulatory Clinical Pharmacy Team for evaluation at the [facility].    Chronic Conditions of Note  Acute adjustment disorder with anxiety, acute situational stress disorder, allergic rhinitis, arthritis of hand, BPV, cervical spine sprain, CKD, depression, DM2, dizziness, dysfunction of both eustachian tubes, fatigue, HTN, HLD, OA, post-nasal drip, s/p total knee replacement, TIA, urge incontinence, bone deformity, transient visual loss of left eye, osteopenia    History of Falls  Chart Review  Has patient fallen? yes  3/20/2024- Very windy day, went to place a box of flowers in car, wind took flowers, and she ran into the field to catch flowers. Ground uneven, and patient fell on asphault.   6/2024- Going in to [employer], got out of car, dropped coupon, bent over, and fell down. Could not kneel to get up. Loss of balance.     Patient Interview  Has fallen in the last 6 months  Started falling in March 2024  Afraid of falling  Falls mostly occur outdoors  Falls typically occur in the day  Rarely loses balance  Injuries from falls: cuts with stitches, bruises, scrapes, broken nose, head injury- has led to ER visit  Cannot get up from fall without assistance  Not currently using assistive walking device  Lives alone, stairs inside home  Complains of fatigue, lightheadedness, joint pain, trouble sleeping, constipation, decreased appetite, weight loss  Has episodes of dizziness- diagnosis of BPV    Current Medication/Environmental Allergies  Allergies   Allergen Reactions    Albuterol Other     Sleeplessness    Azatadine Other    Buspirone Hives    Codeine Other     Vomiting     Other Other and Rash    Quinapril Cough    Pollen Extracts Itching     Runny nose, cough    Amoxicillin Rash    Azatadine-Pseudoephedrine Rash    Doxycycline Rash    Erythromycin Rash    Sulfa (Sulfonamide Antibiotics)  Rash    Sulfamethoxazole-Trimethoprim Rash       Current Medications   Current Outpatient Medications on File Prior to Visit   Medication Sig Dispense Refill    aspirin 81 mg EC tablet Aspirin EC 81 MG Oral Tablet Delayed Release   Refills: 0        Start : 3-Sep-2019   Active      blood sugar diagnostic (Accu-Chek Guide test strips) strip 1 strip 1 (one) time each day.      buPROPion SR (Wellbutrin SR) 200 mg 12 hr tablet TAKE 1 TABLET TWICE A DAY. DO NOT CRUSH, CHEW, OR     SPLIT 180 tablet 1    calcium carbonate-vitamin D3 600 mg-5 mcg (200 unit) tablet Calcium 600-200 MG-UNIT TABS   Refills: 0        Start : 3-Sep-2019   Active      chlorthalidone (Hygroton) 25 mg tablet Take 1 tablet (25 mg) by mouth once daily. 90 tablet 3    cholecalciferol (Vitamin D-3) 50 mcg (2,000 unit) capsule Vitamin D3 50 MCG (2000 UT) Oral Capsule   Refills: 0        Start : 3-Sep-2019   Active      coenzyme Q-10 200 mg capsule CoQ10 200 MG Oral Capsule   Refills: 0        Start : 3-Sep-2019   Active      cyanocobalamin, vitamin B-12, (Vitamin B-12) 1,000 mcg tablet extended release Take 1 tablet (1,000 mcg) by mouth once daily.      diclofenac sodium (Voltaren) 1 % gel gel Apply 1 Application topically 4 times a day. 60 g 3    dulaglutide (Trulicity) 0.75 mg/0.5 mL pen injector INJECT 0.75 MG UNDER THE SKIN 1 (ONE) TIME PER WEEK. 2 mL 3    empagliflozin (Jardiance) 25 mg Take 1 tablet (25 mg) by mouth once daily. 90 tablet 0    flash glucose sensor kit (FreeStyle Jessica 14 Day Sensor) kit Change sensor every 14 days 6 each 1    fluticasone (Flonase) 50 mcg/actuation nasal spray ADMINISTER 1 SPRAY INTO EACH NOSTRIL IF NEEDED (AS NEEDED FOR ALLERGIES). 32 mL 1    lancets misc Accu-Chek FastClix Lancets; Use one daily      loratadine (CLARITIN ORAL)       losartan (Cozaar) 100 mg tablet TAKE 1 TABLET ONCE DAILY 90 tablet 3    magnesium gluconate (Magonate) 27.5 mg magne- sium (500 mg) tablet Magnesium 500 MG TABS   Refills: 0         Start : 3-Sep-2019   Active      metFORMIN (Glucophage) 1,000 mg tablet Take 1 tablet (1,000 mg) by mouth every 12 hours. 180 tablet 3    omeprazole (PriLOSEC) 20 mg DR capsule Take 1 capsule (20 mg) by mouth once daily. 90 capsule 3    oxybutynin XL (Ditropan-XL) 15 mg 24 hr tablet TAKE 1 TABLET ONCE DAILY.  DO NOT CRUSH, CHEW, OR     SPLIT. 90 tablet 3    rosuvastatin (Crestor) 10 mg tablet TAKE 1 TABLET ONCE DAILY ATBEDTIME 90 tablet 3    spironolactone (Aldactone) 25 mg tablet TAKE 1 TABLET ONCE DAILY 90 tablet 2     No current facility-administered medications on file prior to visit.      Medication List Analysis  Total number of medications/day (OTC and Rx):   Psychoactive medications (antipsychotics, sedatives, hypnotics, anxiolytics, ADTs):  Wellbutrin  mg 12 hr tab- 1 tab BID  Oxybutynin XL 15 mg daily  loratadine    Other medications that can contribute to fall risk:  Chlorthalidone 25 mg daily  Spironolactone 25 mg daily  Losartan 100 mg daily    PDMP Review  N/A    Adverse  Medication interactions: None    Renal/Hepatic Dosing  Current GFR (reported): 40 mL/min/1.73 m2 as of 24  Current CrCl (calculated): 31.7 mL/min as of 24 labs  Are all medications dosed appropriately per current renal/hepatic function? No  Metformin- per current GFR, reduce to max of 500 mg BID  Bupropion- although no renal dosing required, consider reducing to max 150 mg daily for CrCl 15-60    Medication Efficacy  B.4% A1c as of 24  Lipids: LDL 64 mg/dL and triglycerides 110 mg/dL as of 24  Bleed risk: No anticoagulant or antiplatelet therapy excepting aspirin 81 mg daily  Blood pressure: Appears in range, typical 120s-130s/60s-80s    Laboratory Results  Lab Results   Component Value Date    BILITOT 0.5 2024    CALCIUM 10.2 2024    CO2 24 2024     2024    CREATININE 1.39 (H) 2024    GLUCOSE 120 (H) 2024    ALKPHOS 74 2024    K 4.7 2024    PROT 6.7  04/30/2024     09/27/2024    AST 14 04/30/2024    ALT 16 04/30/2024    BUN 36 (H) 09/27/2024    ANIONGAP 15 09/27/2024    MG 1.71 04/30/2024    ALBUMIN 4.1 04/30/2024    GFRF 42 (A) 08/23/2023    EGFR 40 (L) 09/27/2024     Lab Results   Component Value Date    TRIG 110 04/30/2024    CHOL 146 04/30/2024    LDLCALC 64 04/30/2024    HDL 59.7 04/30/2024     Lab Results   Component Value Date    HGBA1C 6.4 06/05/2024    HGBA1C 6.3 02/05/2024    HGBA1C 6.5 06/26/2023    HGBA1C 6.5 06/26/2023       Lab Results   Component Value Date    [id number] 770 04/30/2024      Lab Results   Component Value Date    VITD25 41 04/30/2024       ASCVD Risk  The 10-year ASCVD risk score (Dorene LUIS, et al., 2019) is: 26%    Values used to calculate the score:      Age: 74 years      Sex: Female      Is Non- : No      Diabetic: Yes      Tobacco smoker: No      Systolic Blood Pressure: 120 mmHg      Is BP treated: Yes      HDL Cholesterol: 59.7 mg/dL      Total Cholesterol: 146 mg/dL    Bone Health Normal  === 02/22/23 ===    DEXA BONE DENSITY       Assessment/Plan   Problem List Items Addressed This Visit    None    General Patient Discussion and Findings  Had flat tire on the way to the appointment this morning!  Bupropion SR 12 hr 200 mg BID  Any previous lower doses trialed  Indication- depression  Cannot tell any difference in effectiveness despite increase in dosing  GERD  Strong indication? Yes  Omeprazole helpful? Yes  Sleep  4 hours per night  Cannot fall back asleep after  Melatonin not helping   Bladder Control  Prior to oxybutynin, frequently using restroom (5 times daily)  After oxybutynin, maybe 3-4  Diabetes  A1c 6.4%  Per patient, average glucose 112 mg/dL   No issues with hypoglycemia  Tolerating Trulicity  New diagnosis of gastroparesis as of yesterday. Episodes of vomitting for 3 years- started right after 's death.  Cholesterol  Muscle pain with rosuvastatin 10 mg daily?  Hands  trembling    Comments/Recommendations to PCP  Continue all meds under the continuation of care with the referring provider and clinical pharmacy team  Diabetes  Metformin IR- reduce to maximum of 500 mg BID per renal function  STOP Trulicity due to recent diagnosis of gastroparesis. Do not recommend trialing any other GLP-1 or GIP/GLP-1 in its place.   Recommend decreasing Wellbutrin to 1 tablet once daily due to no additional benefit perceived when moving from every day to BID dosing.     [facility] Follow-up  Multidisciplinary based on patient's needs. Please see geriatric's note for final summary and multidisciplinary recommendations that were provided to the patient. Patient has contact information to call with any medication questions or concerns.    Sully Wheeler, Caterina     Verbal consent to manage patient's drug therapy was obtained from . They were informed they may decline to participate or withdraw from participation in pharmacy services at any time.
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
## 2024-10-16 NOTE — PROGRESS NOTES
Subjective   Patient ID: Nan Crane is a 74 y.o. female who presents for No chief complaint on file..    HPI    Nan Crane  is a 74 y.o. year-old female presenting at [facility].  She is accompanied by her sister.   Her first fall was 3/20/2024. She dropped something due to the wind, went to eulogio her items and the ground was uneven and fall. She sustained a nasal fracture. Denies change in the appearance of her nose (other than from the laceration) and denies nasal obstruction.   Most recent fall was June 2024. Was walking into a restaurants, dropped her coupon, went to pick it up but lost her balance and fell.     No dizziness/vertigo before the falls. She feels lightheaded sometimes. After walking a lot she can feel lightheaded. If she turns her head quick she can get lightheaded.  History of BPPV in her chart, but patient not sure.     She is having issues with her right shoulder.  Bilateral knee replacements. No hip replacements.    Denies headaches, blurry vision, and double vision.   Bright lights and loud sounds do not make the patient dizzy. Denies pressure induced dizziness. Denies autophony. Denies positional vertigo.     Denies hearing loss. No hearing aids.   She had cataract and lasik surgery. Wears reading glasses.    Lives alone in a house. One step into the house, has a railing.  Basement stairs, has a rail. No grab bars. Step into the shower.  Does not use a cane or a walker recently. Has in the past.  She has a fear of falling. She feels off balance most of the time.   No PT for balance.   She does drive, but does not like to. Her sister drove today.   She does have some concern for her memory.     Patient Active Problem List   Diagnosis    Abnormal blood chemistry    Acute adjustment disorder with anxiety    Adult situational stress disorder    Allergic rhinitis    Arthritis of hand    BPV (benign positional vertigo)    Cervical spine pain    Change in vision    Chronic pain of toe  of left foot    Chronic pain of toe of right foot    CKD (chronic kidney disease) stage 3, GFR 30-59 ml/min (Multi)    Constipation    Depression, major, single episode, moderate (Multi)    Diabetes mellitus type II, controlled, with no complications (Multi)    Dizziness    Dry eye syndrome    Dry mouth not due to sicca syndrome    Dysfunction of both eustachian tubes    Effusion of right knee    Fatigue    Fibroid uterus    Fullness of neck    Fungal nail infection    Gastro-esophageal reflux disease with esophagitis    Hemianopia of left eye    HTN (hypertension)    Hypercalcemia    Hyperlipidemia    Hypokalemia    Knee pain    Lung nodule    Malignant neoplasm of upper lobe of right lung (Multi)    Muscle spasm    Nasal valve collapse    Vomiting    Neuroma of foot    Nevus    Nocturnal leg cramps    OA (osteoarthritis) of knee    Osteoarthritis, localized, shoulder    Posterior capsular opacification non visually significant of left eye    Posterior capsular opacification non visually significant of right eye    Post-nasal drip    Pseudophakia of left eye    Pseudophakia of right eye    Right knee pain    S/P total knee replacement    Sciatica    Seborrheic keratosis    Shoulder pain    Shoulder weakness    Sinobronchitis    Stiffness of cervical spine    Stiffness of right knee, not elsewhere classified    Strain of patellar tendon, sequela    Stress at home    Stye    TIA (transient ischemic attack)    Toe pain    Urge incontinence of urine    UTI (urinary tract infection)    Vaginitis due to Candida    Vitamin D deficiency    Vulvar cyst    Xerosis of skin    Bone deformity    Controlled diabetes mellitus with diabetic nephropathy    Dyshidrosis    Glaucoma suspect of both eyes    Neck pain    Plantar fasciitis    Pre-ulcerative corn or callous    Proteinuria due to type 2 diabetes mellitus (Multi)    Transient visual loss of left eye    Annual physical exam    Diabetic hypoglycemia (Multi)    Eustachian tube  dysfunction    Status post lobectomy of lung    Osteopenia of neck of femur    Primary insomnia    Medicare annual wellness visit, subsequent    Sebaceous cyst of labia    Closed head injury    Facial laceration    Fall    History of diabetes mellitus    History of herpes zoster    Malignant neuroendocrine neoplasm    Open fracture of nasal bone    Hospital discharge follow-up     Past Surgical History:   Procedure Laterality Date    CATARACT EXTRACTION Bilateral     LASIK Bilateral     OTHER SURGICAL HISTORY  2012    Electrocardiogram    OTHER SURGICAL HISTORY  10/09/2019    Knee surgery    OTHER SURGICAL HISTORY  10/09/2019    Shoulder surgery    OTHER SURGICAL HISTORY  11/15/2019     section    OTHER SURGICAL HISTORY  2016    Adult Caregiver Appeared To Understand Counseling    OTHER SURGICAL HISTORY  2022    Lung surgery    OTHER SURGICAL HISTORY  2019    Corneal lasik    OTHER SURGICAL HISTORY  2019    Cataract surgery     Review of Systems    All other systems have been reviewed and are negative for complaints except for those mentioned in history of present illness, past medical history and problem list.    Objective   Physical Exam    Constitutional: No fever, chills, weight loss or weight gain  General appearance: Appears well, well-nourished, well groomed. No acute distress.    Communication: Normal communication    Psychiatric: Oriented to person, place and time. Normal mood and affect.    Neurologic: Cranial nerves II-XII grossly intact and symmetric bilaterally.    Head and Face:  Head: Atraumatic with no masses, lesions or scarring.  Face: Normal symmetry. No scars or deformities.  TMJ: Normal, no trismus.    Eyes: Conjunctiva not edematous or erythematous. PERRLA    Right Ear: External inspection of ear with no deformity, scars, or masses. EAC is clear.  TM is intact with no sign of infection, effusion, or retraction.  No perforation seen.     Left Ear: External  "inspection of ear with no deformity, scars, or masses. EAC is clear.  TM is intact with no sign of infection, effusion, or retraction.  No perforation seen.     Nose: External inspection of nose: No nasal lesions, lacerations or scars. Anterior rhinoscopy with limited visualization past the inferior turbinates. No tenderness on frontal or maxillary sinus palpation.    Oral Cavity/Mouth: Oral cavity and oropharynx mucosa moist and pink. No lesions or masses. Dentition normal. Tonsils appear normal. Uvula is midline. Tongue with no masses or lesions. Tongue with good mobility. The oropharynx is clear.    Neck: Normal appearing, symmetric, trachea midline.     Cardiovascular: Examination of peripheral vascular system shows no clubbing or cyanosis.    Respiratory: No respiratory distress increased work of breathing. Inspection of the chest with symmetric chest expansion and normal respiratory effort.    Skin: No head and neck rashes.    Lymph nodes: No adenopathy.     On vestibular exam, oculomotor function assessment shows abnormal ocular pursuits and saccades. There is no spontaneous nystagmus. Head Thrust test is positive bilaterally.  Postural testing performed. Romberg is positive for obvious weakness/sway. Patient has bilateral tremors in the hands.     Diagnostic Results   Reviewed previous PCP note. Per PCP note:  On 8/21/2024: \"Patient stated the following concerns for today: she fell again 3 or 4 weeks ago, accidental fall, no injuries or bruises, her needs are bad and couldn't get up. \"    Reviewed recent ED Note:  3/20/2024: 74-year-old female who presents to the emergency department after mechanical ground-level fall. States that she was out for a walk in her neighborhood, was going downhill, when a gato of wind caught her and thrust her forward, she fell and struck her face on the pavement. No loss of consciousness, no nausea, no vomiting, no vision changes. Additionally complaining of a cut inside of her " lip, some right-sided shoulder pain, and nosebleed. Bleeding is well-controlled at this time   Medical Decision Making  CTs of the head, C-spine, and face obtained as well as an x-ray of the shoulder.  Head CT showed no depressible fractures or intracranial bleeding.  CT of the C-spine is intact.  Facial CT shows a small nondisplaced fracture of the base of the nasal bone.  Noncomplicated nasal bone fracture not requiring antibiotics at this time.  Shoulder x-ray unremarkable.  Stellate laceration repaired under local anesthesia, with a single stitch and good alignment of closure.  Discharged in stable condition with instructions to follow-up outpatient with PCP.     Imaging from 3/20/2024:  Reviewed CT maxillofacial scan with Dr. Gilmore. Had some thickening of the bone in the frontal sinus-appear benign. Fracture of the left nasal bone.   IMPRESSION:  CT HEAD:  1. No acute intracranial abnormality or calvarial fracture.  2. Small right frontal scalp hematoma.    Assessment/Plan   Diagnoses and all orders for this visit:  Imbalance  Frequent falls  -     Referral to Physical Therapy; Future  Bilateral hearing loss, unspecified hearing loss type  -     Referral to Audiology; Future  Closed fracture of nasal bone, initial encounter    Patient was evaluated today by myself, Pharmacy, Geriatrics, and Physical Therapy. Based on an extensive vestibular exam, I do feel feel that the patient may have a peripheral vestibular disorder. Patient was thoroughly evaluated for BPPV, vestibular neuritis/labyrinthitis, Menieres Disease, and SCCD. The patient's plan was discussed with myself, pharmacy, geriatrics, and physical therapy. From an ENT perspective, I recommend obtaining an audiogram and referral to vestibular therapy to evaluate/manage BPPV and strengthen her vestibular system. Refer to Dr. Clifford's clinic note for further recommendations. All questions answered to patient satisfaction.     30 minutes was spent on this  patient´s visit. More than 50% of that time was spent in counseling regarding the possible etiologies, test results, treatment options and coordinating care.       AIMEE Reyes 10/16/24 8:00 AM

## 2024-10-16 NOTE — PROGRESS NOTES
"Subjective   Patient ID: Nan Crane is a 74 y.o. female who presents for Diabetes ( Nan Crane is a 74 y.o. female who presents for Diabetes 2 (Dx dm: 10+ years /PCP: [facility] /Podiatry: does not see one /Eye exam: yearly; /Patient testing glucose daily with freestyle nory reader (original). Due to fluctuating blood glucose and hypoglycemia., Compliant  with diabetic regime.  Pt is adherent and benefiting  from CGM treatment plan) Forgot to bring nreader to appt)   Lab Results   Component Value Date    HGBA1C 6.3 10/16/2024      HPI    The patient is a known diabetic for 10 years presents for follow up  In office hba1c is 6.3%  [facility]      freestyle nory downloads not available as she forgot her reader today       current regimen   metformin 1 gm BID   off Glipizide  Jardiance 25 mg    Trulclity 0.75 mg weekly , was Byduren 2 mg ( has difficulty opening and inject )  Review of Systems    Objective   Physical Exam Visit Vitals  /60   Ht 1.6 m (5' 3\")   Wt 64.9 kg (143 lb)   BMI 25.33 kg/m²   OB Status Postmenopausal   Smoking Status Never   BSA 1.7 m²        Assessment/Plan         - Well controlled DM2 with hba1c of 6.3 , on oral agents and GLP1. However given recenet gastric emptying study and nausea and vomoting we will hold off Glp1.   - DM nephropathy with urine albumin/creat  now wnl  on ARB and SGLT2  - HTN controlled on chlorthalidone , Noravsc and ARB  - HLD controlled LDL  ,on Rosuvastatin 10 mg   - BPV , no recent episodes.  - Newly diagnosed Carcinoid neuroendocrine tumor s/p lobectomy      plan :  - hold off Trulclity 0.75 mg to eveluate for improvement in GI issues  ( Had difficulty using Byduren pen).   -reduce  metformin to 500 mg bID given worsening kidney function   - continue Jardiance at current dose.   - Advised to adhere to a Diabetic low carb diet.     RTC in 3-4 motnhs            "

## 2024-10-16 NOTE — PROGRESS NOTES
Physical Therapy    Physical Therapy [facility] Assessment    Nan Crane was seen today for a physical therapy balance screen as part of a multidisciplinary assessment at the falls clinic. Nan Crane was agreeable to participate.     Subjective    General:    Fall History:    -Have you fallen? Yes  2 recent.  First in March when she tripped, another in June when she fell bending over to  object.   -Do you feel unsteady? Yes  -Do you worry about falling? Yes    Red Flags:  Nan Crane confirms: intermittent dizziness  Denies: numbness, tingling, diplopia, drop attacks, dysarthria, and dysphagia.     Previous functional level: IND    Living Environment: multi-level house. Lives alone  Stairs to enter home: Yes  Rail present? Yes  Stairs within home:  Yes   Rail present? Yes    Family support: Yes    Current Level of Function:   IND with  most ADL's and IADL's per patient report  Assistance required with: opening jars  Driving: No  Grab bars in bathroom: No  Shower: 1 step to enter shower  Shower chair/seat: No  AD/Equipment: quad cane at home but does not use      Objective     Functional Assessments:    Transfers: sit to stand with B UE, able to stand without UE but with LE compensation, increased time  Gait: slow raul, decreased step length, en bloc turns,   Posture: forward head and rounded shoudlers    Outcome Measures    Timed Up & Go: 17 sec. An older adult who takes >= 12 sec to complete the TUG is at risk for falling.     30 Sec Chair Stand:  5 Rises. A below average score indicates risk for falls.   Chair Stand Below Average Scores   Age Men Women   60-64 <14 <12   65-69 <12 <11   70-74 <12 <10   75-79 <11 <10   80-84 <10 <9   85-89 <8 <8   90-94 <7 <4      Single leg Balance Test   Single leg stand: 2 sec. Participants unable to perform one-leg stand for at least 5 seconds are increased risk for injurious fall    Tandem Stance  Tandem Stance: 10 sec.   An older adult who cannot hold the  tandem stand for at least 10 seconds is at increased risk of falling    Modified Clinical Test of Sensory Interaction in Balance:      Eyes Open on land = 30 seconds  Eyes Closed on land = 30 seconds -Failure to maintain balance with eye closed on firm surface indicates visually dependent.   Eyes Open on airex = 30 seconds - Failure to maintain balance on foam surface indicate that visual and/or vestibular system is not be being used to maintain balance.    Eyes Closed on airex = 7 seconds - Inability to maintain standing on foam with eyes closed predicted future multiple falls  Total: 97/120 = scores < 120 indicate increased risk for falls    5x Sit to Stand: 27 seconds  Community dwelling older adults Cutoff scores:  =12 seconds identifies the need to further assess for falls  >15 seconds= risk of fall    Based on Nan Crane's performance on the above outcome measures, she is at increased risk for falls. Nan demonstrates impairments in balance deficits, gait deficits, and decreased functional mobility.  Nan Crane would benefit from referral to physical therapy.    Plan      PT Recommendations:    Recommend referral for physical therapy for comprehensive evaluation of balance and gait. and physical therapy for comprehensive vestibular evaluation to address dizziness.     Patient was instructed to follow up with this physical therapist as needed.     Jemima Florence PT, DPT, NCS  Board-Certified Neurologic Clinical Specialist

## 2024-10-16 NOTE — PATIENT INSTRUCTIONS
[facility] Recommendations  We have identified the following risks related to falls after your multidisciplinary evaluation and recommend the following interventions.    1. Medications:   - the oxybutynin greatly increases risk for falls              - recommend having your primary doctor switch this to either mirabegron or trospium (for urinary frequency)             - Avoid the tylenol PM. This increases risk for falls               - your blood pressure may be overcontrolled. This could be contributing to your lightheadedness             - recommend talking to your primary doctor about decreasing the losartan to 50mg or 75mg daily    2. Gait, Strength and Balance:    You have demonstrated deficits in balance              Recommend further vestibular PT evaluation and management    3.         Have audiology testing to evaluate your hearing     4.           For your diabetes                 - We recommend STOPPING TRULICITY. It could be contributing to your vomiting and weight loss             - the max dose of metformin for your kidney function is 500mg twice daily                   - we recommend a goal A1c of less than 8.0    5. Foot wear:                   Please avoid walking barefoot or with socks only   Avoid flip flops, sandals, shoes with heels   Get shoes with good ankle support and rubber soles      6. For pain              - recommend taking tylenol extra strength 500mg               - take 2 tabs daily in the morning              - can take additional 2 tabs in afternoon and evening              - can use voltaren gel on your neck and shoulder - 2 grams, every 8 hours as needed    7.    Recommend decreasing bupropion (wellbutrin) to once daily in the morning  - monitor for changes in sleep and mood  - if no worsening in mood or if impovement in mood on lower dose, then recommend weaning off

## 2024-10-17 ENCOUNTER — TELEPHONE (OUTPATIENT)
Dept: PRIMARY CARE | Facility: CLINIC | Age: 74
End: 2024-10-17
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
## 2024-10-17 DIAGNOSIS — R26.81 UNSTEADY GAIT: ICD-10-CM

## 2024-10-21 ENCOUNTER — HOSPITAL ENCOUNTER (OUTPATIENT)
Dept: RADIOLOGY | Facility: CLINIC | Age: 74
Discharge: HOME | End: 2024-10-21
Payer: COMMERCIAL

## 2024-10-21 DIAGNOSIS — M25.511 RIGHT SHOULDER PAIN, UNSPECIFIED CHRONICITY: ICD-10-CM

## 2024-10-21 PROCEDURE — 73221 MRI JOINT UPR EXTREM W/O DYE: CPT | Mod: RIGHT SIDE | Performed by: RADIOLOGY

## 2024-10-21 PROCEDURE — 73221 MRI JOINT UPR EXTREM W/O DYE: CPT | Mod: RT

## 2024-10-23 ENCOUNTER — TELEPHONE (OUTPATIENT)
Dept: ENDOCRINOLOGY | Facility: CLINIC | Age: 74
End: 2024-10-23
Payer: COMMERCIAL

## 2024-10-23 NOTE — TELEPHONE ENCOUNTER
Nan had an appointment on 10/16/24  She was put on Glipizide 5mg and Metformin 500mg her readings were 10/21/24 50 at 710 am 65 5pm  10/22/24 59 at 512 pm and 53 at 12 midnight on 10/23/24 She is asking if her sugars are suppose to be that low    She is also taking Jardiance 25mg.  Doctor Loy never told her if she was to continue on that medication as well.

## 2024-10-24 ENCOUNTER — OFFICE VISIT (OUTPATIENT)
Dept: ORTHOPEDIC SURGERY | Facility: CLINIC | Age: 74
End: 2024-10-24
Payer: COMMERCIAL

## 2024-10-24 DIAGNOSIS — M19.019 GLENOHUMERAL ARTHRITIS: Primary | ICD-10-CM

## 2024-10-24 PROCEDURE — 3062F POS MACROALBUMINURIA REV: CPT | Performed by: ORTHOPAEDIC SURGERY

## 2024-10-24 PROCEDURE — 99213 OFFICE O/P EST LOW 20 MIN: CPT | Performed by: ORTHOPAEDIC SURGERY

## 2024-10-24 PROCEDURE — 4010F ACE/ARB THERAPY RXD/TAKEN: CPT | Performed by: ORTHOPAEDIC SURGERY

## 2024-10-24 PROCEDURE — 1157F ADVNC CARE PLAN IN RCRD: CPT | Performed by: ORTHOPAEDIC SURGERY

## 2024-10-24 PROCEDURE — 3048F LDL-C <100 MG/DL: CPT | Performed by: ORTHOPAEDIC SURGERY

## 2024-10-24 PROCEDURE — 1123F ACP DISCUSS/DSCN MKR DOCD: CPT | Performed by: ORTHOPAEDIC SURGERY

## 2024-10-24 NOTE — PROGRESS NOTES
History of present illness: Patient here evaluation right shoulder mild arthritic change follows up after MRI cortisone shot injection and evaluate    Physical exam:    General: No acute distress or breathing difficulty or discomfort, pleasant and cooperative with the examination.    Extremities: The right shoulder was inspected and was found to have no obvious deformity.  There was tenderness to touch over the lateral edges of the shoulder over the rotator cuff insertion.  Active forward flexion 110 degrees, external rotation to 50 degrees, abduction to 45 degrees, and internal rotation to the level of L2.    At this time the shoulder is neurovascular tact and neurosensory intact.  Motor intact C5-T1.  There was no obvious neck pain or radiculopathy noted.  There was no gross instability or adhesive capsulitis symptoms.  There was no evidence of apprehension or apprehension suppression.    Strength was tested in 4 planes with weakness in the supraspinatus strength testing and external rotation position.  There was no strength deficit in internal rotation.  Impingement signs were positive both supine and standing for impingement test type I and II.  There was mild pain over the bicipital groove with a positive speeds sign    Diagnostic studies: MRI shows high-grade cuff tearing muscle atrophy including both supra and infraspinatus she has elevation of the humeral head    Impression: Right shoulder early rotator cuff arthropathy    Plan: Continue to manage her pain with ice local modalities gentle range of motion program there is no time urgency    She is not a candidate for surgical repair    I will see her back 3 months to 4 months repeat injection or discuss reverse total shoulder replacement this winter in 2025 based on her pain pattern and functional use of the right arm

## 2024-10-28 ENCOUNTER — LAB (OUTPATIENT)
Dept: LAB | Facility: LAB | Age: 74
End: 2024-10-28
Payer: COMMERCIAL

## 2024-10-30 ENCOUNTER — APPOINTMENT (OUTPATIENT)
Dept: PRIMARY CARE | Facility: CLINIC | Age: 74
End: 2024-10-30
Payer: COMMERCIAL

## 2024-10-30 VITALS
RESPIRATION RATE: 16 BRPM | WEIGHT: 135 LBS | SYSTOLIC BLOOD PRESSURE: 112 MMHG | DIASTOLIC BLOOD PRESSURE: 70 MMHG | HEART RATE: 89 BPM | OXYGEN SATURATION: 96 % | HEIGHT: 62 IN | TEMPERATURE: 97.9 F | BODY MASS INDEX: 24.84 KG/M2

## 2024-10-30 DIAGNOSIS — F32.1 DEPRESSION, MAJOR, SINGLE EPISODE, MODERATE (MULTI): ICD-10-CM

## 2024-10-30 DIAGNOSIS — E11.9 CONTROLLED TYPE 2 DIABETES MELLITUS WITHOUT COMPLICATION, WITHOUT LONG-TERM CURRENT USE OF INSULIN (MULTI): ICD-10-CM

## 2024-10-30 DIAGNOSIS — E11.43 GASTROPARESIS DUE TO DM (MULTI): ICD-10-CM

## 2024-10-30 DIAGNOSIS — Z00.00 HEALTH CARE MAINTENANCE: ICD-10-CM
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
## 2024-10-30 DIAGNOSIS — K31.84 GASTROPARESIS DUE TO DM (MULTI): ICD-10-CM

## 2024-10-30 DIAGNOSIS — I10 PRIMARY HYPERTENSION: ICD-10-CM

## 2024-10-30 DIAGNOSIS — N39.3 STRESS INCONTINENCE OF URINE: ICD-10-CM

## 2024-10-30 PROCEDURE — 4010F ACE/ARB THERAPY RXD/TAKEN: CPT | Performed by: INTERNAL MEDICINE

## 2024-10-30 PROCEDURE — 3078F DIAST BP <80 MM HG: CPT | Performed by: INTERNAL MEDICINE

## 2024-10-30 PROCEDURE — 1036F TOBACCO NON-USER: CPT | Performed by: INTERNAL MEDICINE

## 2024-10-30 PROCEDURE — 3048F LDL-C <100 MG/DL: CPT | Performed by: INTERNAL MEDICINE

## 2024-10-30 PROCEDURE — 3008F BODY MASS INDEX DOCD: CPT | Performed by: INTERNAL MEDICINE

## 2024-10-30 PROCEDURE — 99214 OFFICE O/P EST MOD 30 MIN: CPT | Performed by: INTERNAL MEDICINE

## 2024-10-30 PROCEDURE — 3062F POS MACROALBUMINURIA REV: CPT | Performed by: INTERNAL MEDICINE

## 2024-10-30 PROCEDURE — 1157F ADVNC CARE PLAN IN RCRD: CPT | Performed by: INTERNAL MEDICINE

## 2024-10-30 PROCEDURE — 3074F SYST BP LT 130 MM HG: CPT | Performed by: INTERNAL MEDICINE

## 2024-10-30 PROCEDURE — 1159F MED LIST DOCD IN RCRD: CPT | Performed by: INTERNAL MEDICINE

## 2024-10-30 PROCEDURE — 1123F ACP DISCUSS/DSCN MKR DOCD: CPT | Performed by: INTERNAL MEDICINE

## 2024-10-30 PROCEDURE — 1160F RVW MEDS BY RX/DR IN RCRD: CPT | Performed by: INTERNAL MEDICINE

## 2024-10-30 RX ORDER — BUPROPION HYDROCHLORIDE 200 MG/1
200 TABLET, EXTENDED RELEASE ORAL DAILY
Qty: 30 TABLET | Refills: 3 | Status: SHIPPED | OUTPATIENT
Start: 2024-10-30

## 2024-10-30 RX ORDER — LOSARTAN POTASSIUM 50 MG/1
100 TABLET ORAL DAILY
Qty: 90 TABLET | Refills: 3 | Status: SHIPPED | OUTPATIENT
Start: 2024-10-30

## 2024-10-31 ENCOUNTER — TELEMEDICINE (OUTPATIENT)
Dept: ENDOCRINOLOGY | Facility: CLINIC | Age: 74
End: 2024-10-31
Payer: COMMERCIAL

## 2024-10-31 DIAGNOSIS — Z97.8 USES SELF-APPLIED CONTINUOUS GLUCOSE MONITORING DEVICE: ICD-10-CM

## 2024-10-31 DIAGNOSIS — E11.9 CONTROLLED TYPE 2 DIABETES MELLITUS WITHOUT COMPLICATION, WITHOUT LONG-TERM CURRENT USE OF INSULIN (MULTI): Primary | ICD-10-CM

## 2024-10-31 DIAGNOSIS — R73.9 HYPERGLYCEMIA: ICD-10-CM

## 2024-10-31 PROCEDURE — 1157F ADVNC CARE PLAN IN RCRD: CPT | Performed by: STUDENT IN AN ORGANIZED HEALTH CARE EDUCATION/TRAINING PROGRAM

## 2024-10-31 PROCEDURE — 3048F LDL-C <100 MG/DL: CPT | Performed by: STUDENT IN AN ORGANIZED HEALTH CARE EDUCATION/TRAINING PROGRAM

## 2024-10-31 PROCEDURE — 99442 PR PHYS/QHP TELEPHONE EVALUATION 11-20 MIN: CPT | Performed by: STUDENT IN AN ORGANIZED HEALTH CARE EDUCATION/TRAINING PROGRAM

## 2024-10-31 PROCEDURE — 4010F ACE/ARB THERAPY RXD/TAKEN: CPT | Performed by: STUDENT IN AN ORGANIZED HEALTH CARE EDUCATION/TRAINING PROGRAM

## 2024-10-31 PROCEDURE — 1159F MED LIST DOCD IN RCRD: CPT | Performed by: STUDENT IN AN ORGANIZED HEALTH CARE EDUCATION/TRAINING PROGRAM

## 2024-10-31 PROCEDURE — 1123F ACP DISCUSS/DSCN MKR DOCD: CPT | Performed by: STUDENT IN AN ORGANIZED HEALTH CARE EDUCATION/TRAINING PROGRAM

## 2024-10-31 PROCEDURE — 3062F POS MACROALBUMINURIA REV: CPT | Performed by: STUDENT IN AN ORGANIZED HEALTH CARE EDUCATION/TRAINING PROGRAM

## 2024-10-31 RX ORDER — GLIPIZIDE 5 MG/1
5 TABLET ORAL
Qty: 180 TABLET | Refills: 1 | Status: SHIPPED | OUTPATIENT
Start: 2024-10-31 | End: 2025-10-31

## 2024-11-09 DIAGNOSIS — Z00.00 HEALTH CARE MAINTENANCE: Primary | ICD-10-CM

## 2024-11-11 RX ORDER — CHLORTHALIDONE 25 MG/1
25 TABLET ORAL DAILY
Qty: 90 TABLET | Refills: 3 | Status: SHIPPED | OUTPATIENT
Start: 2024-11-11

## 2024-11-11 RX ORDER — OMEPRAZOLE 20 MG/1
20 CAPSULE, DELAYED RELEASE ORAL DAILY
Qty: 90 CAPSULE | Refills: 3 | Status: SHIPPED | OUTPATIENT
Start: 2024-11-11

## 2024-11-18 ENCOUNTER — APPOINTMENT (OUTPATIENT)
Dept: OPHTHALMOLOGY | Facility: CLINIC | Age: 74
End: 2024-11-18
Payer: COMMERCIAL

## 2024-11-18 DIAGNOSIS — H26.492 POSTERIOR CAPSULAR OPACIFICATION NON VISUALLY SIGNIFICANT OF LEFT EYE: ICD-10-CM

## 2024-11-18 DIAGNOSIS — Z96.1 PSEUDOPHAKIA OF LEFT EYE: ICD-10-CM

## 2024-11-18 DIAGNOSIS — H04.123 DRY EYE SYNDROME OF BOTH EYES: ICD-10-CM

## 2024-11-18 DIAGNOSIS — H40.003 GLAUCOMA SUSPECT OF BOTH EYES: Primary | ICD-10-CM

## 2024-11-18 DIAGNOSIS — Z96.1 PSEUDOPHAKIA OF RIGHT EYE: ICD-10-CM

## 2024-11-18 DIAGNOSIS — E11.9 TYPE 2 DIABETES MELLITUS WITHOUT RETINOPATHY (MULTI): ICD-10-CM

## 2024-11-18 DIAGNOSIS — H26.491 PCO (POSTERIOR CAPSULAR OPACIFICATION), RIGHT: ICD-10-CM

## 2024-11-18 LAB
AVERAGE RNFL TODAY (OD): 90 MICRONS
AVERAGE RNFL TODAY (OS): 85 MICRONS

## 2024-11-18 PROCEDURE — 99214 OFFICE O/P EST MOD 30 MIN: CPT | Performed by: OPHTHALMOLOGY

## 2024-11-18 PROCEDURE — 92133 CPTRZD OPH DX IMG PST SGM ON: CPT | Performed by: OPHTHALMOLOGY

## 2024-11-18 PROCEDURE — 66821 AFTER CATARACT LASER SURGERY: CPT | Mod: RIGHT SIDE | Performed by: OPHTHALMOLOGY

## 2024-11-18 ASSESSMENT — CONF VISUAL FIELD
OD_SUPERIOR_NASAL_RESTRICTION: 0
OD_INFERIOR_TEMPORAL_RESTRICTION: 0
OD_NORMAL: 1
OS_INFERIOR_TEMPORAL_RESTRICTION: 0
OS_NORMAL: 1
OS_SUPERIOR_NASAL_RESTRICTION: 0
OS_SUPERIOR_TEMPORAL_RESTRICTION: 0
OD_INFERIOR_NASAL_RESTRICTION: 0
METHOD: COUNTING FINGERS
OD_SUPERIOR_TEMPORAL_RESTRICTION: 0
OS_INFERIOR_NASAL_RESTRICTION: 0

## 2024-11-18 ASSESSMENT — VISUAL ACUITY
OD_SC: 20/25
OD_BAT_MED: 20/40
METHOD: SNELLEN - LINEAR
OS_SC: 20/200
OD_SC+: -1

## 2024-11-18 ASSESSMENT — SLIT LAMP EXAM - LIDS
COMMENTS: NORMAL
COMMENTS: NORMAL

## 2024-11-18 ASSESSMENT — EXTERNAL EXAM - RIGHT EYE: OD_EXAM: NORMAL

## 2024-11-18 ASSESSMENT — CUP TO DISC RATIO
OS_RATIO: .5
OD_RATIO: .5

## 2024-11-18 ASSESSMENT — REFRACTION_MANIFEST
OS_SPHERE: -3.75
OD_SPHERE: -0.50
OD_AXIS: 025
OD_ADD: +2.50
OS_ADD: +2.50
OS_CYLINDER: +0.50
OS_AXIS: 005
OD_CYLINDER: +1.50

## 2024-11-18 ASSESSMENT — TONOMETRY
OD_IOP_MMHG: 14
IOP_METHOD: GOLDMANN APPLANATION
OS_IOP_MMHG: 14

## 2024-11-18 ASSESSMENT — ENCOUNTER SYMPTOMS: EYES NEGATIVE: 1

## 2024-11-18 ASSESSMENT — EXTERNAL EXAM - LEFT EYE: OS_EXAM: NORMAL

## 2024-11-18 NOTE — PROGRESS NOTES
Assessment/Plan   Diagnoses and all orders for this visit:  Diabetes mellitus type 2 in obese (CMS/HCC)  Last A1c 6.5 (6/2023)  No diabetic retinopathy (DR) both eyes (OU)   Encouraged diabetes mellitus (DM) control  -     OCT, Retina - OU - Both Eyes    Glaucoma suspect of both eyes  -     OCT, Optic Nerve - OU - Both Eyes    PCO (posterior capsular opacification), right  PCO visually significant and interfers with pt daily activities such as driving and working on a computer  Explained r/b/a of procedure. Patient would like to proceed.      Dry eye syndrome of both eyes  Significant dryness OU  ATs bid (pt uses only prn)     Posterior capsular opacification non visually significant of left eye  Monitor    1 year for DFE, OCT RNFL

## 2024-11-21 ENCOUNTER — CLINICAL SUPPORT (OUTPATIENT)
Dept: AUDIOLOGY | Facility: CLINIC | Age: 74
End: 2024-11-21
Payer: COMMERCIAL

## 2024-11-21 DIAGNOSIS — H91.93 BILATERAL HEARING LOSS, UNSPECIFIED HEARING LOSS TYPE: ICD-10-CM

## 2024-11-21 DIAGNOSIS — R42 DIZZINESS: ICD-10-CM

## 2024-11-21 DIAGNOSIS — H90.3 SENSORINEURAL HEARING LOSS (SNHL) OF BOTH EARS: Primary | ICD-10-CM

## 2024-11-21 PROCEDURE — 92557 COMPREHENSIVE HEARING TEST: CPT | Performed by: AUDIOLOGIST

## 2024-11-21 PROCEDURE — 92550 TYMPANOMETRY & REFLEX THRESH: CPT | Performed by: AUDIOLOGIST

## 2024-11-21 NOTE — LETTER
2024     AIMEE Reyes  [address] [facility] [address], [address]  [place] [address]    Patient: Nan Crane   YOB: 1950   Date of Visit: 2024       Dear AIMEE Mercer:    Thank you for referring Nan Crane to me for evaluation. Below are my notes for this consultation.  If you have questions, please do not hesitate to call me. I look forward to following your patient along with you.       Sincerely,     Aleksandra Enrique, GREG, CCC-A      CC: No Recipients  ______________________________________________________________________________________    AUDIOLOGY ADULT AUDIOMETRIC EVALUATION      Name:  Nan Crane  :  1950  Age:  74 y.o.  Date of Evaluation: 24    History:  Reason for visit:  Ms. Nan Crane was seen today for an evaluation of hearing.   The patient was kindly referred by their otolaryngology provider, AIMEE Donahue   Her current primary care physician, is Irvin Muñoz MD.  Chief Complaint   Patient presents with   • Dizziness   • imbalance   • Fall     Reported since this past March, she has been experiencing sensations of dizziness and imbalance, accompanied by significant falls. She has been feeling unsteady and slightly dizzy and has recently fallen, hitting her face and having a possible concussion. She has been working with her primary care physician and stated some of her medications have been discontinued or stopped and since then, she has noticed improvement in her dizziness. Mentioned at this time she does feel safe to move and does not have a concern for falling.   Reported a history of neck arthritis and shoulder pain, but stated she is scheduled for right sided shoulder surgery this coming January.   She feels she has been able to hear and communicate without significant difficulty, but admitted to have some occasional difficulty hearing softer speakers. Mentioned she may notice some gradual slight  hearing loss, but does not have any significant concerns for hearing or communicating.   Reported a history of sinus/allergies issues, but denied any current sinus/throat concerns.  Stated she has a history of diabetes, which is well under control.  Denied any current otalgia, aural fullness, tinnitus, ear pressure, ear surgery, recent ear/sinus infections, recent falls, significant noise exposure, chemotherapy/radiation, heart/kidney problems, recent heart attack/strokes, new onset of headaches, sinus/throat concerns, speech/memory concerns, ear drainage, or sudden hearing loss.    SCREENINGS  Steadi Fall Risk  One or more falls in the last year? Yes  How many Times? 2 or more  Was the patient injured in the fall? Yes  Has trouble stepping onto curb?    Advised to use a cane or walker to get around safely?    Often has to rush to toilet?    Feels unsteady when walking? No  Has lost some feeling in feet?    Often feels sad or depressed?    Steadies self on furniture while walking at home?    Takes medicine that makes them feel lightheaded or more tired than usual?    Worried about Falling? No  Takes medicine to sleep or improve mood?    Needs to push with hands when rising from a chair?      Domestic Violence Screening  Are you currently or have you recently been threatened or abused physically, emotionally, or secually by anyone? No  Do you feel UNSAFE going back to the place you are living? No  Pain Assessment  Pain Assessment: 0-10  0-10 (Numeric) Pain Score: 0 - No pain    EVALUATION     See Audiogram    RESULTS:    Otoscopic Evaluation:   Right Ear: Otoscopy revealed a clear healthy canal and a healthy tympanic membrane was visualized.   Left Ear: Otoscopy revealed a clear healthy canal and a healthy tympanic membrane was visualized.     Immittance:  Immittance Measures: 226 Hz   Right Ear: Tympanometric testing revealed a normal type A tympanogram with normal middle ear pressure and normal static  compliance.  Left Ear: Tympanometric testing revealed a normal type A tympanogram with normal middle ear pressure and normal static compliance.    Right Ear: Ipsilateral acoustic reflexes were present at, 500-2,000 Hz, at expected sensation levels and absent at 4,000 Hz.  Left Ear: Ipsilateral acoustic reflexes were present at, 500-2,000 Hz, at expected sensation levels and absent at 4,000 Hz.    Test technique:  Pure Tone Audiometry via insert earphones    Reliability:   excellent    Pure Tone Audiometry:    Right Ear: Audiometric testing indicated normal peripheral hearing sensitivity through 1,000 Hz, sloping to a mild essentially flat sensorineural hearing loss above.   Left Ear:  Audiometric testing indicated normal peripheral hearing sensitivity through 1,000 Hz, sloping to a mild essentially flat sensorineural hearing loss above.       Speech Audiometry:   Right Ear:  Speech Reception Threshold (SRT) was obtained at 20 dBHL                  Word Recognition scores were excellent (100%) in quiet when words were presented at 60 dBHL  Left Ear:  Speech Reception Threshold (SRT) was obtained at  20 dBHL                  Word Recognition scores were excellent (100%) in quiet when words were presented at 60 dBHL  Testing was performed with recorded NU-6 speech words in quiet. Speech thresholds were in good agreement with the pure tone averages in each ear.     Binaural QuickSin testing indicated a score of 6.5 in the right ear. Results are consistent with a mild (3-7 dB) signal to noise ratio loss. Results suggest the patient may hear almost as well as those with normal hearing are able to hear in the presence of background noise.   Binaural QuickSin testing indicated a score of 7.5 in the left ear. Results are consistent with a mild (3-7 dB) signal to noise ratio loss. Results suggest the patient may hear almost as well as those with normal hearing are able to hear in the presence of background noise.       IMPRESSIONS:  Today's test results are  consistent with a mild sensorineural hearing loss in each ear. The patient may experience some difficulty listening in the presence of background noise and hearing the softest sounds of speech .  The patient was counseled with regard to the findings and stated she will continue to utilize good communication strategies at this time.     RECOMMENDATIONS:  * Continue medical follow up with Irvin Muñoz MD.  * Retest as medically indicated, or sooner if a change in hearing sensitivity or dizziness  is noticed.   * Wear hearing protection while in the presence of loud sounds.   * Pending medical management and patient desire, consider returning for a hearing aid evaluation to discuss amplification options and communication needs. The patient was instructed to call their insurance to check for any hearing aid benefits and to determine if [facility] was in network for hearing aids. The patient is not interested in hearing aids at this time.   * Use effective communication strategies such as asking the speaker to gain attention prior to speaking, speaking in the same room, repeating words that were heard, etc.  * Consider use of T.V. Ears, or like device, while watching television.    PATIENT EDUCATION:   Discussed results and recommendations with the patient and a copy of the hearing test was provided.  Questions were addressed and the patient was encouraged to contact our department should concerns arise.  Discussed speech intelligibility, cognitive load and listening effort. The patient was counseled although there are still significant hearing abilities, the sound sounds of speech are not coming in as easily as they once did. Counseled to consider hearing aids, to help reduce the amount of listening effort required by the brain.  The patient was seen from  3:30-4:10 pm.

## 2024-11-21 NOTE — LETTER
2024     AIMEE Reyes  [address] [facility] [address], [address]  [facility] [address]    Patient: Nan Crane   YOB: 1950   Date of Visit: 2024       Dear AIMEE Mercer:    Thank you for referring Nan Crane to me for evaluation. Below are my notes for this consultation.  If you have questions, please do not hesitate to call me. I look forward to following your patient along with you.       Sincerely,     Aleksandra Enrique, GREG, CCC-A      CC: Irvin Muñoz MD  ______________________________________________________________________________________    AUDIOLOGY ADULT AUDIOMETRIC EVALUATION      Name:  Nan Crane  :  1950  Age:  74 y.o.  Date of Evaluation: 24    History:  Reason for visit:  Ms. Nan Crane was seen today for an evaluation of hearing.   The patient was kindly referred by their otolaryngology provider, AIMEE Donahue   Her current primary care physician, is Irvin Muñoz MD.  Chief Complaint   Patient presents with   • Dizziness   • imbalance   • Fall     Reported since this past March, she has been experiencing sensations of dizziness and imbalance, accompanied by significant falls. She has been feeling unsteady and slightly dizzy and has recently fallen, hitting her face and having a possible concussion. She has been working with her primary care physician and stated some of her medications have been discontinued or stopped and since then, she has noticed improvement in her dizziness. Mentioned at this time she does feel safe to move and does not have a concern for falling.   Reported a history of neck arthritis and shoulder pain, but stated she is scheduled for right sided shoulder surgery this coming January.   She feels she has been able to hear and communicate without significant difficulty, but admitted to have some occasional difficulty hearing softer speakers. Mentioned she may notice some gradual  slight hearing loss, but does not have any significant concerns for hearing or communicating.   Reported a history of sinus/allergies issues, but denied any current sinus/throat concerns.  Stated she has a history of diabetes, which is well under control.  Denied any current otalgia, aural fullness, tinnitus, ear pressure, ear surgery, recent ear/sinus infections, recent falls, significant noise exposure, chemotherapy/radiation, heart/kidney problems, recent heart attack/strokes, new onset of headaches, sinus/throat concerns, speech/memory concerns, ear drainage, or sudden hearing loss.    SCREENINGS  Steadi Fall Risk  One or more falls in the last year? Yes  How many Times? 2 or more  Was the patient injured in the fall? Yes  Has trouble stepping onto curb?    Advised to use a cane or walker to get around safely?    Often has to rush to toilet?    Feels unsteady when walking? No  Has lost some feeling in feet?    Often feels sad or depressed?    Steadies self on furniture while walking at home?    Takes medicine that makes them feel lightheaded or more tired than usual?    Worried about Falling? No  Takes medicine to sleep or improve mood?    Needs to push with hands when rising from a chair?      Domestic Violence Screening  Are you currently or have you recently been threatened or abused physically, emotionally, or secually by anyone? No  Do you feel UNSAFE going back to the place you are living? No  Pain Assessment  Pain Assessment: 0-10  0-10 (Numeric) Pain Score: 0 - No pain    EVALUATION     See Audiogram    RESULTS:    Otoscopic Evaluation:   Right Ear: Otoscopy revealed a clear healthy canal and a healthy tympanic membrane was visualized.   Left Ear: Otoscopy revealed a clear healthy canal and a healthy tympanic membrane was visualized.     Immittance:  Immittance Measures: 226 Hz   Right Ear: Tympanometric testing revealed a normal type A tympanogram with normal middle ear pressure and normal static  compliance.  Left Ear: Tympanometric testing revealed a normal type A tympanogram with normal middle ear pressure and normal static compliance.    Right Ear: Ipsilateral acoustic reflexes were present at, 500-2,000 Hz, at expected sensation levels and absent at 4,000 Hz.  Left Ear: Ipsilateral acoustic reflexes were present at, 500-2,000 Hz, at expected sensation levels and absent at 4,000 Hz.    Test technique:  Pure Tone Audiometry via insert earphones    Reliability:   excellent    Pure Tone Audiometry:    Right Ear: Audiometric testing indicated normal peripheral hearing sensitivity through 1,000 Hz, sloping to a mild essentially flat sensorineural hearing loss above.   Left Ear:  Audiometric testing indicated normal peripheral hearing sensitivity through 1,000 Hz, sloping to a mild essentially flat sensorineural hearing loss above.       Speech Audiometry:   Right Ear:  Speech Reception Threshold (SRT) was obtained at 20 dBHL                  Word Recognition scores were excellent (100%) in quiet when words were presented at 60 dBHL  Left Ear:  Speech Reception Threshold (SRT) was obtained at  20 dBHL                  Word Recognition scores were excellent (100%) in quiet when words were presented at 60 dBHL  Testing was performed with recorded NU-6 speech words in quiet. Speech thresholds were in good agreement with the pure tone averages in each ear.     Binaural QuickSin testing indicated a score of 6.5 in the right ear. Results are consistent with a mild (3-7 dB) signal to noise ratio loss. Results suggest the patient may hear almost as well as those with normal hearing are able to hear in the presence of background noise.   Binaural QuickSin testing indicated a score of 7.5 in the left ear. Results are consistent with a mild (3-7 dB) signal to noise ratio loss. Results suggest the patient may hear almost as well as those with normal hearing are able to hear in the presence of background noise.       IMPRESSIONS:  Today's test results are  consistent with a mild sensorineural hearing loss in each ear. The patient may experience some difficulty listening in the presence of background noise and hearing the softest sounds of speech .  The patient was counseled with regard to the findings and stated she will continue to utilize good communication strategies at this time.     RECOMMENDATIONS:  * Continue medical follow up with Irvin Muñoz MD.  * Retest as medically indicated, or sooner if a change in hearing sensitivity or dizziness  is noticed.   * Wear hearing protection while in the presence of loud sounds.   * Pending medical management and patient desire, consider returning for a hearing aid evaluation to discuss amplification options and communication needs. The patient was instructed to call their insurance to check for any hearing aid benefits and to determine if [facility] was in network for hearing aids. The patient is not interested in hearing aids at this time.   * Use effective communication strategies such as asking the speaker to gain attention prior to speaking, speaking in the same room, repeating words that were heard, etc.  * Consider use of T.V. Ears, or like device, while watching television.    PATIENT EDUCATION:   Discussed results and recommendations with the patient and a copy of the hearing test was provided.  Questions were addressed and the patient was encouraged to contact our department should concerns arise.  Discussed speech intelligibility, cognitive load and listening effort. The patient was counseled although there are still significant hearing abilities, the sound sounds of speech are not coming in as easily as they once did. Counseled to consider hearing aids, to help reduce the amount of listening effort required by the brain.  The patient was seen from  3:30-4:10 pm.

## 2024-11-22 ASSESSMENT — PAIN SCALES - GENERAL: PAINLEVEL_OUTOF10: 0 - NO PAIN

## 2024-11-22 ASSESSMENT — PAIN - FUNCTIONAL ASSESSMENT: PAIN_FUNCTIONAL_ASSESSMENT: 0-10

## 2024-11-22 ASSESSMENT — ENCOUNTER SYMPTOMS: OCCASIONAL FEELINGS OF UNSTEADINESS: 0

## 2024-11-27 ENCOUNTER — APPOINTMENT (OUTPATIENT)
Dept: PRIMARY CARE | Facility: CLINIC | Age: 74
End: 2024-11-27
Payer: COMMERCIAL

## 2024-11-27 VITALS
HEIGHT: 62 IN | SYSTOLIC BLOOD PRESSURE: 120 MMHG | TEMPERATURE: 97.3 F | HEART RATE: 99 BPM | OXYGEN SATURATION: 97 % | WEIGHT: 134.6 LBS | DIASTOLIC BLOOD PRESSURE: 68 MMHG | RESPIRATION RATE: 18 BRPM | BODY MASS INDEX: 24.77 KG/M2

## 2024-11-27 DIAGNOSIS — Z00.00 HEALTH CARE MAINTENANCE: ICD-10-CM

## 2024-11-27 DIAGNOSIS — N39.41 URGE INCONTINENCE OF URINE: ICD-10-CM

## 2024-11-27 DIAGNOSIS — I10 PRIMARY HYPERTENSION: Primary | ICD-10-CM

## 2024-11-27 DIAGNOSIS — F32.1 DEPRESSION, MAJOR, SINGLE EPISODE, MODERATE (MULTI): ICD-10-CM

## 2024-11-27 DIAGNOSIS — R42 DIZZINESS: ICD-10-CM

## 2024-11-27 PROCEDURE — 4010F ACE/ARB THERAPY RXD/TAKEN: CPT | Performed by: INTERNAL MEDICINE

## 2024-11-27 PROCEDURE — 1160F RVW MEDS BY RX/DR IN RCRD: CPT | Performed by: INTERNAL MEDICINE

## 2024-11-27 PROCEDURE — 3062F POS MACROALBUMINURIA REV: CPT | Performed by: INTERNAL MEDICINE

## 2024-11-27 PROCEDURE — 3074F SYST BP LT 130 MM HG: CPT | Performed by: INTERNAL MEDICINE

## 2024-11-27 PROCEDURE — 1123F ACP DISCUSS/DSCN MKR DOCD: CPT | Performed by: INTERNAL MEDICINE

## 2024-11-27 PROCEDURE — 99213 OFFICE O/P EST LOW 20 MIN: CPT | Performed by: INTERNAL MEDICINE

## 2024-11-27 PROCEDURE — 1157F ADVNC CARE PLAN IN RCRD: CPT | Performed by: INTERNAL MEDICINE

## 2024-11-27 PROCEDURE — 3008F BODY MASS INDEX DOCD: CPT | Performed by: INTERNAL MEDICINE

## 2024-11-27 PROCEDURE — 1159F MED LIST DOCD IN RCRD: CPT | Performed by: INTERNAL MEDICINE

## 2024-11-27 PROCEDURE — 3048F LDL-C <100 MG/DL: CPT | Performed by: INTERNAL MEDICINE

## 2024-11-27 PROCEDURE — 3078F DIAST BP <80 MM HG: CPT | Performed by: INTERNAL MEDICINE

## 2024-11-27 PROCEDURE — 1036F TOBACCO NON-USER: CPT | Performed by: INTERNAL MEDICINE

## 2024-11-27 RX ORDER — BUPROPION HYDROCHLORIDE 100 MG/1
100 TABLET, EXTENDED RELEASE ORAL DAILY
Qty: 60 TABLET | Refills: 1 | Status: SHIPPED | OUTPATIENT
Start: 2024-11-27

## 2024-11-27 RX ORDER — BUPROPION HYDROCHLORIDE 100 MG/1
200 TABLET, EXTENDED RELEASE ORAL DAILY
Qty: 60 TABLET | Refills: 1 | Status: SHIPPED | OUTPATIENT
Start: 2024-11-27 | End: 2024-11-27 | Stop reason: DRUGHIGH

## 2024-11-27 RX ORDER — LOSARTAN POTASSIUM 50 MG/1
50 TABLET ORAL DAILY
Qty: 90 TABLET | Refills: 3 | Status: SHIPPED | OUTPATIENT
Start: 2024-11-27

## 2024-11-27 ASSESSMENT — ENCOUNTER SYMPTOMS
WEAKNESS: 0
SLEEP DISTURBANCE: 0
ARTHRALGIAS: 0
DYSURIA: 0
CHEST TIGHTNESS: 0
WHEEZING: 0
DIZZINESS: 0
SORE THROAT: 0
COUGH: 0
CHILLS: 0
DIARRHEA: 0
ABDOMINAL PAIN: 0
FATIGUE: 0
VOMITING: 0
ADENOPATHY: 0
NAUSEA: 0
CONSTIPATION: 0
SHORTNESS OF BREATH: 0
PALPITATIONS: 0
CONFUSION: 0
UNEXPECTED WEIGHT CHANGE: 0
JOINT SWELLING: 0

## 2024-11-27 NOTE — ASSESSMENT & PLAN NOTE
Much better since med change, losartan 50 mg every day and Wellbutrin decreased dose  to 200 mg bid

## 2024-11-27 NOTE — PATIENT INSTRUCTIONS
Continue losartan 50 mg daily  Decrease Wellbutrin to 100 mg twice a day  oxybutynin was stopped   Monitor for signs of depression and call if is developing  Fu in 1 month   Ways to Help Prevent Falls at Home    Quick Tips   ? Ask for help if you need it. Most people want to help!   ? Get up slowly after sitting or laying down   ? Wear a medical alert device or keep cell phone in your pocket   ? Use night lights, especially areas near a bathroom   ? Keep the items you use often within reach on a small stool or end table   ? Use an assistive device such as walker or cane, as directed by provider/physical therapy   ? Use a non-slip mat and grab bars in your bathroom. Look for home health sections for best options     Other Areas to Focus On   ? Exercise and nutrition: Regular exercise or taking a falls prevention class are great ways improve strength and balance. Don’t forget to stay hydrated and bring a snack!   ? Medicine side effects: Some medicines can make you sleepy or dizzy, which could cause a fall. Ask your healthcare provider about the side effects your medicines could cause. Be sure to let them know if you take any vitamins or supplements as well.   ? Tripping hazards: Remove items you could trip on, such as loose mats, rugs, cords, and clutter. Wear closed toe shoes with rubber soles.   ? Health and wellness: Get regular checkups with your healthcare provider, plus routine vision and hearing screenings. Talk with your healthcare provider about:   o Your medicines and the possible side effects - bring them in a bag if that is easier!   o Problems with balance or feeling dizzy   o Ways to promote bone health, such as Vitamin D and calcium supplements   o Questions or concerns about falling     *Ask your healthcare team if you have questions     ©[facility], 2022

## 2024-11-27 NOTE — PROGRESS NOTES
Subjective   Nan Crane is a 74 y.o. female who presents for Follow-up (1 month follow up  fall).  1 month follow up post fall,patient states dr Muñoz changed some of her  meds at  the last appointment  with her and she feels much better , no unsteadiness . Is on Wellbutrin 200 mg bid depression is ok and on losartan 50 mg now.  No  headaches, no dizziness, no complains , feeling well       Review of Systems   Constitutional:  Negative for chills, fatigue and unexpected weight change.        Comment   HENT:  Negative for congestion, ear pain and sore throat.    Respiratory:  Negative for cough, chest tightness, shortness of breath and wheezing.    Cardiovascular:  Negative for palpitations and leg swelling.   Gastrointestinal:  Negative for abdominal pain, constipation, diarrhea, nausea and vomiting.   Genitourinary:  Negative for dysuria and urgency.   Musculoskeletal:  Negative for arthralgias and joint swelling.   Skin:  Negative for rash.   Neurological:  Negative for dizziness and weakness.   Hematological:  Negative for adenopathy.   Psychiatric/Behavioral:  Negative for confusion and sleep disturbance.        Objective   Physical Exam  Constitutional:       Appearance: Normal appearance.   HENT:      Head: Normocephalic and atraumatic.   Eyes:      Pupils: Pupils are equal, round, and reactive to light.   Cardiovascular:      Rate and Rhythm: Normal rate and regular rhythm.   Pulmonary:      Effort: Pulmonary effort is normal.      Breath sounds: Normal breath sounds.   Musculoskeletal:         General: Normal range of motion.      Cervical back: Normal range of motion and neck supple.   Skin:     General: Skin is warm.   Neurological:      General: No focal deficit present.      Mental Status: She is alert and oriented to person, place, and time.   Psychiatric:         Mood and Affect: Mood normal.         Behavior: Behavior normal.       /68 (BP Location: Left arm, Patient Position: Sitting)    "Pulse 99   Temp 36.3 °C (97.3 °F)   Resp 18   Ht 1.575 m (5' 2\")   Wt 61.1 kg (134 lb 9.6 oz)   SpO2 97%   BMI 24.62 kg/m²       Assessment/Plan   Problem List Items Addressed This Visit       Depression, major, single episode, moderate (Multi)     Will decrease Wellbutrin to 100 mg bid, if depressed will add Remeron         Relevant Medications    buPROPion SR (Wellbutrin SR) 100 mg 12 hr tablet    Dizziness     Much better since med change, losartan 50 mg every day and Wellbutrin decreased dose  to 200 mg bid         HTN (hypertension) - Primary     On losartan 50 mg daily, bp well controlled         Urge incontinence of urine     Is ok   Not on oxybutynin sec to side effects          Other Visit Diagnoses       Health care maintenance        Relevant Medications    losartan (Cozaar) 50 mg tablet            Patient was identified as a fall risk. Risk prevention instructions provided.  "

## 2024-12-03 ENCOUNTER — TELEPHONE (OUTPATIENT)
Dept: ENDOCRINOLOGY | Facility: CLINIC | Age: 74
End: 2024-12-03
Payer: COMMERCIAL

## 2024-12-03 DIAGNOSIS — E11.9 CONTROLLED TYPE 2 DIABETES MELLITUS WITHOUT COMPLICATION, WITHOUT LONG-TERM CURRENT USE OF INSULIN (MULTI): ICD-10-CM

## 2024-12-18 ENCOUNTER — TELEPHONE (OUTPATIENT)
Dept: PRIMARY CARE | Facility: CLINIC | Age: 74
End: 2024-12-18
Payer: COMMERCIAL

## 2024-12-18 DIAGNOSIS — E11.9 CONTROLLED TYPE 2 DIABETES MELLITUS WITHOUT COMPLICATION, WITHOUT LONG-TERM CURRENT USE OF INSULIN (MULTI): ICD-10-CM

## 2024-12-19 ENCOUNTER — APPOINTMENT (OUTPATIENT)
Dept: PRIMARY CARE | Facility: CLINIC | Age: 74
End: 2024-12-19
Payer: COMMERCIAL

## 2024-12-19 VITALS
HEART RATE: 91 BPM | TEMPERATURE: 97.5 F | SYSTOLIC BLOOD PRESSURE: 118 MMHG | DIASTOLIC BLOOD PRESSURE: 60 MMHG | RESPIRATION RATE: 16 BRPM | HEIGHT: 62 IN | WEIGHT: 137.8 LBS | BODY MASS INDEX: 25.36 KG/M2 | OXYGEN SATURATION: 97 %

## 2024-12-19 DIAGNOSIS — E11.43 GASTROPARESIS DUE TO DM (MULTI): ICD-10-CM

## 2024-12-19 DIAGNOSIS — E11.9 CONTROLLED TYPE 2 DIABETES MELLITUS WITHOUT COMPLICATION, WITHOUT LONG-TERM CURRENT USE OF INSULIN (MULTI): ICD-10-CM

## 2024-12-19 DIAGNOSIS — I10 PRIMARY HYPERTENSION: Primary | ICD-10-CM

## 2024-12-19 DIAGNOSIS — K31.84 GASTROPARESIS DUE TO DM (MULTI): ICD-10-CM

## 2024-12-19 DIAGNOSIS — F32.1 DEPRESSION, MAJOR, SINGLE EPISODE, MODERATE (MULTI): ICD-10-CM

## 2024-12-19 DIAGNOSIS — N18.30 STAGE 3 CHRONIC KIDNEY DISEASE, UNSPECIFIED WHETHER STAGE 3A OR 3B CKD (MULTI): ICD-10-CM

## 2024-12-19 PROCEDURE — 1159F MED LIST DOCD IN RCRD: CPT | Performed by: INTERNAL MEDICINE

## 2024-12-19 PROCEDURE — 99213 OFFICE O/P EST LOW 20 MIN: CPT | Performed by: INTERNAL MEDICINE

## 2024-12-19 PROCEDURE — 3062F POS MACROALBUMINURIA REV: CPT | Performed by: INTERNAL MEDICINE

## 2024-12-19 PROCEDURE — 4010F ACE/ARB THERAPY RXD/TAKEN: CPT | Performed by: INTERNAL MEDICINE

## 2024-12-19 PROCEDURE — 1160F RVW MEDS BY RX/DR IN RCRD: CPT | Performed by: INTERNAL MEDICINE

## 2024-12-19 PROCEDURE — 3008F BODY MASS INDEX DOCD: CPT | Performed by: INTERNAL MEDICINE

## 2024-12-19 PROCEDURE — 3048F LDL-C <100 MG/DL: CPT | Performed by: INTERNAL MEDICINE

## 2024-12-19 PROCEDURE — 3074F SYST BP LT 130 MM HG: CPT | Performed by: INTERNAL MEDICINE

## 2024-12-19 PROCEDURE — 1036F TOBACCO NON-USER: CPT | Performed by: INTERNAL MEDICINE

## 2024-12-19 PROCEDURE — 3078F DIAST BP <80 MM HG: CPT | Performed by: INTERNAL MEDICINE

## 2024-12-19 PROCEDURE — 1157F ADVNC CARE PLAN IN RCRD: CPT | Performed by: INTERNAL MEDICINE

## 2024-12-19 PROCEDURE — 1123F ACP DISCUSS/DSCN MKR DOCD: CPT | Performed by: INTERNAL MEDICINE

## 2024-12-19 ASSESSMENT — ENCOUNTER SYMPTOMS
SORE THROAT: 0
VOMITING: 0
JOINT SWELLING: 0
NAUSEA: 0
FATIGUE: 0
DYSURIA: 0
CHILLS: 0
WEAKNESS: 0
ADENOPATHY: 0
DIARRHEA: 0
CHEST TIGHTNESS: 0
ARTHRALGIAS: 0
PALPITATIONS: 0
CONFUSION: 0
ABDOMINAL PAIN: 0
WHEEZING: 0
SHORTNESS OF BREATH: 0
UNEXPECTED WEIGHT CHANGE: 0
CONSTIPATION: 0
COUGH: 0
SLEEP DISTURBANCE: 0
DIZZINESS: 0

## 2024-12-19 NOTE — PATIENT INSTRUCTIONS
Was nice seeing you today.  Continue same medication but stop Wellbutrin.  Have lab work done before next appointment if labs were ordered today.  Fu in 3 month/dm  Call/ contact our office with any concerns.    If you have labs or test done and you can't see the report in your chart or you didn't hear from us in 2 weeks after test/labs done , please, call our office for reports.  Please , do not assume that they were normal.    Any test results  and questions you might have , will be discussed at next visit -- please make sure to make a follow up appt after testing if reports are abnormal or you have questions.        Ways to Help Prevent Falls at Home    Quick Tips   ? Ask for help if you need it. Most people want to help!   ? Get up slowly after sitting or laying down   ? Wear a medical alert device or keep cell phone in your pocket   ? Use night lights, especially areas near a bathroom   ? Keep the items you use often within reach on a small stool or end table   ? Use an assistive device such as walker or cane, as directed by provider/physical therapy   ? Use a non-slip mat and grab bars in your bathroom. Look for home health sections for best options     Other Areas to Focus On   ? Exercise and nutrition: Regular exercise or taking a falls prevention class are great ways improve strength and balance. Don’t forget to stay hydrated and bring a snack!   ? Medicine side effects: Some medicines can make you sleepy or dizzy, which could cause a fall. Ask your healthcare provider about the side effects your medicines could cause. Be sure to let them know if you take any vitamins or supplements as well.   ? Tripping hazards: Remove items you could trip on, such as loose mats, rugs, cords, and clutter. Wear closed toe shoes with rubber soles.   ? Health and wellness: Get regular checkups with your healthcare provider, plus routine vision and hearing screenings. Talk with your healthcare provider about:   o Your  medicines and the possible side effects - bring them in a bag if that is easier!   o Problems with balance or feeling dizzy   o Ways to promote bone health, such as Vitamin D and calcium supplements   o Questions or concerns about falling     *Ask your healthcare team if you have questions     ©[facility], 2022

## 2024-12-19 NOTE — PROGRESS NOTES
Subjective   Nan Crane is a 74 y.o. female who presents for Follow-up (1 month  follow up HTN ).  1 month follow  up HTN- checking BP at  home 2-3 times /week, is been good/per patient ,no  headaches,no dizziness    Patient  has questions  regarding seeing a dietician   Patient has questions regarding the  way she is been taking the meds wanted to make vargas she is taking  correctly   She was suppose to be on losartan 50 mg  and Wellbutrin 100 mg bid , she is now on Wellbutrin 100 mg once a day.  No depressed , feels much better now, no more unsteadiness, will dc Wellbutrin.      Review of Systems   Constitutional:  Negative for chills, fatigue and unexpected weight change.        Comment   HENT:  Negative for congestion, ear pain and sore throat.    Respiratory:  Negative for cough, chest tightness, shortness of breath and wheezing.    Cardiovascular:  Negative for palpitations and leg swelling.   Gastrointestinal:  Negative for abdominal pain, constipation, diarrhea, nausea and vomiting.   Genitourinary:  Negative for dysuria and urgency.   Musculoskeletal:  Negative for arthralgias and joint swelling.   Skin:  Negative for rash.   Neurological:  Negative for dizziness and weakness.   Hematological:  Negative for adenopathy.   Psychiatric/Behavioral:  Negative for confusion and sleep disturbance.        Objective   Physical Exam  Constitutional:       Appearance: Normal appearance.   HENT:      Head: Normocephalic and atraumatic.   Eyes:      Pupils: Pupils are equal, round, and reactive to light.   Cardiovascular:      Rate and Rhythm: Normal rate and regular rhythm.   Pulmonary:      Effort: Pulmonary effort is normal.      Breath sounds: Normal breath sounds.   Musculoskeletal:         General: Normal range of motion.      Cervical back: Normal range of motion and neck supple.   Skin:     General: Skin is warm.   Neurological:      General: No focal deficit present.      Mental Status: She is alert and  "oriented to person, place, and time.   Psychiatric:         Mood and Affect: Mood normal.         Behavior: Behavior normal.       /60 (BP Location: Left arm, Patient Position: Sitting)   Pulse 91   Temp 36.4 °C (97.5 °F)   Resp 16   Ht 1.575 m (5' 2\")   Wt 62.5 kg (137 lb 12.8 oz)   SpO2 97%   BMI 25.20 kg/m²       Assessment/Plan   Problem List Items Addressed This Visit       CKD (chronic kidney disease) stage 3, GFR 30-59 ml/min (Multi)     On jardience  10 mg         Depression, major, single episode, moderate (Multi)     Not on medication now, monitor for depression.         Diabetes mellitus type II, controlled, with no complications (Multi)    HTN (hypertension) - Primary     On losartan 50 mg, aldactone and diuretic            Patient was identified as a fall risk. Risk prevention instructions provided.  "

## 2024-12-21 ENCOUNTER — HOSPITAL ENCOUNTER (EMERGENCY)
Facility: HOSPITAL | Age: 74
Discharge: HOME | End: 2024-12-21
Attending: EMERGENCY MEDICINE
Payer: COMMERCIAL

## 2024-12-21 VITALS
TEMPERATURE: 97.7 F | OXYGEN SATURATION: 97 % | RESPIRATION RATE: 19 BRPM | BODY MASS INDEX: 25.21 KG/M2 | DIASTOLIC BLOOD PRESSURE: 82 MMHG | SYSTOLIC BLOOD PRESSURE: 156 MMHG | HEART RATE: 92 BPM | WEIGHT: 137 LBS | HEIGHT: 62 IN

## 2024-12-21 DIAGNOSIS — Z79.4 TYPE 2 DIABETES MELLITUS WITHOUT COMPLICATION, WITH LONG-TERM CURRENT USE OF INSULIN (MULTI): Primary | ICD-10-CM

## 2024-12-21 DIAGNOSIS — E11.9 TYPE 2 DIABETES MELLITUS WITHOUT COMPLICATION, WITH LONG-TERM CURRENT USE OF INSULIN (MULTI): Primary | ICD-10-CM

## 2024-12-21 LAB
ALBUMIN SERPL BCP-MCNC: 4.4 G/DL (ref 3.4–5)
ALP SERPL-CCNC: 60 U/L (ref 33–136)
ALT SERPL W P-5'-P-CCNC: 25 U/L (ref 7–45)
ANION GAP SERPL CALC-SCNC: 18 MMOL/L (ref 10–20)
AST SERPL W P-5'-P-CCNC: 25 U/L (ref 9–39)
BASOPHILS # BLD AUTO: 0.02 X10*3/UL (ref 0–0.1)
BASOPHILS NFR BLD AUTO: 0.3 %
BILIRUB SERPL-MCNC: 0.6 MG/DL (ref 0–1.2)
BUN SERPL-MCNC: 31 MG/DL (ref 6–23)
CALCIUM SERPL-MCNC: 10.4 MG/DL (ref 8.6–10.3)
CHLORIDE SERPL-SCNC: 105 MMOL/L (ref 98–107)
CO2 SERPL-SCNC: 20 MMOL/L (ref 21–32)
CREAT SERPL-MCNC: 1.45 MG/DL (ref 0.5–1.05)
EGFRCR SERPLBLD CKD-EPI 2021: 38 ML/MIN/1.73M*2
EOSINOPHIL # BLD AUTO: 0.17 X10*3/UL (ref 0–0.4)
EOSINOPHIL NFR BLD AUTO: 2.5 %
ERYTHROCYTE [DISTWIDTH] IN BLOOD BY AUTOMATED COUNT: 12.2 % (ref 11.5–14.5)
GLUCOSE BLD MANUAL STRIP-MCNC: 153 MG/DL (ref 74–99)
GLUCOSE SERPL-MCNC: 161 MG/DL (ref 74–99)
HCT VFR BLD AUTO: 39.9 % (ref 36–46)
HGB BLD-MCNC: 13.3 G/DL (ref 12–16)
IMM GRANULOCYTES # BLD AUTO: 0.01 X10*3/UL (ref 0–0.5)
IMM GRANULOCYTES NFR BLD AUTO: 0.1 % (ref 0–0.9)
LYMPHOCYTES # BLD AUTO: 1.99 X10*3/UL (ref 0.8–3)
LYMPHOCYTES NFR BLD AUTO: 29.4 %
MCH RBC QN AUTO: 32.3 PG (ref 26–34)
MCHC RBC AUTO-ENTMCNC: 33.3 G/DL (ref 32–36)
MCV RBC AUTO: 97 FL (ref 80–100)
MONOCYTES # BLD AUTO: 0.56 X10*3/UL (ref 0.05–0.8)
MONOCYTES NFR BLD AUTO: 8.3 %
NEUTROPHILS # BLD AUTO: 4.02 X10*3/UL (ref 1.6–5.5)
NEUTROPHILS NFR BLD AUTO: 59.4 %
NRBC BLD-RTO: 0 /100 WBCS (ref 0–0)
PLATELET # BLD AUTO: 256 X10*3/UL (ref 150–450)
POTASSIUM SERPL-SCNC: 4 MMOL/L (ref 3.5–5.3)
PROT SERPL-MCNC: 7.3 G/DL (ref 6.4–8.2)
RBC # BLD AUTO: 4.12 X10*6/UL (ref 4–5.2)
SODIUM SERPL-SCNC: 139 MMOL/L (ref 136–145)
WBC # BLD AUTO: 6.8 X10*3/UL (ref 4.4–11.3)

## 2024-12-21 PROCEDURE — 82947 ASSAY GLUCOSE BLOOD QUANT: CPT

## 2024-12-21 PROCEDURE — 85025 COMPLETE CBC W/AUTO DIFF WBC: CPT | Performed by: EMERGENCY MEDICINE

## 2024-12-21 PROCEDURE — 99283 EMERGENCY DEPT VISIT LOW MDM: CPT | Performed by: EMERGENCY MEDICINE

## 2024-12-21 PROCEDURE — 82947 ASSAY GLUCOSE BLOOD QUANT: CPT | Mod: 59

## 2024-12-21 PROCEDURE — 99284 EMERGENCY DEPT VISIT MOD MDM: CPT | Performed by: EMERGENCY MEDICINE

## 2024-12-21 PROCEDURE — 36415 COLL VENOUS BLD VENIPUNCTURE: CPT | Performed by: EMERGENCY MEDICINE

## 2024-12-21 PROCEDURE — 80053 COMPREHEN METABOLIC PANEL: CPT | Performed by: EMERGENCY MEDICINE

## 2024-12-21 RX ORDER — DEXTROSE 4 G
TABLET,CHEWABLE ORAL
Qty: 1 EACH | Refills: 0 | Status: SHIPPED | OUTPATIENT
Start: 2024-12-21

## 2024-12-21 ASSESSMENT — LIFESTYLE VARIABLES
HAVE PEOPLE ANNOYED YOU BY CRITICIZING YOUR DRINKING: NO
EVER FELT BAD OR GUILTY ABOUT YOUR DRINKING: NO
TOTAL SCORE: 0
EVER HAD A DRINK FIRST THING IN THE MORNING TO STEADY YOUR NERVES TO GET RID OF A HANGOVER: NO
HAVE YOU EVER FELT YOU SHOULD CUT DOWN ON YOUR DRINKING: NO

## 2024-12-21 ASSESSMENT — PAIN SCALES - GENERAL: PAINLEVEL_OUTOF10: 0 - NO PAIN

## 2024-12-21 ASSESSMENT — PAIN - FUNCTIONAL ASSESSMENT: PAIN_FUNCTIONAL_ASSESSMENT: 0-10

## 2024-12-21 ASSESSMENT — COLUMBIA-SUICIDE SEVERITY RATING SCALE - C-SSRS
2. HAVE YOU ACTUALLY HAD ANY THOUGHTS OF KILLING YOURSELF?: NO
6. HAVE YOU EVER DONE ANYTHING, STARTED TO DO ANYTHING, OR PREPARED TO DO ANYTHING TO END YOUR LIFE?: NO
1. IN THE PAST MONTH, HAVE YOU WISHED YOU WERE DEAD OR WISHED YOU COULD GO TO SLEEP AND NOT WAKE UP?: NO

## 2024-12-22 ENCOUNTER — TELEPHONE (OUTPATIENT)
Dept: PRIMARY CARE | Facility: CLINIC | Age: 74
End: 2024-12-22
Payer: COMMERCIAL

## 2024-12-22 DIAGNOSIS — E11.9 CONTROLLED TYPE 2 DIABETES MELLITUS WITHOUT COMPLICATION, WITHOUT LONG-TERM CURRENT USE OF INSULIN (MULTI): Primary | ICD-10-CM

## 2024-12-22 RX ORDER — FLASH GLUCOSE SENSOR
KIT MISCELLANEOUS
Qty: 6 EACH | Refills: 1 | Status: SHIPPED | OUTPATIENT
Start: 2024-12-22

## 2024-12-22 NOTE — DISCHARGE INSTRUCTIONS
Please note that a prescription for a new glucose monitor as well as testing strips was sent to the [facility] pharmacy.  We advise you call your primary care doctor tomorrow leave a message for them as well to notify them that you need a new constant glucose meter that will need to be precertification through the insurance company.  I have also sent a message here from the emergency room.    Return to the emergent for any worsening concerning symptoms otherwise and use the FreeStyle meter until your new 1 arrives.    Gastroparesis website   [email or web address]

## 2024-12-22 NOTE — ED PROVIDER NOTES
HPI   Chief Complaint   Patient presents with    Hyperglycemia     Pt c/o an increase in blood sugar for the past several months that has been getting progressively worse and states that per her meter it was in the 400s at home today. Pt's BG in triage was 159 and pt is asymptomatic.        This is a very nice 74-year-old female that presents the emergency room with high blood sugar.  She states he is worried because her blood sugars been in the 3 and 400s over the past few days and she is not sure why.  Here in the emergency room, her Accu-Chek was checked at 153, and her glucose on the blood work was also 161.  When she checked with her home Accu-Chek monitor, it was 290 and was less than 5 minutes later.  Therefore I believe she has a malfunctioning Accu-Chek meter.  She states her primary care doctor told her it is old and out of stock and they do not make that 1 anymore therefore recommended she get a new one.  Otherwise she does not have any new symptoms.    Patient denies any side signs of infection, chest pain or shortness of breath, no cough fevers or chills, no trouble urinating, no pain with urination, and no rashes or cellulitis.  She is worried about her blood sugar being higher.  And she wanted some clarity on gastrophrenic diet as she has gastroparesis.              Patient History   Past Medical History:   Diagnosis Date    Adjustment disorder with anxiety 04/24/2017    Acute adjustment disorder with anxiety    Encounter for general adult medical examination without abnormal findings 10/09/2019    Annual physical exam    Glaucoma suspect     Gout, unspecified 07/17/2014    Acute gout    Hyperlipidemia, unspecified 08/22/2022    Hyperlipidemia    Hypokalemia 12/11/2019    Hypokalemia    Other conditions influencing health status     Nephrolithiasis Of The Left Kidney    Other conditions influencing health status     Screening for malignant neoplasms, colon    Personal history of diseases of the skin  and subcutaneous tissue 07/16/2015    History of contact dermatitis    Personal history of diseases of the skin and subcutaneous tissue 05/16/2013    History of local infection of skin and subcutaneous tissue    Personal history of other diseases of the circulatory system     History of hypertension    Personal history of other diseases of the digestive system 02/24/2015    History of gastroenteritis    Personal history of other diseases of the digestive system     History of esophageal reflux    Personal history of other diseases of the musculoskeletal system and connective tissue 07/17/2014    History of low back pain    Personal history of other endocrine, nutritional and metabolic disease 01/14/2013    History of diabetes mellitus    Personal history of other infectious and parasitic diseases     History of herpes zoster    Plantar fascial fibromatosis 09/18/2016    Plantar fasciitis    Type 2 diabetes mellitus with other specified complication 11/17/2022    Diabetes mellitus type 2 in obese    Type 2 diabetes mellitus with other specified complication 11/17/2022    Diabetes mellitus type 2 in obese    Type 2 diabetes mellitus with other specified complication 11/17/2022    Diabetes mellitus type 2 in obese    Type 2 diabetes mellitus with other specified complication 11/17/2022    Diabetes mellitus type 2 in obese    Type 2 diabetes mellitus with other specified complication 11/17/2022    Diabetes mellitus type 2 in obese    Type 2 diabetes mellitus with other specified complication 11/17/2022    Diabetes mellitus type 2 in obese    Type 2 diabetes mellitus with other specified complication 11/17/2022    Diabetes mellitus type 2 in obese    Type 2 diabetes mellitus with other specified complication 11/17/2022    Diabetes mellitus type 2 in obese    Type 2 diabetes mellitus with other specified complication 11/17/2022    Diabetes mellitus type 2 in obese    Type 2 diabetes mellitus with other specified  complication 2022    Diabetes mellitus type 2 in obese    Type 2 diabetes mellitus with other specified complication 2022    Diabetes mellitus type 2 in obese    Type 2 diabetes mellitus with other specified complication 2022    Diabetes mellitus type 2 in obese    Type 2 diabetes mellitus with other specified complication 2022    Diabetes mellitus type 2 in obese    Type 2 diabetes mellitus with other specified complication 2022    Diabetes mellitus type 2 in obese    Type 2 diabetes mellitus with other specified complication 2022    Diabetes mellitus type 2 in obese    Type 2 diabetes mellitus with other specified complication 2022    Diabetes mellitus type 2 in obese    Type 2 diabetes mellitus with other specified complication 2022    Diabetes mellitus type 2 in obese    Type 2 diabetes mellitus with other specified complication 2022    Diabetes mellitus type 2 in obese     Past Surgical History:   Procedure Laterality Date    CATARACT EXTRACTION Bilateral     LASIK Bilateral     MV    OTHER SURGICAL HISTORY  2012    Electrocardiogram    OTHER SURGICAL HISTORY  10/09/2019    Knee surgery    OTHER SURGICAL HISTORY  10/09/2019    Shoulder surgery    OTHER SURGICAL HISTORY  11/15/2019     section    OTHER SURGICAL HISTORY  2016    Adult Caregiver Appeared To Understand Counseling    OTHER SURGICAL HISTORY  2022    Lung surgery    OTHER SURGICAL HISTORY  2019    Corneal lasik    OTHER SURGICAL HISTORY  2019    Cataract surgery     Family History   Problem Relation Name Age of Onset    Other (Malignant Neoplasm Of Ovary) Mother      Diabetes Father      Other (Malignat Neoplasm) Father      Other (Gram Positive Sepsis) Brother      Diabetes Other Grandfather     Glaucoma Neg Hx      Macular degeneration Neg Hx       Social History     Tobacco Use    Smoking status: Never    Smokeless tobacco: Never   Vaping Use    Vaping  status: Never Used   Substance Use Topics    Alcohol use: Not Currently    Drug use: Never       Physical Exam   ED Triage Vitals [12/21/24 2113]   Temperature Heart Rate Respirations BP   36 °C (96.8 °F) (!) 110 18 174/79      Pulse Ox Temp Source Heart Rate Source Patient Position   96 % Temporal Monitor Sitting      BP Location FiO2 (%)     Right arm --       Physical Exam  Constitutional:       Appearance: Normal appearance.   Eyes:      Conjunctiva/sclera: Conjunctivae normal.   Cardiovascular:      Rate and Rhythm: Normal rate.   Pulmonary:      Effort: Pulmonary effort is normal.   Neurological:      General: No focal deficit present.      Mental Status: She is alert and oriented to person, place, and time.           ED Course & MDM   Diagnoses as of 12/21/24 2301   Type 2 diabetes mellitus without complication, with long-term current use of insulin (Multi)                 No data recorded     [place] Coma Scale Score: 15 (12/21/24 2121 : Kortney Flores RN)                           Medical Decision Making  At this time the patient will be given a freestyle Accu-Chek meter until her primary care doctor can get her set up with a new implantable continuous monitoring device.  She is to return to the emergency room for any worsening concerning symptoms otherwise.        Procedure  Procedures     Maciej Wasserman DO  12/21/24 2303

## 2024-12-26 NOTE — TELEPHONE ENCOUNTER
Patient was in er due to her glucose monitor not reading properly.  Dr. Muñoz ended up sending in new sensors- test strips but the patient does not have an actual Moniotor- the monitor the ER prescribed was just to hold her until Dr. NELSON prescribed another monitor- Patient needs Freestlyportillo Jessica to go with the Freestyle Jessica sensor kit. Any questions contact patient

## 2024-12-27 RX ORDER — FLASH GLUCOSE SCANNING READER
EACH MISCELLANEOUS
Qty: 1 EACH | Refills: 0 | Status: SHIPPED | OUTPATIENT
Start: 2024-12-27

## 2025-01-02 ENCOUNTER — TELEPHONE (OUTPATIENT)
Dept: PRIMARY CARE | Facility: CLINIC | Age: 75
End: 2025-01-02
Payer: COMMERCIAL

## 2025-01-02 DIAGNOSIS — Z00.00 HEALTH CARE MAINTENANCE: Primary | ICD-10-CM

## 2025-01-02 DIAGNOSIS — E08.21 DIABETES MELLITUS DUE TO UNDERLYING CONDITION, CONTROLLED, WITH DIABETIC NEPHROPATHY, WITHOUT LONG-TERM CURRENT USE OF INSULIN: Primary | ICD-10-CM

## 2025-01-02 RX ORDER — FLASH GLUCOSE SENSOR
KIT MISCELLANEOUS
Qty: 1 EACH | Refills: 11 | Status: SHIPPED | OUTPATIENT
Start: 2025-01-02

## 2025-01-02 RX ORDER — LOSARTAN POTASSIUM 50 MG/1
50 TABLET ORAL DAILY
Qty: 90 TABLET | Refills: 3 | Status: SHIPPED | OUTPATIENT
Start: 2025-01-02

## 2025-01-15 NOTE — PROGRESS NOTES
Nutrition Initial Assessment:     Patient Nan Crane is a 75 y.o. female being seen at [facility] who was referred by Dr. Muñoz on 12/19/24 for   1. Gastroparesis due to DM (Multi)  Referral to Nutrition Services      2. Controlled type 2 diabetes mellitus without complication, without long-term current use of insulin (Multi)          Nutrition Assessment    Patient Active Problem List   Diagnosis    Abnormal blood chemistry    Acute adjustment disorder with anxiety    Adult situational stress disorder    Allergic rhinitis    Arthritis of hand    BPV (benign positional vertigo)    Cervical spine pain    Change in vision    Chronic pain of toe of left foot    Chronic pain of toe of right foot    CKD (chronic kidney disease) stage 3, GFR 30-59 ml/min (Multi)    Constipation    Depression, major, single episode, moderate (Multi)    Diabetes mellitus type II, controlled, with no complications (Multi)    Dizziness    Dry eye syndrome    Dry mouth not due to sicca syndrome    Dysfunction of both eustachian tubes    Effusion of right knee    Fatigue    Fibroid uterus    Fullness of neck    Fungal nail infection    Gastro-esophageal reflux disease with esophagitis    Hemianopia of left eye    HTN (hypertension)    Hypercalcemia    Hyperlipidemia    Hypokalemia    Knee pain    Lung nodule    Malignant neoplasm of upper lobe of right lung (Multi)    Muscle spasm    Nasal valve collapse    Vomiting    Neuroma of foot    Nevus    Nocturnal leg cramps    OA (osteoarthritis) of knee    Osteoarthritis, localized, shoulder    Posterior capsular opacification non visually significant of left eye    Posterior capsular opacification non visually significant of right eye    Post-nasal drip    Pseudophakia of left eye    Pseudophakia of right eye    Right knee pain    S/P total knee replacement    Sciatica    Seborrheic keratosis    Shoulder pain    Shoulder weakness    Sinobronchitis    Stiffness of cervical spine     Stiffness of right knee, not elsewhere classified    Strain of patellar tendon, sequela    Stress at home    Stye    TIA (transient ischemic attack)    Toe pain    Urge incontinence of urine    UTI (urinary tract infection)    Vaginitis due to Candida    Vitamin D deficiency    Vulvar cyst    Xerosis of skin    Bone deformity    Controlled diabetes mellitus with diabetic nephropathy    Dyshidrosis    Glaucoma suspect of both eyes    Neck pain    Plantar fasciitis    Pre-ulcerative corn or callous    Proteinuria due to type 2 diabetes mellitus (Multi)    Transient visual loss of left eye    Annual physical exam    Diabetic hypoglycemia (Multi)    Eustachian tube dysfunction    Status post lobectomy of lung    Osteopenia of neck of femur    Primary insomnia    Medicare annual wellness visit, subsequent    Sebaceous cyst of labia    Closed head injury    Facial laceration    Fall    History of diabetes mellitus    History of herpes zoster    Malignant neuroendocrine neoplasm    Open fracture of nasal bone    Hospital discharge follow-up    Stress incontinence of urine    Gastroparesis due to DM (Multi)     Nutrition History:  Food & Nutrition History: She is having this visit to discuss diet for gastroparesis. She explained gastroparesis was diagnosed last year and she was given a handout about the diet - she has been following the diet but it is restrictive and she would like to be able to eat a more liberal number of foods if possible. She hasn't had vomiting for many months and doesn't feel early satiety or lack of appetite.  Food Allergies: none  Food Intolerances: none  Prescription medications: Prescription Medication Use: 139 mg/dL this morning fasting, BG was >200 mg/dL at 1 hour post-cereal this morning. She uses Jessica 2. It gets signal loss alarm at night or if she steps away from it, but sometimes when she is near. Takes Jardiance, glipizide, and metformin. Had been on Trulicity but this was discontinued  because it was suspected to be contributing to gastroparesis.  GI Symptoms: none; Occurring >2 weeks? no (she said she began vomiting about 1 year ago, was diagnosed with gastroparesis, vomiting has been resolved since last summer or fall)  Oral Problems: denies (she said she chews very carefully to help grind up food for gastroparesis); Dentition: own  Sleep Habits:  (Signal loss alarm on Jessica 2 wakes her from sleep.)    Diet Recall:  Meal 1: B: 7-8, Rice Krispies, or cream of wheat, occasionally egg/toast. Or a toaster waffle - adds a sprinkle of powdered sugar. Drinks a small glass of juice.  Meal 2: L: 5-6 Filpsides crackers and ham salad around 1 pm  Meal 3: D: 5-6 pm chicken almost every night, soft foods, doesn't eat any lettuce/raw vegetables  Snacks: HS: little bowl of cereal or [place] Instant Breakfast (light)  Food Variety:    Oral Nutrition Supplement Use:  (CIB light)  Fluid Intake: water, small glass of juice at breakfast (since she is limited on how much/what type of solid fruit she may eat on gastroparesis diet)  Energy Intake: Good > 75 %      Food Preparation:  Cooking: Patient, Family (son eats with her)  Grocery Shopping: Patient    Physical Activity:   Was having falls, went to a fall clinic for medication adjustments. We didn't discuss exercise in detail today.    Anthropometrics:  No new weight today    Weight History:   Daily Weight  12/21/24 : 62.1 kg (137 lb)  12/19/24 : 62.5 kg (137 lb 12.8 oz)  11/27/24 : 61.1 kg (134 lb 9.6 oz)  10/30/24 : 61.2 kg (135 lb)  10/16/24 : 64.9 kg (143 lb)  10/16/24 : 64.9 kg (143 lb)  10/21/24 : 62.6 kg (138 lb)  08/21/24 : 64 kg (141 lb)  06/05/24 : 63.5 kg (140 lb)  05/02/24 : 64.7 kg (142 lb 9.6 oz)    Weight Change %:  Weight History / % Weight Change: lost 100# since early 2022. Weighed 135-142# throughout 2024    Nutrition Focused Physical Exam Findings:    Performed visually    Subcutaneous Fat Loss:   Orbital Fat Pads: Well nourished (slightly  bulging fat pads)  Buccal Fat Pads: Well nourished (full, rounded cheeks)  Triceps: Well nourished (ample fat tissue)  Ribs: Defer    Muscle Wasting:  Temporalis: Well nourished (well-defined muscle)  Pectoralis (Clavicular Region): Well nourished (clavicle not visible)  Deltoid/Trapezius: Defer  Interosseous: Well nourished (muscle bulges)  Trapezius/Infraspinatus/Supraspinatus (Scapular Region): Defer  Quadriceps: Well nourished (well developed, well rounded)    Nutrition Significant Labs:  CBC Trend:   Recent Labs     12/21/24 2131 08/19/24  0859 01/15/24  1143 07/07/22  1315 06/09/22  1318   WBC 6.8 6.3 5.5   < > 8.1   NRBC 0.0 0.0  --   --  0.2   RBC 4.12 3.95* 4.21   < > 4.18   HGB 13.3 12.3 13.5   < > 13.2   HCT 39.9 39.3 41.5   < > 40.7   MCV 97 100 99   < > 97   MCH 32.3 31.1 32.1  --   --    MCHC 33.3 31.3* 32.5   < > 32.4   RDW 12.2 13.7 12.5   < > 12.9    236 247   < > 355    < > = values in this interval not displayed.   , RFP + Serum Mag Trend:   Recent Labs     12/21/24 2131 09/27/24  0856 04/30/24  0841 01/15/24  1143   GLUCOSE 161* 120* 125* 112*    141 141 140   K 4.0 4.7 4.3 4.2    107 107 104   CO2 20* 24 22 24   ANIONGAP 18 15 16 16   BUN 31* 36* 26* 31*   CREATININE 1.45* 1.39* 1.23* 1.22*   EGFR 38* 40* 46* 47*   CALCIUM 10.4* 10.2 10.2 10.6*   ALBUMIN 4.4  --  4.1 4.7   MG  --   --  1.71  --    , LFT Trend:   Recent Labs     12/21/24 2131 04/30/24  0841 01/15/24  1143   ALBUMIN 4.4 4.1 4.7   BILITOT 0.6 0.5 0.5   ALKPHOS 60 74 60   ALT 25 16 15   AST 25 14 15   PROT 7.3 6.7 7.6   , DM Specific Labs Trend (includes HgbA1C):   Recent Labs     10/16/24  1408 06/05/24  1256 02/05/24  1334   HGBA1C 6.3 6.4 6.3   , Lipid Panel Trend:    Recent Labs     04/30/24  0841 11/10/22  1025 02/15/22  1405 11/04/21  1305   CHOL 146 141 124 160   HDL 59.7 52.0 39.0* 42.0   LDLCALC 64  --   --   --    LDLF  --  65 45 89   VLDL 22 24 40 29   TRIG 110 118 198* 146   , Iron Panel + Serum  "Ferritin Trend: No results for input(s): \"IRON\", \"UIBC\", \"TIBC\", \"IRONSAT\", \"FERRITIN\" in the last 53036 hours., Vitamin B12:   Lab Results   Component Value Date    DEENDBLW38 770 04/30/2024    , Folate: No results found for: \"FOLATE\" , and Vitamin D:   Lab Results   Component Value Date    VITD25 41 04/30/2024      Medications:  Current Outpatient Medications   Medication Instructions    aspirin 81 mg EC tablet Aspirin EC 81 MG Oral Tablet Delayed Release   Refills: 0        Start : 3-Sep-2019   Active    blood sugar diagnostic (Accu-Chek Guide test strips) strip 1 strip, Daily    blood sugar diagnostic strip 150 strips, miscellaneous, 5 times daily    blood-glucose meter misc Check your blood sugar 3 to 5 times daily.    chlorthalidone (HYGROTON) 25 mg, oral, Daily    cholecalciferol (Vitamin D-3) 50 mcg (2,000 unit) capsule Vitamin D3 50 MCG (2000 UT) Oral Capsule   Refills: 0        Start : 3-Sep-2019   Active    coenzyme Q-10 200 mg capsule CoQ10 200 MG Oral Capsule   Refills: 0        Start : 3-Sep-2019   Active    cyanocobalamin, vitamin B-12, (Vitamin B-12) 1,000 mcg tablet extended release 1 tablet, Daily    diclofenac sodium (VOLTAREN) 4 g, Topical, 3 times daily PRN    empagliflozin (JARDIANCE) 25 mg, oral, Daily    fluticasone (Flonase) 50 mcg/actuation nasal spray 1 spray, Each Nostril, As needed    FreeStyle Jessica 2 [place] misc Use as instructed    FreeStyle Jessica 2 Sensor kit Use as instructed    glipiZIDE (GLUCOTROL) 5 mg, oral, 2 times daily before meals    lancets misc Accu-Chek FastClix Lancets; Use one daily    loratadine (CLARITIN ORAL) 1 tablet, Daily PRN    losartan (COZAAR) 50 mg, oral, Daily    melatonin 5 mg tablet     metFORMIN (GLUCOPHAGE) 500 mg, oral, Every 12 hours    omeprazole (PRILOSEC) 20 mg, oral, Daily    polyethylene glycol (GLYCOLAX, MIRALAX) 17 g, Daily    rosuvastatin (CRESTOR) 10 mg, oral, Nightly    spironolactone (ALDACTONE) 25 mg, oral, Daily        Nutrition Diagnosis "   Malnutrition Diagnosis  Patient has Malnutrition Diagnosis: No    Nutrition Diagnosis  Patient has Nutrition Diagnosis: Yes  Diagnosis Status (1): New  Nutrition Diagnosis 1: Food and nutrition related knowledge deficit  Related to (1): lack of adequate exposure to information and lack of trial and error with diet  As Evidenced by (1): she has been informed on restrictions for gastroparesis, but now she is not having symptoms and she hasn't done any trial of liberalizing her diet w/ monitoring of diet tolerance       Nutrition Interventions/Recommendations   Nutrition Prescription: Lower fiber, lower fat diet, small/frequent meals, carbohydrate-controlled    Nutrition Interventions:   Food and Nutrient Delivery: Meals & Snacks: Diets modified for specific foods or ingredients, Carbohydrate-modified diet     Coordination of Care: Collaboration and referral of nutrition care:  (N/A)     Nutrition Education:   Nutrition Education Content: Content related nutrition education    Nutrition Education Topics Discussed:   Reviewed Jessica 2 data which indicates she is spending 70% of time in range and 30% above range, 0% below range. She has postprandial blood sugar increases into well over 200 mg/dL. Advised she should talk with her physician about whether medications require adjustment to help control blood sugar, since she typically is not consuming meals that are rich/excessive in carbohydrate content.     Provided MNT for gastroparesis:   - Explained the texture of food that might be better tolerated, such as liquids, pureed foods, soft/moist/chopped solids or food that is chewed very well.   - Explained textures/quality of food that might not be tolerated: high fiber foods, skins/peels/membranes from produce, whole grains and fried/greasy solid foods. Explained that although solid high-fat foods (i.e. fried foods) might not be well-tolerated, liquid fats are better-tolerated (such as a soup with extra fat in it from  cream, or using heavy cream to prepare hot cereal, adding butter to melt over some soft-cooked carrots, etc.)   - Explained that high blood sugar levels can make gastroparesis worse.   - Encouraged adequate intake of food. Advised eating small/frequent meals. At a minimum, eat 3 times per day, but it might be easier to tolerate 3 meals and 2-3 snacks.   - Since she has not been having nausea, vomiting, early fullness, or lack of appetite, encouraged her to include a little more liberal intake of fiber-rich foods in her diet. Advised she can start by eating some raw fruits (I.e. apples) with the skin removed, or cucumber with skin/seeds removed. If well-tolerated, she could liberalize intake gradually, such as eating a small side salad of chopped lettuce chewed well. Encouraged including soft cooked vegetables in meals. If symptoms recur, she could back off and return to mostly pureed/very soft fruits/veg (applesauce, soft banana, canned peaches, etc.)    Educational Handouts Provided: ASPEN gastroparesis     Nutrition Counseling: Strategies: Nutrition counseling based on goal setting strategy    Readiness to Change : Good  Level of Understanding : Good  Anticipated Compliant : Good         Nutrition Monitoring and Evaluation   Food/Nutrient Related History Monitoring  Monitoring and Evaluation Plan: Fiber intake, Carbohydrate intake, Fat intake  Estimated fat intake: Estimated fat intake  Criteria: She will avoid fried foods or other greasy/high fat foods. Liquid forms of fat (regular sour cream for example) likely don't need to be limited  Estimated carbohydrate intake: Estimated carbohydrate intake  Criteria: She will include limited portions of carbohydrate-rich foods at meals/snacks in order to promote blood sugar control  Estimated fiber intake: Estimated fiber intake  Criteria: She will beign to include more fruits and vegetables in her diet, starting by removing skins (i.e. cucumber, apple) and monitor for  gastrointestinal tolerance. She will also include more cooked vegetables in her diet, such as chopped veggies in a stew/soup.              Biochemical Data, Medical Tests and Procedures  Monitoring and Evaluation Plan: Glucose/endocrine profile  Glucose/Endocrine Profile: Glucose, casual, Hemoglobin A1c (HgbA1c), Glucose, fasting                 Follow Up: PRN she will call with questions or if she desires follow up    Time Spent  Prep time on day of patient encounter: 5 minutes  Time spent directly with patient, family or caregiver: 60 minutes  Additional Time Spent on Patient Care Activities: 0 minutes  Documentation Time: 10 minutes  Other Time Spent: 0 minutes  Total: 75 minutes

## 2025-01-16 ENCOUNTER — APPOINTMENT (OUTPATIENT)
Dept: PRIMARY CARE | Facility: CLINIC | Age: 75
End: 2025-01-16
Payer: COMMERCIAL

## 2025-01-16 ENCOUNTER — NUTRITION (OUTPATIENT)
Dept: PRIMARY CARE | Facility: CLINIC | Age: 75
End: 2025-01-16
Payer: COMMERCIAL

## 2025-01-16 ENCOUNTER — TELEPHONE (OUTPATIENT)
Dept: PRIMARY CARE | Facility: CLINIC | Age: 75
End: 2025-01-16

## 2025-01-16 DIAGNOSIS — E08.21 DIABETES MELLITUS DUE TO UNDERLYING CONDITION, CONTROLLED, WITH DIABETIC NEPHROPATHY, WITHOUT LONG-TERM CURRENT USE OF INSULIN: ICD-10-CM

## 2025-01-16 DIAGNOSIS — E11.43 GASTROPARESIS DUE TO DM (MULTI): Primary | ICD-10-CM

## 2025-01-16 DIAGNOSIS — K31.84 GASTROPARESIS DUE TO DM (MULTI): Primary | ICD-10-CM

## 2025-01-16 DIAGNOSIS — E11.9 CONTROLLED TYPE 2 DIABETES MELLITUS WITHOUT COMPLICATION, WITHOUT LONG-TERM CURRENT USE OF INSULIN (MULTI): ICD-10-CM

## 2025-01-16 PROCEDURE — 97802 MEDICAL NUTRITION INDIV IN: CPT | Performed by: DIETITIAN, REGISTERED

## 2025-01-16 NOTE — TELEPHONE ENCOUNTER
Needs Freestyle Jessica 2 Sensors ONLY- not the kit. Send order to Mail order Pharmacy: [facility] - chart is updated.

## 2025-01-17 NOTE — PATIENT INSTRUCTIONS
Reviewed Jessica 2 data which indicates she is spending 70% of time in range and 30% above range, 0% below range. She has postprandial blood sugar increases into well over 200 mg/dL. Advised she should talk with her physician about whether medications require adjustment to help control blood sugar, since she typically is not consuming meals that are rich/excessive in carbohydrate content.     Provided MNT for gastroparesis:   - Explained the texture of food that might be better tolerated, such as liquids, pureed foods, soft/moist/chopped solids or food that is chewed very well.   - Explained textures/quality of food that might not be tolerated: high fiber foods, skins/peels/membranes from produce, whole grains and fried/greasy solid foods. Explained that although solid high-fat foods (i.e. fried foods) might not be well-tolerated, liquid fats are better-tolerated (such as a soup with extra fat in it from cream, or using heavy cream to prepare hot cereal, adding butter to melt over some soft-cooked carrots, etc.)   - Explained that high blood sugar levels can make gastroparesis worse.   - Encouraged adequate intake of food. Advised eating small/frequent meals. At a minimum, eat 3 times per day, but it might be easier to tolerate 3 meals and 2-3 snacks.   - Since she has not been having nausea, vomiting, early fullness, or lack of appetite, encouraged her to include a little more liberal intake of fiber-rich foods in her diet. Advised she can start by eating some raw fruits (I.e. apples) with the skin removed, or cucumber with skin/seeds removed. If well-tolerated, she could liberalize intake gradually, such as eating a small side salad of chopped lettuce chewed well. Encouraged including soft cooked vegetables in meals. If symptoms recur, she could back off and return to mostly pureed/very soft fruits/veg (applesauce, soft banana, canned peaches, etc.)

## 2025-01-20 RX ORDER — FLASH GLUCOSE SENSOR
KIT MISCELLANEOUS
Qty: 6 EACH | Refills: 1 | Status: SHIPPED | OUTPATIENT
Start: 2025-01-20

## 2025-01-23 ENCOUNTER — APPOINTMENT (OUTPATIENT)
Dept: ORTHOPEDIC SURGERY | Facility: CLINIC | Age: 75
End: 2025-01-23
Payer: COMMERCIAL

## 2025-01-24 ENCOUNTER — LAB (OUTPATIENT)
Dept: LAB | Facility: LAB | Age: 75
End: 2025-01-24
Payer: COMMERCIAL

## 2025-01-24 DIAGNOSIS — N18.30 STAGE 3 CHRONIC KIDNEY DISEASE, UNSPECIFIED WHETHER STAGE 3A OR 3B CKD (MULTI): ICD-10-CM

## 2025-01-24 DIAGNOSIS — C34.11 MALIGNANT NEOPLASM OF UPPER LOBE OF RIGHT LUNG (MULTI): ICD-10-CM

## 2025-01-24 LAB
ALBUMIN SERPL BCP-MCNC: 4.4 G/DL (ref 3.4–5)
ALP SERPL-CCNC: 62 U/L (ref 33–136)
ALT SERPL W P-5'-P-CCNC: 15 U/L (ref 7–45)
ANION GAP SERPL CALC-SCNC: 10 MMOL/L (ref 10–20)
AST SERPL W P-5'-P-CCNC: 17 U/L (ref 9–39)
BASOPHILS # BLD AUTO: 0.03 X10*3/UL (ref 0–0.1)
BASOPHILS NFR BLD AUTO: 0.5 %
BILIRUB SERPL-MCNC: 0.6 MG/DL (ref 0–1.2)
BUN SERPL-MCNC: 33 MG/DL (ref 6–23)
CALCIUM SERPL-MCNC: 10.3 MG/DL (ref 8.6–10.3)
CHLORIDE SERPL-SCNC: 107 MMOL/L (ref 98–107)
CO2 SERPL-SCNC: 26 MMOL/L (ref 21–32)
CREAT SERPL-MCNC: 1.2 MG/DL (ref 0.5–1.05)
EGFRCR SERPLBLD CKD-EPI 2021: 48 ML/MIN/1.73M*2
EOSINOPHIL # BLD AUTO: 0.4 X10*3/UL (ref 0–0.4)
EOSINOPHIL NFR BLD AUTO: 6.2 %
ERYTHROCYTE [DISTWIDTH] IN BLOOD BY AUTOMATED COUNT: 12.1 % (ref 11.5–14.5)
GLUCOSE SERPL-MCNC: 159 MG/DL (ref 74–99)
HCT VFR BLD AUTO: 39.5 % (ref 36–46)
HGB BLD-MCNC: 12.6 G/DL (ref 12–16)
IMM GRANULOCYTES # BLD AUTO: 0.01 X10*3/UL (ref 0–0.5)
IMM GRANULOCYTES NFR BLD AUTO: 0.2 % (ref 0–0.9)
LYMPHOCYTES # BLD AUTO: 2.06 X10*3/UL (ref 0.8–3)
LYMPHOCYTES NFR BLD AUTO: 31.9 %
MCH RBC QN AUTO: 31.3 PG (ref 26–34)
MCHC RBC AUTO-ENTMCNC: 31.9 G/DL (ref 32–36)
MCV RBC AUTO: 98 FL (ref 80–100)
MONOCYTES # BLD AUTO: 0.5 X10*3/UL (ref 0.05–0.8)
MONOCYTES NFR BLD AUTO: 7.8 %
NEUTROPHILS # BLD AUTO: 3.45 X10*3/UL (ref 1.6–5.5)
NEUTROPHILS NFR BLD AUTO: 53.4 %
NRBC BLD-RTO: 0 /100 WBCS (ref 0–0)
PLATELET # BLD AUTO: 247 X10*3/UL (ref 150–450)
POTASSIUM SERPL-SCNC: 4.3 MMOL/L (ref 3.5–5.3)
PROT SERPL-MCNC: 6.7 G/DL (ref 6.4–8.2)
RBC # BLD AUTO: 4.02 X10*6/UL (ref 4–5.2)
SODIUM SERPL-SCNC: 139 MMOL/L (ref 136–145)
WBC # BLD AUTO: 6.5 X10*3/UL (ref 4.4–11.3)

## 2025-01-24 PROCEDURE — 80053 COMPREHEN METABOLIC PANEL: CPT

## 2025-01-24 PROCEDURE — 85025 COMPLETE CBC W/AUTO DIFF WBC: CPT

## 2025-01-27 ENCOUNTER — TELEPHONE (OUTPATIENT)
Dept: PRIMARY CARE | Facility: CLINIC | Age: 75
End: 2025-01-27
Payer: COMMERCIAL

## 2025-01-27 ENCOUNTER — HOSPITAL ENCOUNTER (OUTPATIENT)
Dept: RADIOLOGY | Facility: HOSPITAL | Age: 75
Discharge: HOME | End: 2025-01-27
Payer: COMMERCIAL

## 2025-01-27 DIAGNOSIS — C34.11 MALIGNANT NEOPLASM OF UPPER LOBE OF RIGHT LUNG (MULTI): ICD-10-CM

## 2025-01-27 PROCEDURE — 2550000001 HC RX 255 CONTRASTS: Performed by: INTERNAL MEDICINE

## 2025-01-27 PROCEDURE — 74177 CT ABD & PELVIS W/CONTRAST: CPT | Performed by: RADIOLOGY

## 2025-01-27 PROCEDURE — 71260 CT THORAX DX C+: CPT | Performed by: RADIOLOGY

## 2025-01-27 PROCEDURE — 74177 CT ABD & PELVIS W/CONTRAST: CPT

## 2025-01-27 RX ADMIN — IOHEXOL 75 ML: 350 INJECTION, SOLUTION INTRAVENOUS at 11:54

## 2025-01-28 ENCOUNTER — OFFICE VISIT (OUTPATIENT)
Dept: HEMATOLOGY/ONCOLOGY | Facility: CLINIC | Age: 75
End: 2025-01-28
Payer: COMMERCIAL

## 2025-01-28 VITALS
TEMPERATURE: 97.2 F | HEART RATE: 95 BPM | WEIGHT: 145.6 LBS | BODY MASS INDEX: 26.63 KG/M2 | RESPIRATION RATE: 18 BRPM | SYSTOLIC BLOOD PRESSURE: 154 MMHG | OXYGEN SATURATION: 95 % | DIASTOLIC BLOOD PRESSURE: 81 MMHG

## 2025-01-28 DIAGNOSIS — C34.11 MALIGNANT NEOPLASM OF UPPER LOBE OF RIGHT LUNG (MULTI): ICD-10-CM

## 2025-01-28 PROCEDURE — 1157F ADVNC CARE PLAN IN RCRD: CPT | Performed by: INTERNAL MEDICINE

## 2025-01-28 PROCEDURE — 1126F AMNT PAIN NOTED NONE PRSNT: CPT | Performed by: INTERNAL MEDICINE

## 2025-01-28 PROCEDURE — 1123F ACP DISCUSS/DSCN MKR DOCD: CPT | Performed by: INTERNAL MEDICINE

## 2025-01-28 PROCEDURE — 1159F MED LIST DOCD IN RCRD: CPT | Performed by: INTERNAL MEDICINE

## 2025-01-28 PROCEDURE — 99215 OFFICE O/P EST HI 40 MIN: CPT | Performed by: INTERNAL MEDICINE

## 2025-01-28 PROCEDURE — 4010F ACE/ARB THERAPY RXD/TAKEN: CPT | Performed by: INTERNAL MEDICINE

## 2025-01-28 PROCEDURE — 1036F TOBACCO NON-USER: CPT | Performed by: INTERNAL MEDICINE

## 2025-01-28 PROCEDURE — 3077F SYST BP >= 140 MM HG: CPT | Performed by: INTERNAL MEDICINE

## 2025-01-28 PROCEDURE — 3079F DIAST BP 80-89 MM HG: CPT | Performed by: INTERNAL MEDICINE

## 2025-01-28 ASSESSMENT — PAIN SCALES - GENERAL: PAINLEVEL_OUTOF10: 0-NO PAIN

## 2025-01-28 NOTE — PROGRESS NOTES
Continues high-dose statin since patient has no side effect from it   Patient ID: Nan Crane is a 75 y.o. female.  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 The patient was referred by Dr. Aicha Hamlin.    02/15/2022: CT Chest showed a previous 1.6 x 1.8 x 0.6 cm ground-glass nodule in the right upper lobe, predominantly solid and appears slightly larger in size.   03/17/2022: PET/CT (FDG) which revealed a mildly hypermetabolic right upper lobe pulmonary nodule (similar in size compared to prior measuring 0.9 x 0.8 cm), consistent with pulmonary primary neoplasm versus benign etiology.   04/28/2022: Pt underwent a right thoracoscopic upper lobe wedge with lobectomy and mediastinal lymph node dissection which revealed an atypical carcinoid neuroendocrine tumor (Grade 2, T1a N2, and Ki67 at 15%) of the right upper lung with metastatic neuroendocrine  tumor at 10R and 4R lymph nodes (2/5).     06/09/2022: Pt had a Chromogranin A level at 72289 ng/mL (reference < 311) and an elevated 5-HIAA (plasma) at 3 ng/mL   07/07/2022: Pt had a high Chromogranin A level at 33922 ng/mL (reference < 311) and an elevated 5-HIAA (plasma) at 26 ng/mL    07/13/2022: PET/CT (Cu-64) s/p right upper lobe wedge resection which revealed Cu-64 dotatate avid focus involves C3 posterior elements, highly concerning for bone metastasis until proven otherwise.   07/18/2022: Pt underwent MRI of cervical spine which showed slightly irregular contour of the posterior elements/spinous processes of C3 and C4, with a subtle focus of centrally nonenhancing, peripherally enhancing focus of dark T1 and T2 signal present  within the posterior epidural space at the level of C3-C4, corresponding  to focus of radiotracer uptake described on previous PET-CT dated 07/13/2022, without significant surrounding soft tissue edema or definitive evidence of marrow signal changes. This  is favored to possibly represent sequela of prior trauma or degenerative changes, less likely metastasis.    Repeated C-spine MRI on 09/12/2022 with increased enhancement in C4 lesion.   10/06/2022: PET/CT (Cu-64) which revealed stable postsurgical changes of right upper lobectomy, as well as mild interval decrease in degree of focal Cu 64 dotatate uptake involving the right lamina of C3. Findings are nonspecific however, continued attention  on follow-up is advised.   Today: She is feeling well, no abdominal pain, no nausea or vomiting. Normal BMs. No new symptoms          Subjective    Patient is feeling well. Denies abdominal pain, nausea or vomiting. No new symptoms.        Objective   BSA: 1.7 meters squared  /81 (BP Location: Left arm, Patient Position: Sitting)   Pulse 95   Temp 36.2 °C (97.2 °F) (Temporal)   Resp 18   Wt 66 kg (145 lb 9.6 oz)   SpO2 95%   BMI 26.63 kg/m²       Nan Crane  reports that she has never smoked. She has never used smokeless tobacco.  She  reports that she does not currently use alcohol.  She  reports no history of drug use.    ROS:  All 14 systems have been reviewed and were negative except for the HPI.     Physical Exam:  General: Appears well and in NAD  Eyes: PERRL, EOMI, clear sclera  ENMT: mucous membranes moist, no apparent injury, no lesions seen  Head/Neck: Neck supple, no apparent injury, thyroid without mass or tenderness, No JVD, trachea midline,   Thorax: Patent airways, CTAB, normal breath sounds with good chest expansion, thorax symmetric  Cardiovascular: Regular, rate and rhythm, no murmurs, 2+ equal pulses of the extremities, normal S 1and S 2  Gastrointestinal: Nondistended, soft, non-tender, no rebound tenderness or guarding, no masses palpable, no organomegaly,  +BS  Musculoskeletal: ROM intact, no joint swelling, normal strength  Extremities: normal extremities, no cyanosis edema, contusions or wounds, no clubbing  Neurological: alert and oriented x3, intact senses, motor, response and reflexes, normal strength  Lymphatic: No significant lymphadenopathy  Psychological: Appropriate mood and behavior  Skin: Warm and dry, no lesions, no rashes     Performance Status:  Asymptomatic    Lab Results:  I have reviewed these laboratory results:     Lab Results   Component Value Date    WBC 6.5 01/24/2025    HGB 12.6 01/24/2025    HCT 39.5 01/24/2025    MCV 98 01/24/2025     01/24/2025         Chemistry    Lab Results   Component Value Date/Time     01/24/2025 1119    K 4.3 01/24/2025 1119     01/24/2025 1119    CO2 26 01/24/2025 1119    BUN 33 (H) 01/24/2025 1119    CREATININE 1.20 (H) 01/24/2025 1119    Lab Results   Component Value Date/Time    CALCIUM 10.3 01/24/2025 1119    ALKPHOS 62 01/24/2025 1119    AST 17 01/24/2025 1119    ALT 15 01/24/2025 1119    BILITOT 0.6 01/24/2025 1119            Lab Results   Component Value Date    TSH 3.36 09/27/2024        Radiology Result:  I have reviewed the latest Imaging in PACS and the findings are noted in this note. I discussed the results of the latest imaging with the patient. All previous imaging were reviewed at the time it was completed. Full records are available in the EMR for review as well.     === 01/15/24 ===    CT CHEST W IV CONTRAST    - Impression -  Status post resection of the right lobe. No obvious recurrent tumor  or metastatic disease.    Signed by: Sofi Gomez 1/16/2024 1:10 PM  Dictation workstation:   [id number]    === 09/12/22 ===    MR CERVICAL SPINE W AND WO CONTRAST    - Impression -  1. The C4 spinous process has a focus of a edema and enhancement more  prominent/circumscribed than noted on the prior exam. There is edema  with some enhancement in the adjacent soft tissues similar to  the  prior exam. The findings could represent underlying tumor in the  spinous process. Trauma is not completely excluded.    2. Degenerative changes elsewhere as noted similar to the prior exam.    CT chest w IV contrast    Result Date: 1/16/2024  Impression: Status post resection of the right lobe. No obvious recurrent tumor or metastatic disease.   Signed by: Sofi Gomez 1/16/2024 1:10 PM Dictation workstation:   [id number]        Pathology Results:  I have reviewed the full pathology report recorded in the EMR. The pertinent portions indicating diagnosis are listed here in the note. for details please refer to the full report recorded in the EMR.    Assessment and Plan:     Assessment and Plan:   Assessment:    75-year-old female presenting with atypical carcinoid Stage IIIA neuroendocrine tumor (Grade 2, [id number], Ki67 15%) of lung s/p RUL lobectomy on 04/28/2022  with negative margins. The patient was referred by Dr. Aicha Hamlin.      10/06/2022: PET/CT (Cu-64) which revealed stable postsurgical changes of right upper lobectomy, as well as mild interval decrease in degree of focal Cu 64 dotatate uptake involving the right  lamina of C3. Findings are nonspecific however, continued attention on follow-up is advised. Pt had cervical MRI which showed same area looks due to degenerative changes.     03/2023: Restaging scans excluded recurrent or metastatic disease and there is only Soft tissue density along the wedge resection lateral to the upper mediastinum not present  on previous study    01/2024 and 01/2025: Restaging scans excluded any recurrent or metastatic disease      Plan:    1- CT Chest in 12 mnonth                 Mr. Nan RICHEY May ,  It was a pleasure talking to you today.    Our offices will be reaching out to you to make all the appointments. I am always available at the phone numbers listed below for an earlier appointment should you wish one.    In case of an emergency please dial 911 or report  to your nearest Emergency Room.  For all other questions, please do not hesitate to reach out to us at the number listed below.    Thank you for choosing [facility].   We appreciate your visit.       Mayra Vines M.D.   and Director of the Neuroendocrine Tumor Program  Gastrointestinal Medical Oncology  Department of Hematology and Oncology  [facility], [place] [address]  Phone (Office): [phone]  Fax:  [phone]   Email: [email or web address]  Learn more about [facility] Comprehensive Neuroendocrine Tumor Program: Click Here  Learn more about [place] [employer]: [email or web address]

## 2025-01-31 ENCOUNTER — TELEPHONE (OUTPATIENT)
Dept: ADMISSION | Facility: HOSPITAL | Age: 75
End: 2025-01-31
Payer: COMMERCIAL

## 2025-01-31 NOTE — TELEPHONE ENCOUNTER
The patient called in, the final report for the CT scan came in and she just wants some clarification on results, I did indicate that per the impression it looks good however she wants more detailed explanations.     Below is the impression:    IMPRESSION:  Changes of previous right upper lobectomy with mild scarring similar  to prior.      No new evidence to suggest metastatic disease throughout the chest,  abdomen and pelvis.

## 2025-01-31 NOTE — TELEPHONE ENCOUNTER
Per Dr. Vines, there is no evidence of disease or concerning findings, patient made aware and is appreciative of the information

## 2025-02-03 ENCOUNTER — APPOINTMENT (OUTPATIENT)
Dept: RADIOLOGY | Facility: HOSPITAL | Age: 75
End: 2025-02-03
Payer: COMMERCIAL

## 2025-02-04 ENCOUNTER — APPOINTMENT (OUTPATIENT)
Dept: HEMATOLOGY/ONCOLOGY | Facility: CLINIC | Age: 75
End: 2025-02-04
Payer: COMMERCIAL

## 2025-02-05 ENCOUNTER — OFFICE VISIT (OUTPATIENT)
Dept: ORTHOPEDIC SURGERY | Facility: CLINIC | Age: 75
End: 2025-02-05
Payer: COMMERCIAL

## 2025-02-05 VITALS — HEIGHT: 62 IN | WEIGHT: 145 LBS | BODY MASS INDEX: 26.68 KG/M2

## 2025-02-05 DIAGNOSIS — M19.019 GLENOHUMERAL ARTHRITIS: Primary | ICD-10-CM

## 2025-02-05 PROCEDURE — 1157F ADVNC CARE PLAN IN RCRD: CPT | Performed by: ORTHOPAEDIC SURGERY

## 2025-02-05 PROCEDURE — 1123F ACP DISCUSS/DSCN MKR DOCD: CPT | Performed by: ORTHOPAEDIC SURGERY

## 2025-02-05 PROCEDURE — 99213 OFFICE O/P EST LOW 20 MIN: CPT | Performed by: ORTHOPAEDIC SURGERY

## 2025-02-05 PROCEDURE — 4010F ACE/ARB THERAPY RXD/TAKEN: CPT | Performed by: ORTHOPAEDIC SURGERY

## 2025-02-05 PROCEDURE — 99215 OFFICE O/P EST HI 40 MIN: CPT | Performed by: ORTHOPAEDIC SURGERY

## 2025-02-05 PROCEDURE — 3008F BODY MASS INDEX DOCD: CPT | Performed by: ORTHOPAEDIC SURGERY

## 2025-02-05 NOTE — PROGRESS NOTES
History of present illness: History of right shoulder pain    She had x-rays last March and had subsequent MRI x-rays did not look too bad but MRI shows complete loss supra infraspinatus retraction and muscle atrophy and wasting making her not unreconstructable we tried cortisone shots but it did not help she has severe pain drop arm sign weakness now night pain and inability to do ADLs    Recently she was given good news that she is 3 years out from her lobe resection and her cancer seems to be clear she like to proceed with shoulder replacement due to significant shoulder pain    Physical exam:    General: No acute distress or breathing difficulty or discomfort, pleasant and cooperative with the examination.    Extremities: The right shoulder was inspected and was found to have no obvious deformity.  There was tenderness to touch over the lateral edges of the shoulder over the rotator cuff insertion.  Active forward flexion 110 degrees, external rotation to 50 degrees, abduction to 45 degrees, and internal rotation to the level of L2.    At this time the shoulder is neurovascular tact and neurosensory intact.  Motor intact C5-T1.  There was no obvious neck pain or radiculopathy noted.  There was no gross instability or adhesive capsulitis symptoms.  There was no evidence of apprehension or apprehension suppression.    Strength was tested in 4 planes with weakness in the supraspinatus strength testing and external rotation position.  There was no strength deficit in internal rotation.  Impingement signs were positive both supine and standing for impingement test type I and II.  There was mild pain over the bicipital groove with a positive speeds sign    Diagnostic studies: No new x-ray    Impression: Right shoulder not unreconstructable rotator cuff tearing some mild secondary arthritic changes developing    Plan: Recommend reverse total shoulder replacement    PT therapy rehab program discussed alternatives to  surgery discussed she had injections in October without help    She is having trouble with ADLs    Will see her on the operative schedule at [facility] she may need some pulmonary support as she has had a 40% lobe resection    Risk and benefits of surgery discussed extensively with the patient.    Surgical risk included but were not limited to infection, wear, loosening, need for further surgery blood clot, failure to heal, failure of the surgery, stiffness, loss of limb life, extremity function change in length change, and associated risks of  any surgery.

## 2025-02-06 ENCOUNTER — APPOINTMENT (OUTPATIENT)
Dept: HEMATOLOGY/ONCOLOGY | Facility: CLINIC | Age: 75
End: 2025-02-06
Payer: COMMERCIAL

## 2025-02-18 ENCOUNTER — APPOINTMENT (OUTPATIENT)
Dept: ENDOCRINOLOGY | Facility: CLINIC | Age: 75
End: 2025-02-18

## 2025-02-20 ENCOUNTER — APPOINTMENT (OUTPATIENT)
Dept: PRIMARY CARE | Facility: CLINIC | Age: 75
End: 2025-02-20
Payer: COMMERCIAL

## 2025-02-20 VITALS
OXYGEN SATURATION: 96 % | BODY MASS INDEX: 27.09 KG/M2 | RESPIRATION RATE: 16 BRPM | SYSTOLIC BLOOD PRESSURE: 124 MMHG | HEIGHT: 62 IN | TEMPERATURE: 97.2 F | HEART RATE: 95 BPM | DIASTOLIC BLOOD PRESSURE: 78 MMHG | WEIGHT: 147.2 LBS

## 2025-02-20 DIAGNOSIS — F32.1 DEPRESSION, MAJOR, SINGLE EPISODE, MODERATE (MULTI): ICD-10-CM

## 2025-02-20 DIAGNOSIS — E78.49 OTHER HYPERLIPIDEMIA: ICD-10-CM

## 2025-02-20 DIAGNOSIS — Z13.820 SCREENING FOR OSTEOPOROSIS: ICD-10-CM

## 2025-02-20 DIAGNOSIS — M81.0 AGE-RELATED OSTEOPOROSIS WITHOUT CURRENT PATHOLOGICAL FRACTURE: ICD-10-CM

## 2025-02-20 DIAGNOSIS — E55.9 VITAMIN D DEFICIENCY: ICD-10-CM

## 2025-02-20 DIAGNOSIS — E08.21 DIABETES MELLITUS DUE TO UNDERLYING CONDITION, CONTROLLED, WITH DIABETIC NEPHROPATHY, WITHOUT LONG-TERM CURRENT USE OF INSULIN: ICD-10-CM

## 2025-02-20 DIAGNOSIS — Z00.00 MEDICARE ANNUAL WELLNESS VISIT, SUBSEQUENT: Primary | ICD-10-CM

## 2025-02-20 DIAGNOSIS — E11.22 TYPE 2 DIABETES MELLITUS WITH STAGE 3B CHRONIC KIDNEY DISEASE, WITHOUT LONG-TERM CURRENT USE OF INSULIN (MULTI): ICD-10-CM

## 2025-02-20 DIAGNOSIS — N18.32 TYPE 2 DIABETES MELLITUS WITH STAGE 3B CHRONIC KIDNEY DISEASE, WITHOUT LONG-TERM CURRENT USE OF INSULIN (MULTI): ICD-10-CM

## 2025-02-20 DIAGNOSIS — Z12.31 ENCOUNTER FOR SCREENING MAMMOGRAM FOR MALIGNANT NEOPLASM OF BREAST: ICD-10-CM

## 2025-02-20 DIAGNOSIS — I10 PRIMARY HYPERTENSION: ICD-10-CM

## 2025-02-20 DIAGNOSIS — E11.9 CONTROLLED TYPE 2 DIABETES MELLITUS WITHOUT COMPLICATION, WITHOUT LONG-TERM CURRENT USE OF INSULIN (MULTI): ICD-10-CM

## 2025-02-20 DIAGNOSIS — N18.30 STAGE 3 CHRONIC KIDNEY DISEASE, UNSPECIFIED WHETHER STAGE 3A OR 3B CKD (MULTI): ICD-10-CM

## 2025-02-20 LAB — POC HEMOGLOBIN A1C: 7.4 % (ref 4.2–6.5)

## 2025-02-20 PROCEDURE — 1159F MED LIST DOCD IN RCRD: CPT | Performed by: INTERNAL MEDICINE

## 2025-02-20 PROCEDURE — 1170F FXNL STATUS ASSESSED: CPT | Performed by: INTERNAL MEDICINE

## 2025-02-20 PROCEDURE — 1160F RVW MEDS BY RX/DR IN RCRD: CPT | Performed by: INTERNAL MEDICINE

## 2025-02-20 PROCEDURE — 4010F ACE/ARB THERAPY RXD/TAKEN: CPT | Performed by: INTERNAL MEDICINE

## 2025-02-20 PROCEDURE — 3074F SYST BP LT 130 MM HG: CPT | Performed by: INTERNAL MEDICINE

## 2025-02-20 PROCEDURE — 83036 HEMOGLOBIN GLYCOSYLATED A1C: CPT | Performed by: INTERNAL MEDICINE

## 2025-02-20 PROCEDURE — 1123F ACP DISCUSS/DSCN MKR DOCD: CPT | Performed by: INTERNAL MEDICINE

## 2025-02-20 PROCEDURE — 1036F TOBACCO NON-USER: CPT | Performed by: INTERNAL MEDICINE

## 2025-02-20 PROCEDURE — G0439 PPPS, SUBSEQ VISIT: HCPCS | Performed by: INTERNAL MEDICINE

## 2025-02-20 PROCEDURE — 3008F BODY MASS INDEX DOCD: CPT | Performed by: INTERNAL MEDICINE

## 2025-02-20 PROCEDURE — 3078F DIAST BP <80 MM HG: CPT | Performed by: INTERNAL MEDICINE

## 2025-02-20 PROCEDURE — 99214 OFFICE O/P EST MOD 30 MIN: CPT | Performed by: INTERNAL MEDICINE

## 2025-02-20 PROCEDURE — 1157F ADVNC CARE PLAN IN RCRD: CPT | Performed by: INTERNAL MEDICINE

## 2025-02-20 PROCEDURE — 1158F ADVNC CARE PLAN TLK DOCD: CPT | Performed by: INTERNAL MEDICINE

## 2025-02-20 RX ORDER — METFORMIN HYDROCHLORIDE 750 MG/1
750 TABLET ORAL EVERY 12 HOURS
Qty: 180 TABLET | Refills: 1 | Status: SHIPPED | OUTPATIENT
Start: 2025-02-20 | End: 2026-02-20

## 2025-02-20 ASSESSMENT — ACTIVITIES OF DAILY LIVING (ADL)
MANAGING_FINANCES: INDEPENDENT
DOING_HOUSEWORK: INDEPENDENT
DRESSING: INDEPENDENT
BATHING: INDEPENDENT
GROCERY_SHOPPING: INDEPENDENT
TAKING_MEDICATION: INDEPENDENT

## 2025-02-20 ASSESSMENT — ENCOUNTER SYMPTOMS
ABDOMINAL PAIN: 0
UNEXPECTED WEIGHT CHANGE: 0
SLEEP DISTURBANCE: 1
WHEEZING: 0
NAUSEA: 0
DIARRHEA: 0
WEAKNESS: 0
DYSURIA: 0
CHILLS: 0
ADENOPATHY: 0
CONFUSION: 0
NECK PAIN: 1
ARTHRALGIAS: 1
CHEST TIGHTNESS: 0
PALPITATIONS: 0
CONSTIPATION: 0
COUGH: 0
SORE THROAT: 0
VOMITING: 0
DIZZINESS: 0
SHORTNESS OF BREATH: 0
FATIGUE: 0
JOINT SWELLING: 0

## 2025-02-20 NOTE — ASSESSMENT & PLAN NOTE
Orders:    Magnesium; Future    Vitamin D 25-Hydroxy,Total (for eval of Vitamin D levels); Future

## 2025-02-20 NOTE — PATIENT INSTRUCTIONS
Was nice seeing you today.  Continue same medication.  Have lab work done before next appointment if labs were ordered today.  Fu in 3 month/ dm/ cpe  Call/ contact our office with any concerns.    If you have labs or test done and you can't see the report in your chart or you didn't hear from us in 2 weeks after test/labs done , please, call our office for reports.  Please , do not assume that they were normal.    Any test results  and questions you might have , will be discussed at next visit -- please make sure to make a follow up appt after testing if reports are abnormal or you have questions.

## 2025-02-20 NOTE — ASSESSMENT & PLAN NOTE
Condition stable, controlled with current medication, no medication side effects, continue same treatment,call if any  new symptoms develops. Follow up with specialty service as needed or advised.  Controlled on current medication

## 2025-02-20 NOTE — ASSESSMENT & PLAN NOTE
Orders:    CBC and Auto Differential; Future    Hepatitis C Antibody; Future    Comprehensive Metabolic Panel; Future    Albumin-Creatinine Ratio, Urine Random; Future    Albumin-Creatinine Ratio, Urine Random; Future

## 2025-02-24 DIAGNOSIS — Z00.00 HEALTH CARE MAINTENANCE: ICD-10-CM

## 2025-02-24 RX ORDER — SPIRONOLACTONE 25 MG/1
25 TABLET ORAL DAILY
Qty: 90 TABLET | Refills: 2 | Status: SHIPPED | OUTPATIENT
Start: 2025-02-24

## 2025-03-12 PROBLEM — M75.121 COMPLETE ROTATOR CUFF TEAR OR RUPTURE OF RIGHT SHOULDER, NOT SPECIFIED AS TRAUMATIC: Status: ACTIVE | Noted: 2025-03-11

## 2025-03-12 PROBLEM — M19.011 PRIMARY OSTEOARTHRITIS, RIGHT SHOULDER: Status: ACTIVE | Noted: 2025-03-11

## 2025-03-19 ENCOUNTER — APPOINTMENT (OUTPATIENT)
Dept: PRIMARY CARE | Facility: CLINIC | Age: 75
End: 2025-03-19
Payer: COMMERCIAL

## 2025-03-31 ENCOUNTER — TELEPHONE (OUTPATIENT)
Dept: PRIMARY CARE | Facility: CLINIC | Age: 75
End: 2025-03-31
Payer: COMMERCIAL

## 2025-03-31 DIAGNOSIS — B37.31 VAGINITIS DUE TO CANDIDA: ICD-10-CM

## 2025-03-31 DIAGNOSIS — B37.31 VAGINITIS DUE TO CANDIDA: Primary | ICD-10-CM

## 2025-03-31 RX ORDER — FLUCONAZOLE 150 MG/1
150 TABLET ORAL ONCE
Qty: 1 TABLET | Refills: 0 | Status: SHIPPED | OUTPATIENT
Start: 2025-03-31 | End: 2025-03-31 | Stop reason: SDUPTHER

## 2025-03-31 NOTE — TELEPHONE ENCOUNTER
Patient states she has a yeast infection and is requesting RX Fluconazole 150 mg    Pharmacy [facility]     Please advise      Thank you !

## 2025-04-01 RX ORDER — FLUCONAZOLE 150 MG/1
150 TABLET ORAL ONCE
Qty: 1 TABLET | Refills: 0 | Status: SHIPPED | OUTPATIENT
Start: 2025-04-01 | End: 2025-04-04 | Stop reason: WASHOUT

## 2025-04-02 ENCOUNTER — APPOINTMENT (OUTPATIENT)
Dept: PRIMARY CARE | Facility: CLINIC | Age: 75
End: 2025-04-02
Payer: COMMERCIAL

## 2025-04-03 ENCOUNTER — PRE-ADMISSION TESTING (OUTPATIENT)
Dept: PREADMISSION TESTING | Facility: HOSPITAL | Age: 75
End: 2025-04-03
Payer: COMMERCIAL

## 2025-04-03 ENCOUNTER — HOSPITAL ENCOUNTER (OUTPATIENT)
Dept: CARDIOLOGY | Facility: HOSPITAL | Age: 75
Discharge: HOME | End: 2025-04-03
Payer: COMMERCIAL

## 2025-04-03 VITALS
HEIGHT: 62 IN | HEART RATE: 86 BPM | DIASTOLIC BLOOD PRESSURE: 78 MMHG | WEIGHT: 154.54 LBS | BODY MASS INDEX: 28.44 KG/M2 | RESPIRATION RATE: 16 BRPM | SYSTOLIC BLOOD PRESSURE: 129 MMHG

## 2025-04-03 DIAGNOSIS — M19.011 PRIMARY OSTEOARTHRITIS, RIGHT SHOULDER: ICD-10-CM

## 2025-04-03 DIAGNOSIS — M75.121 COMPLETE ROTATOR CUFF TEAR OR RUPTURE OF RIGHT SHOULDER, NOT SPECIFIED AS TRAUMATIC: ICD-10-CM

## 2025-04-03 LAB
25(OH)D3 SERPL-MCNC: 36 NG/ML (ref 30–100)
ALBUMIN SERPL BCP-MCNC: 4.8 G/DL (ref 3.4–5)
ALP SERPL-CCNC: 73 U/L (ref 33–136)
ALT SERPL W P-5'-P-CCNC: 12 U/L (ref 7–45)
ANION GAP SERPL CALC-SCNC: 17 MMOL/L (ref 10–20)
APPEARANCE UR: CLEAR
APTT PPP: 26 SECONDS (ref 26–36)
AST SERPL W P-5'-P-CCNC: 20 U/L (ref 9–39)
BASOPHILS # BLD AUTO: 0.03 X10*3/UL (ref 0–0.1)
BASOPHILS NFR BLD AUTO: 0.5 %
BILIRUB SERPL-MCNC: 0.6 MG/DL (ref 0–1.2)
BILIRUB UR STRIP.AUTO-MCNC: NEGATIVE MG/DL
BUN SERPL-MCNC: 38 MG/DL (ref 6–23)
CALCIUM SERPL-MCNC: 10.6 MG/DL (ref 8.6–10.3)
CHLORIDE SERPL-SCNC: 105 MMOL/L (ref 98–107)
CHOLEST SERPL-MCNC: 169 MG/DL (ref 0–199)
CHOLESTEROL/HDL RATIO: 2.6
CO2 SERPL-SCNC: 21 MMOL/L (ref 21–32)
COLOR UR: ABNORMAL
CREAT SERPL-MCNC: 1.31 MG/DL (ref 0.5–1.05)
CREAT UR-MCNC: 77.3 MG/DL (ref 20–320)
EGFRCR SERPLBLD CKD-EPI 2021: 43 ML/MIN/1.73M*2
EOSINOPHIL # BLD AUTO: 0.44 X10*3/UL (ref 0–0.4)
EOSINOPHIL NFR BLD AUTO: 6.8 %
ERYTHROCYTE [DISTWIDTH] IN BLOOD BY AUTOMATED COUNT: 12.4 % (ref 11.5–14.5)
EST. AVERAGE GLUCOSE BLD GHB EST-MCNC: 174 MG/DL
GLUCOSE SERPL-MCNC: 176 MG/DL (ref 74–99)
GLUCOSE UR STRIP.AUTO-MCNC: ABNORMAL MG/DL
HBA1C MFR BLD: 7.7 %
HCT VFR BLD AUTO: 39.9 % (ref 36–46)
HCV AB SER QL: NONREACTIVE
HDLC SERPL-MCNC: 64.1 MG/DL
HGB BLD-MCNC: 13 G/DL (ref 12–16)
HOLD SPECIMEN: NORMAL
IMM GRANULOCYTES # BLD AUTO: 0.01 X10*3/UL (ref 0–0.5)
IMM GRANULOCYTES NFR BLD AUTO: 0.2 % (ref 0–0.9)
INR PPP: 0.9 (ref 0.9–1.1)
KETONES UR STRIP.AUTO-MCNC: NEGATIVE MG/DL
LDLC SERPL CALC-MCNC: 83 MG/DL
LEUKOCYTE ESTERASE UR QL STRIP.AUTO: NEGATIVE
LYMPHOCYTES # BLD AUTO: 2.41 X10*3/UL (ref 0.8–3)
LYMPHOCYTES NFR BLD AUTO: 37.4 %
MAGNESIUM SERPL-MCNC: 1.85 MG/DL (ref 1.6–2.4)
MCH RBC QN AUTO: 30.4 PG (ref 26–34)
MCHC RBC AUTO-ENTMCNC: 32.6 G/DL (ref 32–36)
MCV RBC AUTO: 93 FL (ref 80–100)
MICROALBUMIN UR-MCNC: <7 MG/L
MICROALBUMIN/CREAT UR: NORMAL MG/G{CREAT}
MONOCYTES # BLD AUTO: 0.45 X10*3/UL (ref 0.05–0.8)
MONOCYTES NFR BLD AUTO: 7 %
NEUTROPHILS # BLD AUTO: 3.11 X10*3/UL (ref 1.6–5.5)
NEUTROPHILS NFR BLD AUTO: 48.1 %
NITRITE UR QL STRIP.AUTO: NEGATIVE
NON HDL CHOLESTEROL: 105 MG/DL (ref 0–149)
NRBC BLD-RTO: 0 /100 WBCS (ref 0–0)
PH UR STRIP.AUTO: 5.5 [PH]
PLATELET # BLD AUTO: 245 X10*3/UL (ref 150–450)
POTASSIUM SERPL-SCNC: 4.7 MMOL/L (ref 3.5–5.3)
PROT SERPL-MCNC: 7.4 G/DL (ref 6.4–8.2)
PROT UR STRIP.AUTO-MCNC: NEGATIVE MG/DL
PROTHROMBIN TIME: 9.9 SECONDS (ref 9.8–12.4)
RBC # BLD AUTO: 4.27 X10*6/UL (ref 4–5.2)
RBC # UR STRIP.AUTO: NEGATIVE MG/DL
SODIUM SERPL-SCNC: 138 MMOL/L (ref 136–145)
SP GR UR STRIP.AUTO: 1.02
TRIGL SERPL-MCNC: 110 MG/DL (ref 0–149)
TSH SERPL-ACNC: 2.42 MIU/L (ref 0.44–3.98)
UROBILINOGEN UR STRIP.AUTO-MCNC: NORMAL MG/DL
VIT B12 SERPL-MCNC: 1072 PG/ML (ref 211–911)
VLDL: 22 MG/DL (ref 0–40)
WBC # BLD AUTO: 6.5 X10*3/UL (ref 4.4–11.3)

## 2025-04-03 PROCEDURE — 87081 CULTURE SCREEN ONLY: CPT | Mod: ELYLAB

## 2025-04-03 PROCEDURE — 82306 VITAMIN D 25 HYDROXY: CPT | Mod: ELYLAB

## 2025-04-03 PROCEDURE — 83036 HEMOGLOBIN GLYCOSYLATED A1C: CPT | Mod: ELYLAB

## 2025-04-03 PROCEDURE — 81003 URINALYSIS AUTO W/O SCOPE: CPT

## 2025-04-03 PROCEDURE — 82043 UR ALBUMIN QUANTITATIVE: CPT

## 2025-04-03 PROCEDURE — 84443 ASSAY THYROID STIM HORMONE: CPT

## 2025-04-03 PROCEDURE — 36415 COLL VENOUS BLD VENIPUNCTURE: CPT

## 2025-04-03 PROCEDURE — 82607 VITAMIN B-12: CPT | Mod: ELYLAB

## 2025-04-03 PROCEDURE — 83718 ASSAY OF LIPOPROTEIN: CPT

## 2025-04-03 PROCEDURE — 85610 PROTHROMBIN TIME: CPT

## 2025-04-03 PROCEDURE — 84075 ASSAY ALKALINE PHOSPHATASE: CPT

## 2025-04-03 PROCEDURE — 85025 COMPLETE CBC W/AUTO DIFF WBC: CPT

## 2025-04-03 PROCEDURE — 86803 HEPATITIS C AB TEST: CPT | Mod: ELYLAB

## 2025-04-03 PROCEDURE — 93005 ELECTROCARDIOGRAM TRACING: CPT

## 2025-04-03 PROCEDURE — 83735 ASSAY OF MAGNESIUM: CPT

## 2025-04-03 RX ORDER — DEXTROMETHORPHAN HYDROBROMIDE, GUAIFENESIN 5; 100 MG/5ML; MG/5ML
650 LIQUID ORAL NIGHTLY PRN
COMMUNITY

## 2025-04-03 NOTE — PREPROCEDURE INSTRUCTIONS
Shoulder  REPLACEMENT PRE-OPERATIVE INSTRUCTIONS     You will receive notification one business day prior to your surgery to confirm your arrival time and any additional information between 2 P.M. - 5 P.M. It is important that you answer your phone and/or check your messages during this time.     You may see in [employer] your surgery start time change several times even up to the day before your procedure. Please disregard those times and only follow the time given by the  who will be notifying you via phone and not a text.     Please arrive at your scheduled time to avoid delay or cancelled surgery.     Please enter the building through either the Main Entrance in front of the hospital or from the Parking Garage Walk Way Bridge. From the parking garage, which is free, take the 2nd Floor Walkway Bridge into the hospital and check in at the [facility] Outpatient Desk as you enter the hospital directly in front of you. If you enter through the Main Entrance take the elevator off the lobby on the right labeled “A” to the 2nd floor and check in at the [facility] Outpatient Surgery desk as you exit the elevator. Wheelchairs are available for use if using the Main Entrance.     Handicap parking in the land lot, in front of hospital by main entrance, wheelchairs are available at this entrance     ?   INSTRUCTIONS:   Please contact your doctor’s office, who is doing procedure, about any changes in your health, bad cold, fever, sore throat, or COVID within last 4 weeks     Talk to your surgeon for instructions if you should stop your aspirin, blood thinner, diabetes medicines, weight loss medications, multivitamins or over the counter supplements since many surgeons have you adjust or stop these medications prior to procedure. The doctor’s office may have you contact the prescribing doctor for medication adjustments for your surgery.     If you take certain medications like Beta Blocker or Anti-Seizure medication,  you may have to take them the morning of procedure with a sip of water. If this is the case your surgeons office should let you know, and the PAT nurses will follow up when they speak to you to verify you are aware.     If not staying overnight after your surgery, and you are receiving any type of anesthesia with your surgery, you must have an adult (age 18 or older) immediately available to drive you home after surgery or your procedure will be cancelled. You may be discharged home after surgery per an Uber, Lyft, Taxi or any other transportation service as long as the responsible adult (18 or older) is in the vehicle with you at time of discharge. The  of these transportation services is not responsible for your care and cannot be consider a responsible adult. We also highly recommend you have someone stay with you for the entire day and night of your surgery.     All jewelry and piercings must be removed. If you are unable to remove an item or have a dermal piercing, please be sure to tell the nurse when you arrive for surgery.     Nail polish must be removed off one finger of each hand     Make-up or other beauty products (lotion, deodorant, hairspray, perfume, etc.) must be removed or not used for day of surgery.     Avoid smoking, consuming alcohol, or any medical or recreational drug use for 24 hours before surgery.     Do not wear contacts to hospital, bring your glasses and a case     Leave valuables at home except photo ID, insurance card and any co-payment that has been requested by hospital.     For adults who are unable to consent or make medical decisions for themselves, a legal guardian or Power of  must accompany them to the surgery center. If this is not possible, please call [phone] to make additional arrangements.  If there is any guardianship or legal Power of  paperwork, please bring them the day of surgery.     Wear comfortable, loose fitting clothing into the  procedure.  While your admitted you are asked to bring short sleeve shirts, shorts (loose like pajamas, sweat shorts), and tennis shoes/sneakers.  No slip on shoes.     Please bring your sling Dr. Pavan Gilbert  [phone]  [facility] General Line: [phone] with you the day of surgery and the [facility] Booklet that was given to you during your PAT appointment.     The nurse practitioners, , , physical therapist, and occupational therapist will all discuss and work with you during your stay to help with discharge, physical therapy and any other needs. They also may discuss a program called Meds to Beds, where postoperative medications prescribed to you after surgery will be filled and delivered to your beside before you leave, so that you do not need to stop at the pharmacy on the way home    If you use a CPAP or BIPAP, please make sure the PAT nurse was able to get the settings from you, if not please inform the nurses the day of surgery of your settings you use at home.     Additional instructions about eating and drinking before surgery:     Do not eat any solid foods?or drink anything after midnight the night prior to your procedure. Milk, nutritional drinks/supplements, and infant formula are considered solid foods.  This also includes no gum, candy, lozenges, ice, or any other oral consumption.     CHG SOAP & ORAL RINSE   In regards to bathing, please follow the instructions explained to you at the PAT appointment about taking a showering with the CHG soap the 5 nights prior to your surgery.       During your PAT Appointment you were given [employer] CHG Wash Soap (bottles of bubble gum pink soap) to use before procedure.  Begin using the CHG soap 5 days before your scheduled surgery.  Be sure to sleep on clean sheets - change your sheets the first night you do this cleansing process (you don't not need to change them every night).     CHG SOAP  INSTRUCTIONS:  Begin using the CHG soap 5 days prior to your scheduled surgery.  You will wash and rinse your face and genitals with normal soap.  The night before surgery is the ONLY TIME you use the CHG SOAP for your hair, and do NOT use conditioner after washing with the CHG Soap.  For the rest of the showers the 5 days prior to surgery you will use normal shampoo.  Hair extensions should be removed.  Then apply CHG soap solution to a clean wet washcloth.     Turn the water off or move away from the water spray to avoid premature rinsing of the CHG soap as you apply it.   Avoid getting the CHG soap in your ears, eyes, and mouth.  Apply the CHG soap to entire body from the neck down EXCEPT your face and genitals.  Allow the CHG soap to sit for 3 minutes on your skin.  Then turn on water and rinse the CHG solution off your body completely.    DO NOT wash with regular soap after you have used the CHG soap   Pat yourself dry with a clean, fresh-laundered towel when you get out of the shower and clean Pj's  DO NOT apply any powders, deodorants, or lotions after shower   Be aware the CHG soap will stain fabrics if you wash them with bleach after use.   Make sure to pay special attention to cleaning area(s) where your incision(s) will be located. Avoid scrubbing your skin to hard.  You will repeat this same process steps 1-12 for each shower you take prior to surgery.  The morning of  the surgery is the fifth day.      ORAL RINSE   You will receive a call or notification from your pharmacy to  a prescription prior to procedure.  Be sure to  the prescription oral rinse from your local pharmacy.   You will be using the oral rinse the night before and the morning of surgery.    Take a cap full of the solution, 15mL   Swish (gargle if you can) the mouthwash in your mouth for at least 30 seconds, (DO NOT SWALLOW), and spit out   After the oral CHG rinse, do not rinse your mouth with water, drink, or eat.       If you have to take a medication the morning of surgery as instructed by your surgeon- please take that medication with a sip of water prior to doing the oral rinse the day of surgery.       ?If you have any questions or concerns, please call Pre-Admission Testing at [phone] or your Physician’s office

## 2025-04-04 ENCOUNTER — APPOINTMENT (OUTPATIENT)
Dept: PRIMARY CARE | Facility: CLINIC | Age: 75
End: 2025-04-04
Payer: COMMERCIAL

## 2025-04-04 VITALS
TEMPERATURE: 97.2 F | DIASTOLIC BLOOD PRESSURE: 60 MMHG | SYSTOLIC BLOOD PRESSURE: 120 MMHG | HEIGHT: 62 IN | OXYGEN SATURATION: 97 % | HEART RATE: 95 BPM | WEIGHT: 155.8 LBS | RESPIRATION RATE: 18 BRPM | BODY MASS INDEX: 28.67 KG/M2

## 2025-04-04 DIAGNOSIS — M19.011 PRIMARY OSTEOARTHRITIS, RIGHT SHOULDER: Primary | ICD-10-CM

## 2025-04-04 DIAGNOSIS — Z01.818 PREOPERATIVE CLEARANCE: ICD-10-CM

## 2025-04-04 DIAGNOSIS — N18.30 STAGE 3 CHRONIC KIDNEY DISEASE, UNSPECIFIED WHETHER STAGE 3A OR 3B CKD (MULTI): ICD-10-CM

## 2025-04-04 DIAGNOSIS — E11.9 CONTROLLED TYPE 2 DIABETES MELLITUS WITHOUT COMPLICATION, WITHOUT LONG-TERM CURRENT USE OF INSULIN: ICD-10-CM

## 2025-04-04 DIAGNOSIS — C34.11 MALIGNANT NEOPLASM OF UPPER LOBE OF RIGHT LUNG (MULTI): ICD-10-CM

## 2025-04-04 LAB
ATRIAL RATE: 77 BPM
P AXIS: 53 DEGREES
P OFFSET: 180 MS
P ONSET: 131 MS
PR INTERVAL: 162 MS
Q ONSET: 212 MS
QRS COUNT: 13 BEATS
QRS DURATION: 102 MS
QT INTERVAL: 376 MS
QTC CALCULATION(BAZETT): 425 MS
QTC FREDERICIA: 408 MS
R AXIS: -17 DEGREES
T AXIS: 49 DEGREES
T OFFSET: 400 MS
VENTRICULAR RATE: 77 BPM

## 2025-04-04 PROCEDURE — 3051F HG A1C>EQUAL 7.0%<8.0%: CPT | Performed by: INTERNAL MEDICINE

## 2025-04-04 PROCEDURE — 3062F POS MACROALBUMINURIA REV: CPT | Performed by: INTERNAL MEDICINE

## 2025-04-04 PROCEDURE — 99215 OFFICE O/P EST HI 40 MIN: CPT | Performed by: INTERNAL MEDICINE

## 2025-04-04 PROCEDURE — 1123F ACP DISCUSS/DSCN MKR DOCD: CPT | Performed by: INTERNAL MEDICINE

## 2025-04-04 PROCEDURE — 1036F TOBACCO NON-USER: CPT | Performed by: INTERNAL MEDICINE

## 2025-04-04 PROCEDURE — 3078F DIAST BP <80 MM HG: CPT | Performed by: INTERNAL MEDICINE

## 2025-04-04 PROCEDURE — 1159F MED LIST DOCD IN RCRD: CPT | Performed by: INTERNAL MEDICINE

## 2025-04-04 PROCEDURE — 4010F ACE/ARB THERAPY RXD/TAKEN: CPT | Performed by: INTERNAL MEDICINE

## 2025-04-04 PROCEDURE — 3048F LDL-C <100 MG/DL: CPT | Performed by: INTERNAL MEDICINE

## 2025-04-04 PROCEDURE — 1157F ADVNC CARE PLAN IN RCRD: CPT | Performed by: INTERNAL MEDICINE

## 2025-04-04 PROCEDURE — 3074F SYST BP LT 130 MM HG: CPT | Performed by: INTERNAL MEDICINE

## 2025-04-04 PROCEDURE — 1160F RVW MEDS BY RX/DR IN RCRD: CPT | Performed by: INTERNAL MEDICINE

## 2025-04-04 PROCEDURE — 3008F BODY MASS INDEX DOCD: CPT | Performed by: INTERNAL MEDICINE

## 2025-04-04 RX ORDER — METFORMIN HYDROCHLORIDE 750 MG/1
750 TABLET ORAL EVERY 12 HOURS
Qty: 180 TABLET | Refills: 1 | Status: SHIPPED | OUTPATIENT
Start: 2025-04-04 | End: 2026-04-04

## 2025-04-04 RX ORDER — ASPIRIN 81 MG/1
81 TABLET ORAL NIGHTLY
Qty: 30 TABLET | Refills: 0 | Status: SHIPPED | OUTPATIENT
Start: 2025-04-04

## 2025-04-04 RX ORDER — GLIPIZIDE 5 MG/1
5 TABLET ORAL
Qty: 180 TABLET | Refills: 1 | Status: SHIPPED | OUTPATIENT
Start: 2025-04-04 | End: 2026-04-04

## 2025-04-04 ASSESSMENT — ENCOUNTER SYMPTOMS
CHILLS: 0
FATIGUE: 0
NECK PAIN: 1
CHEST TIGHTNESS: 0
CONFUSION: 0
SORE THROAT: 0
VOMITING: 0
NAUSEA: 0
WHEEZING: 0
SHORTNESS OF BREATH: 0
ARTHRALGIAS: 1
CONSTIPATION: 0
DIARRHEA: 0
UNEXPECTED WEIGHT CHANGE: 0
WEAKNESS: 0
DIZZINESS: 0
ADENOPATHY: 0
JOINT SWELLING: 0
ABDOMINAL PAIN: 0
PALPITATIONS: 0
COUGH: 0
SLEEP DISTURBANCE: 1
DYSURIA: 0

## 2025-04-04 NOTE — PROGRESS NOTES
Subjective   Nan Crane is a 75 y.o. female who presents for Pre-op Clearance (Pre -op clearance  surgery ).  Pre op  clearance for surgery- Dr Gilbert- on 4.17.2025-R shoulder  surgery   Labs  and EKG done 4.3.2025  Labs and test reviewed with patient , all question answered, further evaluation, if needed , discussed.  Had an echo 2021 , normal, no chest pain or sob with exertion or taking stairs.    Her blood sugar goes down to 60 sometimes at night,2 to 3 x /week.      Review of Systems   Constitutional:  Negative for chills, fatigue and unexpected weight change.        Comment   HENT:  Negative for congestion, ear pain and sore throat.    Respiratory:  Negative for cough, chest tightness, shortness of breath and wheezing.    Cardiovascular:  Negative for palpitations and leg swelling.   Gastrointestinal:  Negative for abdominal pain, constipation, diarrhea, nausea and vomiting.   Genitourinary:  Negative for dysuria and urgency.   Musculoskeletal:  Positive for arthralgias and neck pain. Negative for joint swelling.   Skin:  Negative for rash.   Neurological:  Negative for dizziness and weakness.   Hematological:  Negative for adenopathy.   Psychiatric/Behavioral:  Positive for sleep disturbance. Negative for confusion.    All other systems reviewed and are negative.      Objective   Physical Exam  Constitutional:       Appearance: Normal appearance.   HENT:      Head: Normocephalic and atraumatic.   Eyes:      Conjunctiva/sclera: Conjunctivae normal.   Neck:      Vascular: No carotid bruit.   Cardiovascular:      Rate and Rhythm: Normal rate and regular rhythm.      Heart sounds: No murmur heard.  Pulmonary:      Effort: No respiratory distress.      Breath sounds: No wheezing, rhonchi or rales.   Chest:      Chest wall: No tenderness.   Abdominal:      General: Bowel sounds are normal. There is no distension.      Palpations: Abdomen is soft. There is no mass.      Tenderness: There is no abdominal  "tenderness.   Musculoskeletal:         General: Normal range of motion.      Cervical back: Neck supple.   Lymphadenopathy:      Cervical: No cervical adenopathy.   Skin:     Coloration: Skin is not jaundiced.      Findings: No rash.   Neurological:      General: No focal deficit present.      Mental Status: She is alert and oriented to person, place, and time. Mental status is at baseline.      Motor: No weakness.      Gait: Gait normal.   Psychiatric:         Mood and Affect: Mood normal.         Behavior: Behavior normal.         Judgment: Judgment normal.       /60 (BP Location: Left arm, Patient Position: Sitting)   Pulse 95   Temp 36.2 °C (97.2 °F)   Resp 18   Ht 1.575 m (5' 2\")   Wt 70.7 kg (155 lb 12.8 oz)   SpO2 97%   BMI 28.50 kg/m²   Lab Results   Component Value Date    WBC 6.5 04/03/2025    HGB 13.0 04/03/2025    HCT 39.9 04/03/2025    MCV 93 04/03/2025     04/03/2025     Lab Results   Component Value Date    GLUCOSE 176 (H) 04/03/2025    CALCIUM 10.6 (H) 04/03/2025     04/03/2025    K 4.7 04/03/2025    CO2 21 04/03/2025     04/03/2025    BUN 38 (H) 04/03/2025    CREATININE 1.31 (H) 04/03/2025     Lab Results   Component Value Date    ALT 12 04/03/2025    AST 20 04/03/2025    ALKPHOS 73 04/03/2025    BILITOT 0.6 04/03/2025     Lab Results   Component Value Date    TSH 2.42 04/03/2025     Lab Results   Component Value Date    CHOL 169 04/03/2025    CHOL 146 04/30/2024    CHOL 141 11/10/2022     Lab Results   Component Value Date    HDL 64.1 04/03/2025    HDL 59.7 04/30/2024    HDL 52.0 11/10/2022     Lab Results   Component Value Date    LDLCALC 83 04/03/2025    LDLCALC 64 04/30/2024     Lab Results   Component Value Date    TRIG 110 04/03/2025    TRIG 110 04/30/2024    TRIG 118 11/10/2022     No components found for: \"CHOLHDL\"  Lab Results   Component Value Date    NXPXWZHX06 1,072 (H) 04/03/2025     Lab Results   Component Value Date    HGBA1C 7.7 (H) 04/03/2025 "         Assessment/Plan   Problem List Items Addressed This Visit       CKD (chronic kidney disease) stage 3, GFR 30-59 ml/min (Multi)     Avoid taking nephrotoxic medication , NSAIDS like ibuprofen, naproxen,  increase po fluids intake, have your blood pressure well controlled, fu as advised.           Malignant neoplasm of upper lobe of right lung (Multi)     S/p surgery 2021, doing very well         Controlled type 2 diabetes mellitus without complication, without long-term current use of insulin     Will decrease glipizide to 2.5 mg in pm due to episodes of hypoglycemia at night.         Relevant Medications    metFORMIN 750 mg tablet    empagliflozin (Jardiance) 25 mg tablet    glipiZIDE (Glucotrol) 5 mg tablet    aspirin 81 mg EC tablet    Other Relevant Orders    Referral to Clinical Pharmacy    Preoperative clearance     Medically cleared for surgery   Harris risk is 0.19%         Primary osteoarthritis, right shoulder - Primary     Failed medical tx, to have surgery              Patient was identified as a fall risk. Risk prevention instructions provided.

## 2025-04-04 NOTE — PATIENT INSTRUCTIONS
Do not take glipizide the night before surgery,  Change glipizide to 1/2 pill ( 2.5 ) mg in pm and keep to 5 mg in aqm , after surgery.  Hold jardience 3 days prior to surgery  Hold asa 7 days prior to surgery.  Medically cleared for surgery  Surgical risk is       Ways to Help Prevent Falls at Home    Quick Tips   ? Ask for help if you need it. Most people want to help!   ? Get up slowly after sitting or laying down   ? Wear a medical alert device or keep cell phone in your pocket   ? Use night lights, especially areas near a bathroom   ? Keep the items you use often within reach on a small stool or end table   ? Use an assistive device such as walker or cane, as directed by provider/physical therapy   ? Use a non-slip mat and grab bars in your bathroom. Look for home health sections for best options     Other Areas to Focus On   ? Exercise and nutrition: Regular exercise or taking a falls prevention class are great ways improve strength and balance. Don’t forget to stay hydrated and bring a snack!   ? Medicine side effects: Some medicines can make you sleepy or dizzy, which could cause a fall. Ask your healthcare provider about the side effects your medicines could cause. Be sure to let them know if you take any vitamins or supplements as well.   ? Tripping hazards: Remove items you could trip on, such as loose mats, rugs, cords, and clutter. Wear closed toe shoes with rubber soles.   ? Health and wellness: Get regular checkups with your healthcare provider, plus routine vision and hearing screenings. Talk with your healthcare provider about:   o Your medicines and the possible side effects - bring them in a bag if that is easier!   o Problems with balance or feeling dizzy   o Ways to promote bone health, such as Vitamin D and calcium supplements   o Questions or concerns about falling     *Ask your healthcare team if you have questions     [facility], 2022

## 2025-04-05 DIAGNOSIS — Z01.818 PREOP EXAMINATION: Primary | ICD-10-CM

## 2025-04-05 LAB — STAPHYLOCOCCUS SPEC CULT: NORMAL

## 2025-04-05 RX ORDER — CHLORHEXIDINE GLUCONATE ORAL RINSE 1.2 MG/ML
15 SOLUTION DENTAL AS NEEDED
Qty: 120 ML | Refills: 0 | Status: SHIPPED | OUTPATIENT
Start: 2025-04-05 | End: 2025-04-19

## 2025-04-05 NOTE — PROGRESS NOTES
Peridex mouthwash sent to patient's pharmacy.  Patient to use as directed the night before and morning of upcoming orthopedic surgery.

## 2025-04-07 DIAGNOSIS — E11.9 CONTROLLED TYPE 2 DIABETES MELLITUS WITHOUT COMPLICATION, WITHOUT LONG-TERM CURRENT USE OF INSULIN: ICD-10-CM

## 2025-04-09 RX ORDER — METFORMIN HYDROCHLORIDE 750 MG/1
750 TABLET ORAL EVERY 12 HOURS
Qty: 180 TABLET | Refills: 1 | Status: SHIPPED | OUTPATIENT
Start: 2025-04-09 | End: 2025-04-10 | Stop reason: ALTCHOICE

## 2025-04-10 ENCOUNTER — TELEPHONE (OUTPATIENT)
Dept: PRIMARY CARE | Facility: CLINIC | Age: 75
End: 2025-04-10

## 2025-04-10 ENCOUNTER — OFFICE VISIT (OUTPATIENT)
Dept: ORTHOPEDIC SURGERY | Facility: CLINIC | Age: 75
End: 2025-04-10
Payer: COMMERCIAL

## 2025-04-10 ENCOUNTER — HOSPITAL ENCOUNTER (OUTPATIENT)
Dept: RADIOLOGY | Facility: CLINIC | Age: 75
Discharge: HOME | End: 2025-04-10
Payer: COMMERCIAL

## 2025-04-10 DIAGNOSIS — M19.019 GLENOHUMERAL ARTHRITIS: ICD-10-CM

## 2025-04-10 DIAGNOSIS — E11.9 CONTROLLED TYPE 2 DIABETES MELLITUS WITHOUT COMPLICATION, WITHOUT LONG-TERM CURRENT USE OF INSULIN: ICD-10-CM

## 2025-04-10 PROCEDURE — 99211 OFF/OP EST MAY X REQ PHY/QHP: CPT | Performed by: PHYSICIAN ASSISTANT

## 2025-04-10 PROCEDURE — L3670 SO ACRO/CLAV CAN WEB PRE OTS: HCPCS | Performed by: PHYSICIAN ASSISTANT

## 2025-04-10 PROCEDURE — 73030 X-RAY EXAM OF SHOULDER: CPT | Mod: RT

## 2025-04-10 RX ORDER — METFORMIN HYDROCHLORIDE 500 MG/1
1500 TABLET, EXTENDED RELEASE ORAL
Qty: 100 TABLET | Refills: 3 | Status: SHIPPED | OUTPATIENT
Start: 2025-04-10 | End: 2026-05-15

## 2025-04-13 NOTE — PROGRESS NOTES
History and Physical      CHIEF COMPLAINT:  Right shoulder pain    HISTORY OF PRESENT ILLNESS:      The patient is a75 y.o. female with significant past medical history of right shoulder pain due to rotator cuff tear and djd.  After exhausting conservative measures the patient elects to proceed with right reverse total shoulder replacement.       Past Medical History:  Past Medical History:   Diagnosis Date    Adjustment disorder with anxiety 04/24/2017    Acute adjustment disorder with anxiety    Adverse effect of anesthesia     constipation    Anxiety     Arthritis     CKD (chronic kidney disease)     following with PCP    Depression     Encounter for general adult medical examination without abnormal findings 10/09/2019    Annual physical exam    Fractures     nasal fracture    Gastroparesis     GERD (gastroesophageal reflux disease)     Glaucoma suspect     Gout, unspecified 07/17/2014    Acute gout    Hyperlipidemia, unspecified 08/22/2022    Hyperlipidemia    Hypertension     Hypokalemia 12/11/2019    Hypokalemia    Joint pain     Lung cancer (Multi)     right lung    Other conditions influencing health status     Nephrolithiasis Of The Left Kidney    Other conditions influencing health status     Screening for malignant neoplasms, colon    Personal history of diseases of the skin and subcutaneous tissue 07/16/2015    History of contact dermatitis    Personal history of diseases of the skin and subcutaneous tissue 05/16/2013    History of local infection of skin and subcutaneous tissue    Personal history of other diseases of the circulatory system     History of hypertension    Personal history of other diseases of the digestive system 02/24/2015    History of gastroenteritis    Personal history of other diseases of the digestive system     History of esophageal reflux    Personal history of other diseases of the musculoskeletal system and connective tissue 07/17/2014    History of low back pain     Personal history of other endocrine, nutritional and metabolic disease 01/14/2013    History of diabetes mellitus    Personal history of other infectious and parasitic diseases     History of herpes zoster    Plantar fascial fibromatosis 09/18/2016    Plantar fasciitis    Seasonal allergies     Type 2 diabetes mellitus with other specified complication 11/17/2022    Diabetes mellitus type 2 in obese    Type 2 diabetes mellitus with other specified complication 11/17/2022    Diabetes mellitus type 2 in obese    Type 2 diabetes mellitus with other specified complication 11/17/2022    Diabetes mellitus type 2 in obese    Type 2 diabetes mellitus with other specified complication 11/17/2022    Diabetes mellitus type 2 in obese    Type 2 diabetes mellitus with other specified complication 11/17/2022    Diabetes mellitus type 2 in obese    Type 2 diabetes mellitus with other specified complication 11/17/2022    Diabetes mellitus type 2 in obese    Type 2 diabetes mellitus with other specified complication 11/17/2022    Diabetes mellitus type 2 in obese    Type 2 diabetes mellitus with other specified complication 11/17/2022    Diabetes mellitus type 2 in obese    Type 2 diabetes mellitus with other specified complication 11/17/2022    Diabetes mellitus type 2 in obese    Type 2 diabetes mellitus with other specified complication 11/17/2022    Diabetes mellitus type 2 in obese    Type 2 diabetes mellitus with other specified complication 11/17/2022    Diabetes mellitus type 2 in obese    Type 2 diabetes mellitus with other specified complication 11/17/2022    Diabetes mellitus type 2 in obese    Type 2 diabetes mellitus with other specified complication 11/17/2022    Diabetes mellitus type 2 in obese    Type 2 diabetes mellitus with other specified complication 11/17/2022    Diabetes mellitus type 2 in obese    Type 2 diabetes mellitus with other specified complication 11/17/2022    Diabetes mellitus type 2 in obese     Type 2 diabetes mellitus with other specified complication 2022    Diabetes mellitus type 2 in obese    Type 2 diabetes mellitus with other specified complication 2022    Diabetes mellitus type 2 in obese    Type 2 diabetes mellitus with other specified complication 2022    Diabetes mellitus type 2 in obese    Vertigo         Past Surgical History:    Past Surgical History:   Procedure Laterality Date    CATARACT EXTRACTION Bilateral      SECTION, LOW TRANSVERSE      COLONOSCOPY      DILATION AND CURETTAGE OF UTERUS      JOINT REPLACEMENT      KNEE ARTHROPLASTY Bilateral     LASIK Bilateral     MV    LUNG REMOVAL, PARTIAL Right     OTHER SURGICAL HISTORY  2016    Adult Caregiver Appeared To Understand Counseling    TOTAL SHOULDER ARTHROPLASTY Left     UPPER GASTROINTESTINAL ENDOSCOPY         Medications Prior to Admission:    Current Outpatient Medications on File Prior to Visit   Medication Sig Dispense Refill    acetaminophen (Tylenol 8 HOUR) 650 mg ER tablet Take 1 tablet (650 mg) by mouth as needed at bedtime for mild pain (1 - 3). Do not crush, chew, or split.      aspirin 81 mg EC tablet Take 1 tablet (81 mg) by mouth once daily at bedtime. 30 tablet 0    blood sugar diagnostic (Accu-Chek Guide test strips) strip 1 strip 1 (one) time each day.      blood-glucose meter misc Check your blood sugar 3 to 5 times daily. 1 each 0    chlorhexidine (Peridex) 0.12 % solution Use 15 mL in the mouth or throat if needed for wound care for up to 14 days. Use the night before and morning of upcoming orthopedic surgery. 120 mL 0    chlorthalidone (Hygroton) 25 mg tablet TAKE 1 TABLET DAILY 90 tablet 3    cholecalciferol (Vitamin D-3) 50 mcg (2,000 unit) capsule Take by mouth early in the morning..      coenzyme Q-10 200 mg capsule 1 capsule (200 mg) once daily.      cyanocobalamin, vitamin B-12, (Vitamin B-12) 1,000 mcg tablet extended release Take 1 tablet (1,000 mcg) by mouth once daily.       empagliflozin (Jardiance) 25 mg tablet Take 1 tablet (25 mg) by mouth once daily. 90 tablet 1    fluticasone (Flonase) 50 mcg/actuation nasal spray ADMINISTER 1 SPRAY INTO EACH NOSTRIL IF NEEDED (AS NEEDED FOR ALLERGIES). 32 mL 1    FreeStyle Jessica 2 [place] misc Use as instructed 1 each 0    FreeStyle Jessica 2 Sensor kit Use as instructed; change sensor every 14 days 6 each 1    glipiZIDE (Glucotrol) 5 mg tablet Take 1 tablet (5 mg) by mouth 2 times a day before meals. Take 1 pill in am (5 mg) and 1/2 pill (2.5 mg ) in pm 180 tablet 1    lancets misc Accu-Chek FastClix Lancets; Use one daily      loratadine (CLARITIN ORAL) Take 1 tablet by mouth once daily as needed (allergies).      losartan (Cozaar) 50 mg tablet Take 1 tablet (50 mg) by mouth once daily. 90 tablet 3    omeprazole (PriLOSEC) 20 mg DR capsule TAKE 1 CAPSULE DAILY 90 capsule 3    polyethylene glycol (Glycolax, Miralax) 17 gram packet Take 17 g by mouth once daily as needed.      rosuvastatin (Crestor) 10 mg tablet TAKE 1 TABLET ONCE DAILY ATBEDTIME 90 tablet 3    spironolactone (Aldactone) 25 mg tablet TAKE 1 TABLET ONCE DAILY 90 tablet 2    [DISCONTINUED] metFORMIN 750 mg tablet Take 750 mg by mouth every 12 hours. 180 tablet 1    [DISCONTINUED] metFORMIN 750 mg tablet TAKE 750 MG BY MOUTH EVERY 12 HOURS. 180 tablet 1     No current facility-administered medications on file prior to visit.        Allergies:  Albuterol, Azatadine, Buspirone, Codeine, Other, Quinapril, Pollen extracts, Trinalin, Amoxicillin, Azatadine-pseudoephedrine, Doxycycline, Doxycycline calcium, Erythromycin, Sulfa (sulfonamide antibiotics), and Sulfamethoxazole-trimethoprim    Social History:   Social History     Socioeconomic History    Marital status:      Spouse name: Not on file    Number of children: Not on file    Years of education: Not on file    Highest education level: Not on file   Occupational History    Not on file   Tobacco Use    Smoking status: Never     Smokeless tobacco: Never   Vaping Use    Vaping status: Never Used   Substance and Sexual Activity    Alcohol use: Never    Drug use: Never    Sexual activity: Not on file   Other Topics Concern    Not on file   Social History Narrative    Not on file     Social Drivers of Health     Financial Resource Strain: Not on file   Food Insecurity: No Food Insecurity (10/16/2024)    Hunger Vital Sign     Worried About Running Out of Food in the Last Year: Never true     Ran Out of Food in the Last Year: Never true   Transportation Needs: Not on file   Physical Activity: Not on file   Stress: Not on file   Social Connections: Not on file   Intimate Partner Violence: Not on file   Housing Stability: Not on file       Family History:   Family History   Problem Relation Name Age of Onset    Other (Malignant Neoplasm Of Ovary) Mother      Diabetes Father      Other (Malignat Neoplasm) Father      Other (Gram Positive Sepsis) Brother      Diabetes Other Grandfather     Glaucoma Neg Hx      Macular degeneration Neg Hx          Review of systems: Noncontributory for orthopedics    Vitals:  There were no vitals taken for this visit.    Physical examination:  Head normocephalic atraumatic  Heart regular rate and rhythm  Lungs clear  Abdominal exam nontender nondistended  Extremity: Right shoulder flexion to 120, abd to 60, ext rot to 45    Impression : Right shoulder rotator cuff tear and djd      Plan:  Scheduled for right reverse total shoulder     Risk and benefits of surgery discussed extensively with the patient.    Surgical risk included but were not limited to infection, wear, loosening, need for further surgery blood clot, failure to heal, failure of the surgery, stiffness, loss of limb life, extremity function change in length change, and associated risks of surgery during the coronavirus epidemic.    Risk with any surgery including arthroplasty and replacements include but are not limited to:       Wear, loosening,  infection, blood clot, DVT, loss of limb, life, delayed recovery, limb length change, instability, dislocation, discomfort with new implant and failure of the procedure.

## 2025-04-13 NOTE — H&P (VIEW-ONLY)
History and Physical      CHIEF COMPLAINT:  Right shoulder pain    HISTORY OF PRESENT ILLNESS:      The patient is a75 y.o. female with significant past medical history of right shoulder pain due to rotator cuff tear and djd.  After exhausting conservative measures the patient elects to proceed with right reverse total shoulder replacement.       Past Medical History:  Past Medical History:   Diagnosis Date    Adjustment disorder with anxiety 04/24/2017    Acute adjustment disorder with anxiety    Adverse effect of anesthesia     constipation    Anxiety     Arthritis     CKD (chronic kidney disease)     following with PCP    Depression     Encounter for general adult medical examination without abnormal findings 10/09/2019    Annual physical exam    Fractures     nasal fracture    Gastroparesis     GERD (gastroesophageal reflux disease)     Glaucoma suspect     Gout, unspecified 07/17/2014    Acute gout    Hyperlipidemia, unspecified 08/22/2022    Hyperlipidemia    Hypertension     Hypokalemia 12/11/2019    Hypokalemia    Joint pain     Lung cancer (Multi)     right lung    Other conditions influencing health status     Nephrolithiasis Of The Left Kidney    Other conditions influencing health status     Screening for malignant neoplasms, colon    Personal history of diseases of the skin and subcutaneous tissue 07/16/2015    History of contact dermatitis    Personal history of diseases of the skin and subcutaneous tissue 05/16/2013    History of local infection of skin and subcutaneous tissue    Personal history of other diseases of the circulatory system     History of hypertension    Personal history of other diseases of the digestive system 02/24/2015    History of gastroenteritis    Personal history of other diseases of the digestive system     History of esophageal reflux    Personal history of other diseases of the musculoskeletal system and connective tissue 07/17/2014    History of low back pain     Personal history of other endocrine, nutritional and metabolic disease 01/14/2013    History of diabetes mellitus    Personal history of other infectious and parasitic diseases     History of herpes zoster    Plantar fascial fibromatosis 09/18/2016    Plantar fasciitis    Seasonal allergies     Type 2 diabetes mellitus with other specified complication 11/17/2022    Diabetes mellitus type 2 in obese    Type 2 diabetes mellitus with other specified complication 11/17/2022    Diabetes mellitus type 2 in obese    Type 2 diabetes mellitus with other specified complication 11/17/2022    Diabetes mellitus type 2 in obese    Type 2 diabetes mellitus with other specified complication 11/17/2022    Diabetes mellitus type 2 in obese    Type 2 diabetes mellitus with other specified complication 11/17/2022    Diabetes mellitus type 2 in obese    Type 2 diabetes mellitus with other specified complication 11/17/2022    Diabetes mellitus type 2 in obese    Type 2 diabetes mellitus with other specified complication 11/17/2022    Diabetes mellitus type 2 in obese    Type 2 diabetes mellitus with other specified complication 11/17/2022    Diabetes mellitus type 2 in obese    Type 2 diabetes mellitus with other specified complication 11/17/2022    Diabetes mellitus type 2 in obese    Type 2 diabetes mellitus with other specified complication 11/17/2022    Diabetes mellitus type 2 in obese    Type 2 diabetes mellitus with other specified complication 11/17/2022    Diabetes mellitus type 2 in obese    Type 2 diabetes mellitus with other specified complication 11/17/2022    Diabetes mellitus type 2 in obese    Type 2 diabetes mellitus with other specified complication 11/17/2022    Diabetes mellitus type 2 in obese    Type 2 diabetes mellitus with other specified complication 11/17/2022    Diabetes mellitus type 2 in obese    Type 2 diabetes mellitus with other specified complication 11/17/2022    Diabetes mellitus type 2 in obese     Type 2 diabetes mellitus with other specified complication 2022    Diabetes mellitus type 2 in obese    Type 2 diabetes mellitus with other specified complication 2022    Diabetes mellitus type 2 in obese    Type 2 diabetes mellitus with other specified complication 2022    Diabetes mellitus type 2 in obese    Vertigo         Past Surgical History:    Past Surgical History:   Procedure Laterality Date    CATARACT EXTRACTION Bilateral      SECTION, LOW TRANSVERSE      COLONOSCOPY      DILATION AND CURETTAGE OF UTERUS      JOINT REPLACEMENT      KNEE ARTHROPLASTY Bilateral     LASIK Bilateral     MV    LUNG REMOVAL, PARTIAL Right     OTHER SURGICAL HISTORY  2016    Adult Caregiver Appeared To Understand Counseling    TOTAL SHOULDER ARTHROPLASTY Left     UPPER GASTROINTESTINAL ENDOSCOPY         Medications Prior to Admission:    Current Outpatient Medications on File Prior to Visit   Medication Sig Dispense Refill    acetaminophen (Tylenol 8 HOUR) 650 mg ER tablet Take 1 tablet (650 mg) by mouth as needed at bedtime for mild pain (1 - 3). Do not crush, chew, or split.      aspirin 81 mg EC tablet Take 1 tablet (81 mg) by mouth once daily at bedtime. 30 tablet 0    blood sugar diagnostic (Accu-Chek Guide test strips) strip 1 strip 1 (one) time each day.      blood-glucose meter misc Check your blood sugar 3 to 5 times daily. 1 each 0    chlorhexidine (Peridex) 0.12 % solution Use 15 mL in the mouth or throat if needed for wound care for up to 14 days. Use the night before and morning of upcoming orthopedic surgery. 120 mL 0    chlorthalidone (Hygroton) 25 mg tablet TAKE 1 TABLET DAILY 90 tablet 3    cholecalciferol (Vitamin D-3) 50 mcg (2,000 unit) capsule Take by mouth early in the morning..      coenzyme Q-10 200 mg capsule 1 capsule (200 mg) once daily.      cyanocobalamin, vitamin B-12, (Vitamin B-12) 1,000 mcg tablet extended release Take 1 tablet (1,000 mcg) by mouth once daily.       empagliflozin (Jardiance) 25 mg tablet Take 1 tablet (25 mg) by mouth once daily. 90 tablet 1    fluticasone (Flonase) 50 mcg/actuation nasal spray ADMINISTER 1 SPRAY INTO EACH NOSTRIL IF NEEDED (AS NEEDED FOR ALLERGIES). 32 mL 1    FreeStyle Jessica 2 Sylvester misc Use as instructed 1 each 0    FreeStyle Jessica 2 Sensor kit Use as instructed; change sensor every 14 days 6 each 1    glipiZIDE (Glucotrol) 5 mg tablet Take 1 tablet (5 mg) by mouth 2 times a day before meals. Take 1 pill in am (5 mg) and 1/2 pill (2.5 mg ) in pm 180 tablet 1    lancets misc Accu-Chek FastClix Lancets; Use one daily      loratadine (CLARITIN ORAL) Take 1 tablet by mouth once daily as needed (allergies).      losartan (Cozaar) 50 mg tablet Take 1 tablet (50 mg) by mouth once daily. 90 tablet 3    omeprazole (PriLOSEC) 20 mg DR capsule TAKE 1 CAPSULE DAILY 90 capsule 3    polyethylene glycol (Glycolax, Miralax) 17 gram packet Take 17 g by mouth once daily as needed.      rosuvastatin (Crestor) 10 mg tablet TAKE 1 TABLET ONCE DAILY ATBEDTIME 90 tablet 3    spironolactone (Aldactone) 25 mg tablet TAKE 1 TABLET ONCE DAILY 90 tablet 2    [DISCONTINUED] metFORMIN 750 mg tablet Take 750 mg by mouth every 12 hours. 180 tablet 1    [DISCONTINUED] metFORMIN 750 mg tablet TAKE 750 MG BY MOUTH EVERY 12 HOURS. 180 tablet 1     No current facility-administered medications on file prior to visit.        Allergies:  Albuterol, Azatadine, Buspirone, Codeine, Other, Quinapril, Pollen extracts, Trinalin, Amoxicillin, Azatadine-pseudoephedrine, Doxycycline, Doxycycline calcium, Erythromycin, Sulfa (sulfonamide antibiotics), and Sulfamethoxazole-trimethoprim    Social History:   Social History     Socioeconomic History    Marital status:      Spouse name: Not on file    Number of children: Not on file    Years of education: Not on file    Highest education level: Not on file   Occupational History    Not on file   Tobacco Use    Smoking status: Never     Smokeless tobacco: Never   Vaping Use    Vaping status: Never Used   Substance and Sexual Activity    Alcohol use: Never    Drug use: Never    Sexual activity: Not on file   Other Topics Concern    Not on file   Social History Narrative    Not on file     Social Drivers of Health     Financial Resource Strain: Not on file   Food Insecurity: No Food Insecurity (10/16/2024)    Hunger Vital Sign     Worried About Running Out of Food in the Last Year: Never true     Ran Out of Food in the Last Year: Never true   Transportation Needs: Not on file   Physical Activity: Not on file   Stress: Not on file   Social Connections: Not on file   Intimate Partner Violence: Not on file   Housing Stability: Not on file       Family History:   Family History   Problem Relation Name Age of Onset    Other (Malignant Neoplasm Of Ovary) Mother      Diabetes Father      Other (Malignat Neoplasm) Father      Other (Gram Positive Sepsis) Brother      Diabetes Other Grandfather     Glaucoma Neg Hx      Macular degeneration Neg Hx          Review of systems: Noncontributory for orthopedics    Vitals:  There were no vitals taken for this visit.    Physical examination:  Head normocephalic atraumatic  Heart regular rate and rhythm  Lungs clear  Abdominal exam nontender nondistended  Extremity: Right shoulder flexion to 120, abd to 60, ext rot to 45    Impression : Right shoulder rotator cuff tear and djd      Plan:  Scheduled for right reverse total shoulder     Risk and benefits of surgery discussed extensively with the patient.    Surgical risk included but were not limited to infection, wear, loosening, need for further surgery blood clot, failure to heal, failure of the surgery, stiffness, loss of limb life, extremity function change in length change, and associated risks of surgery during the coronavirus epidemic.    Risk with any surgery including arthroplasty and replacements include but are not limited to:       Wear, loosening,  infection, blood clot, DVT, loss of limb, life, delayed recovery, limb length change, instability, dislocation, discomfort with new implant and failure of the procedure.

## 2025-04-14 PROCEDURE — RXMED WILLOW AMBULATORY MEDICATION CHARGE

## 2025-04-15 ENCOUNTER — APPOINTMENT (OUTPATIENT)
Dept: PHARMACY | Facility: HOSPITAL | Age: 75
End: 2025-04-15
Payer: COMMERCIAL

## 2025-04-17 ENCOUNTER — ANESTHESIA (OUTPATIENT)
Dept: OPERATING ROOM | Facility: HOSPITAL | Age: 75
End: 2025-04-17
Payer: COMMERCIAL

## 2025-04-17 ENCOUNTER — APPOINTMENT (OUTPATIENT)
Dept: RADIOLOGY | Facility: HOSPITAL | Age: 75
End: 2025-04-17
Payer: COMMERCIAL

## 2025-04-17 ENCOUNTER — ANESTHESIA EVENT (OUTPATIENT)
Dept: OPERATING ROOM | Facility: HOSPITAL | Age: 75
End: 2025-04-17
Payer: COMMERCIAL

## 2025-04-17 ENCOUNTER — HOSPITAL ENCOUNTER (OUTPATIENT)
Facility: HOSPITAL | Age: 75
Discharge: HOME | End: 2025-04-18
Attending: ORTHOPAEDIC SURGERY | Admitting: ORTHOPAEDIC SURGERY
Payer: COMMERCIAL

## 2025-04-17 DIAGNOSIS — M19.011 PRIMARY OSTEOARTHRITIS, RIGHT SHOULDER: Primary | ICD-10-CM

## 2025-04-17 DIAGNOSIS — M75.121 COMPLETE TEAR OF RIGHT ROTATOR CUFF, UNSPECIFIED WHETHER TRAUMATIC: ICD-10-CM

## 2025-04-17 DIAGNOSIS — M19.011 OSTEOARTHRITIS OF RIGHT SHOULDER, UNSPECIFIED OSTEOARTHRITIS TYPE: ICD-10-CM

## 2025-04-17 LAB
GLUCOSE BLD MANUAL STRIP-MCNC: 125 MG/DL (ref 74–99)
GLUCOSE BLD MANUAL STRIP-MCNC: 160 MG/DL (ref 74–99)
GLUCOSE BLD MANUAL STRIP-MCNC: 187 MG/DL (ref 74–99)
GLUCOSE BLD MANUAL STRIP-MCNC: 414 MG/DL (ref 74–99)

## 2025-04-17 PROCEDURE — 3600000010 HC OR TIME - EACH INCREMENTAL 1 MINUTE - PROCEDURE LEVEL FIVE: Performed by: ORTHOPAEDIC SURGERY

## 2025-04-17 PROCEDURE — 3600000005 HC OR TIME - INITIAL BASE CHARGE - PROCEDURE LEVEL FIVE: Performed by: ORTHOPAEDIC SURGERY

## 2025-04-17 PROCEDURE — 82947 ASSAY GLUCOSE BLOOD QUANT: CPT

## 2025-04-17 PROCEDURE — 7100000002 HC RECOVERY ROOM TIME - EACH INCREMENTAL 1 MINUTE: Performed by: ORTHOPAEDIC SURGERY

## 2025-04-17 PROCEDURE — 2500000004 HC RX 250 GENERAL PHARMACY W/ HCPCS (ALT 636 FOR OP/ED): Mod: JZ | Performed by: ORTHOPAEDIC SURGERY

## 2025-04-17 PROCEDURE — 2500000004 HC RX 250 GENERAL PHARMACY W/ HCPCS (ALT 636 FOR OP/ED): Performed by: STUDENT IN AN ORGANIZED HEALTH CARE EDUCATION/TRAINING PROGRAM

## 2025-04-17 PROCEDURE — 2500000004 HC RX 250 GENERAL PHARMACY W/ HCPCS (ALT 636 FOR OP/ED): Performed by: ORTHOPAEDIC SURGERY

## 2025-04-17 PROCEDURE — 7100000001 HC RECOVERY ROOM TIME - INITIAL BASE CHARGE: Performed by: ORTHOPAEDIC SURGERY

## 2025-04-17 PROCEDURE — 2500000004 HC RX 250 GENERAL PHARMACY W/ HCPCS (ALT 636 FOR OP/ED): Mod: JZ | Performed by: NURSE ANESTHETIST, CERTIFIED REGISTERED

## 2025-04-17 PROCEDURE — 3700000002 HC GENERAL ANESTHESIA TIME - EACH INCREMENTAL 1 MINUTE: Performed by: ORTHOPAEDIC SURGERY

## 2025-04-17 PROCEDURE — 2500000002 HC RX 250 W HCPCS SELF ADMINISTERED DRUGS (ALT 637 FOR MEDICARE OP, ALT 636 FOR OP/ED): Performed by: ORTHOPAEDIC SURGERY

## 2025-04-17 PROCEDURE — C1776 JOINT DEVICE (IMPLANTABLE): HCPCS | Performed by: ORTHOPAEDIC SURGERY

## 2025-04-17 PROCEDURE — 2780000003 HC OR 278 NO HCPCS: Performed by: ORTHOPAEDIC SURGERY

## 2025-04-17 PROCEDURE — 2500000004 HC RX 250 GENERAL PHARMACY W/ HCPCS (ALT 636 FOR OP/ED): Mod: JZ | Performed by: STUDENT IN AN ORGANIZED HEALTH CARE EDUCATION/TRAINING PROGRAM

## 2025-04-17 PROCEDURE — 7100000011 HC EXTENDED STAY RECOVERY HOURLY - NURSING UNIT

## 2025-04-17 PROCEDURE — 23472 RECONSTRUCT SHOULDER JOINT: CPT | Performed by: PHYSICIAN ASSISTANT

## 2025-04-17 PROCEDURE — 23472 RECONSTRUCT SHOULDER JOINT: CPT | Performed by: ORTHOPAEDIC SURGERY

## 2025-04-17 PROCEDURE — 73020 X-RAY EXAM OF SHOULDER: CPT | Mod: RT

## 2025-04-17 PROCEDURE — 3700000001 HC GENERAL ANESTHESIA TIME - INITIAL BASE CHARGE: Performed by: ORTHOPAEDIC SURGERY

## 2025-04-17 PROCEDURE — 2720000007 HC OR 272 NO HCPCS: Performed by: ORTHOPAEDIC SURGERY

## 2025-04-17 PROCEDURE — C1713 ANCHOR/SCREW BN/BN,TIS/BN: HCPCS | Performed by: ORTHOPAEDIC SURGERY

## 2025-04-17 PROCEDURE — 2500000001 HC RX 250 WO HCPCS SELF ADMINISTERED DRUGS (ALT 637 FOR MEDICARE OP): Performed by: ORTHOPAEDIC SURGERY

## 2025-04-17 PROCEDURE — 2500000005 HC RX 250 GENERAL PHARMACY W/O HCPCS: Performed by: ORTHOPAEDIC SURGERY

## 2025-04-17 DEVICE — LOCKING CAP, NCB: Type: IMPLANTABLE DEVICE | Site: SHOULDER | Status: FUNCTIONAL

## 2025-04-17 DEVICE — IMPLANTABLE DEVICE: Type: IMPLANTABLE DEVICE | Site: SHOULDER | Status: FUNCTIONAL

## 2025-04-17 DEVICE — IMPLANTABLE DEVICE
Type: IMPLANTABLE DEVICE | Site: SHOULDER | Status: NON-FUNCTIONAL
Brand: STEINMANN PIN

## 2025-04-17 DEVICE — BASE PLATE, MMTM RVS, 26MM, 15MM: Type: IMPLANTABLE DEVICE | Site: SHOULDER | Status: FUNCTIONAL

## 2025-04-17 DEVICE — HUMERAL STEM, 14MM DIA X 130MM LGTH, REVERSE SHOULDER SYSTEM: Type: IMPLANTABLE DEVICE | Site: SHOULDER | Status: FUNCTIONAL

## 2025-04-17 DEVICE — IMPLANTABLE DEVICE
Type: IMPLANTABLE DEVICE | Site: SHOULDER | Status: NON-FUNCTIONAL
Brand: TRABECULAR METAL®

## 2025-04-17 DEVICE — SCREW, NCB, CANCELLOUS, SELF TAP, 4.5 X 38MM: Type: IMPLANTABLE DEVICE | Site: SHOULDER | Status: FUNCTIONAL

## 2025-04-17 RX ORDER — ACETAMINOPHEN 325 MG/1
650 TABLET ORAL EVERY 6 HOURS SCHEDULED
Status: DISCONTINUED | OUTPATIENT
Start: 2025-04-17 | End: 2025-04-18 | Stop reason: HOSPADM

## 2025-04-17 RX ORDER — LABETALOL HYDROCHLORIDE 5 MG/ML
5 INJECTION, SOLUTION INTRAVENOUS ONCE AS NEEDED
Status: DISCONTINUED | OUTPATIENT
Start: 2025-04-17 | End: 2025-04-17 | Stop reason: HOSPADM

## 2025-04-17 RX ORDER — CYCLOBENZAPRINE HCL 10 MG
10 TABLET ORAL 3 TIMES DAILY PRN
Status: DISCONTINUED | OUTPATIENT
Start: 2025-04-17 | End: 2025-04-18 | Stop reason: HOSPADM

## 2025-04-17 RX ORDER — TRANEXAMIC ACID 650 MG/1
1950 TABLET ORAL ONCE
Status: COMPLETED | OUTPATIENT
Start: 2025-04-17 | End: 2025-04-17

## 2025-04-17 RX ORDER — OXYCODONE HYDROCHLORIDE 5 MG/1
10 TABLET ORAL EVERY 6 HOURS PRN
Status: DISCONTINUED | OUTPATIENT
Start: 2025-04-17 | End: 2025-04-18 | Stop reason: HOSPADM

## 2025-04-17 RX ORDER — FENTANYL CITRATE 50 UG/ML
50 INJECTION, SOLUTION INTRAMUSCULAR; INTRAVENOUS EVERY 5 MIN PRN
Status: DISCONTINUED | OUTPATIENT
Start: 2025-04-17 | End: 2025-04-17 | Stop reason: HOSPADM

## 2025-04-17 RX ORDER — PROCHLORPERAZINE MALEATE 5 MG
10 TABLET ORAL EVERY 6 HOURS PRN
Status: DISCONTINUED | OUTPATIENT
Start: 2025-04-17 | End: 2025-04-18 | Stop reason: HOSPADM

## 2025-04-17 RX ORDER — MEPERIDINE HYDROCHLORIDE 25 MG/ML
12.5 INJECTION INTRAMUSCULAR; INTRAVENOUS; SUBCUTANEOUS EVERY 10 MIN PRN
Status: DISCONTINUED | OUTPATIENT
Start: 2025-04-17 | End: 2025-04-17 | Stop reason: HOSPADM

## 2025-04-17 RX ORDER — ROCURONIUM BROMIDE 10 MG/ML
INJECTION, SOLUTION INTRAVENOUS AS NEEDED
Status: DISCONTINUED | OUTPATIENT
Start: 2025-04-17 | End: 2025-04-17

## 2025-04-17 RX ORDER — PROCHLORPERAZINE EDISYLATE 5 MG/ML
10 INJECTION INTRAMUSCULAR; INTRAVENOUS EVERY 6 HOURS PRN
Status: DISCONTINUED | OUTPATIENT
Start: 2025-04-17 | End: 2025-04-18 | Stop reason: HOSPADM

## 2025-04-17 RX ORDER — SODIUM CHLORIDE 0.9 G/100ML
INJECTION, SOLUTION IRRIGATION AS NEEDED
Status: DISCONTINUED | OUTPATIENT
Start: 2025-04-17 | End: 2025-04-17 | Stop reason: HOSPADM

## 2025-04-17 RX ORDER — NALOXONE HYDROCHLORIDE 1 MG/ML
0.2 INJECTION INTRAMUSCULAR; INTRAVENOUS; SUBCUTANEOUS EVERY 5 MIN PRN
Status: DISCONTINUED | OUTPATIENT
Start: 2025-04-17 | End: 2025-04-18 | Stop reason: HOSPADM

## 2025-04-17 RX ORDER — CELECOXIB 200 MG/1
200 CAPSULE ORAL ONCE
Status: COMPLETED | OUTPATIENT
Start: 2025-04-17 | End: 2025-04-17

## 2025-04-17 RX ORDER — PROPOFOL 10 MG/ML
INJECTION, EMULSION INTRAVENOUS AS NEEDED
Status: DISCONTINUED | OUTPATIENT
Start: 2025-04-17 | End: 2025-04-17

## 2025-04-17 RX ORDER — DIPHENHYDRAMINE HYDROCHLORIDE 50 MG/ML
12.5 INJECTION, SOLUTION INTRAMUSCULAR; INTRAVENOUS ONCE AS NEEDED
Status: DISCONTINUED | OUTPATIENT
Start: 2025-04-17 | End: 2025-04-17 | Stop reason: HOSPADM

## 2025-04-17 RX ORDER — OXYCODONE HYDROCHLORIDE 5 MG/1
5 TABLET ORAL EVERY 4 HOURS PRN
Status: DISCONTINUED | OUTPATIENT
Start: 2025-04-17 | End: 2025-04-18 | Stop reason: HOSPADM

## 2025-04-17 RX ORDER — ONDANSETRON 4 MG/1
4 TABLET, FILM COATED ORAL EVERY 8 HOURS PRN
Status: DISCONTINUED | OUTPATIENT
Start: 2025-04-17 | End: 2025-04-18 | Stop reason: HOSPADM

## 2025-04-17 RX ORDER — MIDAZOLAM HYDROCHLORIDE 1 MG/ML
INJECTION, SOLUTION INTRAMUSCULAR; INTRAVENOUS AS NEEDED
Status: DISCONTINUED | OUTPATIENT
Start: 2025-04-17 | End: 2025-04-17

## 2025-04-17 RX ORDER — SODIUM CHLORIDE, SODIUM LACTATE, POTASSIUM CHLORIDE, CALCIUM CHLORIDE 600; 310; 30; 20 MG/100ML; MG/100ML; MG/100ML; MG/100ML
50 INJECTION, SOLUTION INTRAVENOUS CONTINUOUS
Status: DISCONTINUED | OUTPATIENT
Start: 2025-04-17 | End: 2025-04-18 | Stop reason: HOSPADM

## 2025-04-17 RX ORDER — LIDOCAINE HYDROCHLORIDE 20 MG/ML
INJECTION, SOLUTION INFILTRATION; PERINEURAL AS NEEDED
Status: DISCONTINUED | OUTPATIENT
Start: 2025-04-17 | End: 2025-04-17

## 2025-04-17 RX ORDER — CEFAZOLIN SODIUM 2 G/100ML
2 INJECTION, SOLUTION INTRAVENOUS ONCE
Status: COMPLETED | OUTPATIENT
Start: 2025-04-17 | End: 2025-04-17

## 2025-04-17 RX ORDER — PROCHLORPERAZINE 25 MG/1
25 SUPPOSITORY RECTAL EVERY 12 HOURS PRN
Status: DISCONTINUED | OUTPATIENT
Start: 2025-04-17 | End: 2025-04-18 | Stop reason: HOSPADM

## 2025-04-17 RX ORDER — TRANEXAMIC ACID 650 MG/1
1950 TABLET ORAL ONCE
Status: DISCONTINUED | OUTPATIENT
Start: 2025-04-18 | End: 2025-04-18

## 2025-04-17 RX ORDER — LIDOCAINE HYDROCHLORIDE 10 MG/ML
0.1 INJECTION, SOLUTION EPIDURAL; INFILTRATION; INTRACAUDAL; PERINEURAL ONCE
Status: DISCONTINUED | OUTPATIENT
Start: 2025-04-17 | End: 2025-04-17 | Stop reason: HOSPADM

## 2025-04-17 RX ORDER — PHENYLEPHRINE HCL IN 0.9% NACL 1 MG/10 ML
SYRINGE (ML) INTRAVENOUS AS NEEDED
Status: DISCONTINUED | OUTPATIENT
Start: 2025-04-17 | End: 2025-04-17

## 2025-04-17 RX ORDER — GABAPENTIN 300 MG/1
300 CAPSULE ORAL ONCE
Status: COMPLETED | OUTPATIENT
Start: 2025-04-17 | End: 2025-04-17

## 2025-04-17 RX ORDER — MORPHINE SULFATE 2 MG/ML
2 INJECTION, SOLUTION INTRAMUSCULAR; INTRAVENOUS EVERY 2 HOUR PRN
Status: DISCONTINUED | OUTPATIENT
Start: 2025-04-17 | End: 2025-04-18 | Stop reason: HOSPADM

## 2025-04-17 RX ORDER — BISACODYL 5 MG
10 TABLET, DELAYED RELEASE (ENTERIC COATED) ORAL DAILY PRN
Status: DISCONTINUED | OUTPATIENT
Start: 2025-04-17 | End: 2025-04-18 | Stop reason: HOSPADM

## 2025-04-17 RX ORDER — ONDANSETRON HYDROCHLORIDE 2 MG/ML
4 INJECTION, SOLUTION INTRAVENOUS EVERY 8 HOURS PRN
Status: DISCONTINUED | OUTPATIENT
Start: 2025-04-17 | End: 2025-04-18 | Stop reason: HOSPADM

## 2025-04-17 RX ORDER — DOCUSATE SODIUM 100 MG/1
100 CAPSULE, LIQUID FILLED ORAL 2 TIMES DAILY
Status: DISCONTINUED | OUTPATIENT
Start: 2025-04-17 | End: 2025-04-18 | Stop reason: HOSPADM

## 2025-04-17 RX ORDER — CEFAZOLIN SODIUM 2 G/100ML
2 INJECTION, SOLUTION INTRAVENOUS EVERY 8 HOURS
Status: COMPLETED | OUTPATIENT
Start: 2025-04-17 | End: 2025-04-18

## 2025-04-17 RX ORDER — SODIUM CHLORIDE, SODIUM LACTATE, POTASSIUM CHLORIDE, CALCIUM CHLORIDE 600; 310; 30; 20 MG/100ML; MG/100ML; MG/100ML; MG/100ML
100 INJECTION, SOLUTION INTRAVENOUS CONTINUOUS
Status: ACTIVE | OUTPATIENT
Start: 2025-04-17 | End: 2025-04-18

## 2025-04-17 RX ORDER — DROPERIDOL 2.5 MG/ML
0.62 INJECTION, SOLUTION INTRAMUSCULAR; INTRAVENOUS ONCE AS NEEDED
Status: DISCONTINUED | OUTPATIENT
Start: 2025-04-17 | End: 2025-04-17 | Stop reason: HOSPADM

## 2025-04-17 RX ORDER — WATER 1 ML/ML
INJECTION IRRIGATION AS NEEDED
Status: DISCONTINUED | OUTPATIENT
Start: 2025-04-17 | End: 2025-04-17 | Stop reason: HOSPADM

## 2025-04-17 RX ORDER — ACETAMINOPHEN 325 MG/1
975 TABLET ORAL ONCE
Status: COMPLETED | OUTPATIENT
Start: 2025-04-17 | End: 2025-04-17

## 2025-04-17 RX ADMIN — ACETAMINOPHEN 975 MG: 325 TABLET ORAL at 11:28

## 2025-04-17 RX ADMIN — CEFAZOLIN SODIUM 2 G: 2 INJECTION, SOLUTION INTRAVENOUS at 20:58

## 2025-04-17 RX ADMIN — DEXAMETHASONE SODIUM PHOSPHATE: 10 INJECTION, SOLUTION INTRAMUSCULAR; INTRAVENOUS at 12:20

## 2025-04-17 RX ADMIN — SUGAMMADEX 200 MG: 100 INJECTION, SOLUTION INTRAVENOUS at 13:42

## 2025-04-17 RX ADMIN — MIDAZOLAM 2 MG: 1 INJECTION INTRAMUSCULAR; INTRAVENOUS at 12:00

## 2025-04-17 RX ADMIN — TRANEXAMIC ACID 1950 MG: 650 TABLET ORAL at 11:35

## 2025-04-17 RX ADMIN — SODIUM CHLORIDE, SODIUM LACTATE, POTASSIUM CHLORIDE, AND CALCIUM CHLORIDE 100 ML/HR: .6; .31; .03; .02 INJECTION, SOLUTION INTRAVENOUS at 11:34

## 2025-04-17 RX ADMIN — ACETAMINOPHEN 650 MG: 325 TABLET ORAL at 17:13

## 2025-04-17 RX ADMIN — Medication 100 MCG: at 12:25

## 2025-04-17 RX ADMIN — TRANEXAMIC ACID 1950 MG: 650 TABLET ORAL at 20:58

## 2025-04-17 RX ADMIN — POVIDONE-IODINE 1 APPLICATION: 5 SOLUTION TOPICAL at 11:33

## 2025-04-17 RX ADMIN — GABAPENTIN 300 MG: 300 CAPSULE ORAL at 11:36

## 2025-04-17 RX ADMIN — PROPOFOL 200 MG: 10 INJECTION, EMULSION INTRAVENOUS at 12:25

## 2025-04-17 RX ADMIN — CEFAZOLIN SODIUM 2 G: 2 INJECTION, SOLUTION INTRAVENOUS at 12:34

## 2025-04-17 RX ADMIN — SODIUM CHLORIDE, SODIUM LACTATE, POTASSIUM CHLORIDE, AND CALCIUM CHLORIDE 50 ML/HR: .6; .31; .03; .02 INJECTION, SOLUTION INTRAVENOUS at 15:21

## 2025-04-17 RX ADMIN — DOCUSATE SODIUM 100 MG: 100 CAPSULE, LIQUID FILLED ORAL at 20:58

## 2025-04-17 RX ADMIN — ROCURONIUM BROMIDE 50 MG: 10 SOLUTION INTRAVENOUS at 12:25

## 2025-04-17 RX ADMIN — CELECOXIB 200 MG: 200 CAPSULE ORAL at 11:29

## 2025-04-17 RX ADMIN — LIDOCAINE HYDROCHLORIDE 100 MG: 20 INJECTION, SOLUTION INFILTRATION; PERINEURAL at 12:25

## 2025-04-17 SDOH — SOCIAL STABILITY: SOCIAL INSECURITY
WITHIN THE LAST YEAR, HAVE YOU BEEN RAPED OR FORCED TO HAVE ANY KIND OF SEXUAL ACTIVITY BY YOUR PARTNER OR EX-PARTNER?: PATIENT DECLINED

## 2025-04-17 SDOH — ECONOMIC STABILITY: TRANSPORTATION INSECURITY
IN THE PAST 12 MONTHS, HAS LACK OF TRANSPORTATION KEPT YOU FROM MEDICAL APPOINTMENTS OR FROM GETTING MEDICATIONS?: PATIENT DECLINED

## 2025-04-17 SDOH — ECONOMIC STABILITY: HOUSING INSECURITY: IN THE LAST 12 MONTHS, WAS THERE A TIME WHEN YOU WERE NOT ABLE TO PAY THE MORTGAGE OR RENT ON TIME?: PATIENT DECLINED

## 2025-04-17 SDOH — ECONOMIC STABILITY: FOOD INSECURITY
WITHIN THE PAST 12 MONTHS, YOU WORRIED THAT YOUR FOOD WOULD RUN OUT BEFORE YOU GOT THE MONEY TO BUY MORE.: PATIENT DECLINED

## 2025-04-17 SDOH — ECONOMIC STABILITY: FOOD INSECURITY: HOW HARD IS IT FOR YOU TO PAY FOR THE VERY BASICS LIKE FOOD, HOUSING, MEDICAL CARE, AND HEATING?: PATIENT DECLINED

## 2025-04-17 SDOH — ECONOMIC STABILITY: FOOD INSECURITY: WITHIN THE PAST 12 MONTHS, THE FOOD YOU BOUGHT JUST DIDN'T LAST AND YOU DIDN'T HAVE MONEY TO GET MORE.: PATIENT DECLINED

## 2025-04-17 SDOH — ECONOMIC STABILITY: INCOME INSECURITY
IN THE PAST 12 MONTHS HAS THE ELECTRIC, GAS, OIL, OR WATER COMPANY THREATENED TO SHUT OFF SERVICES IN YOUR HOME?: PATIENT DECLINED

## 2025-04-17 SDOH — SOCIAL STABILITY: SOCIAL INSECURITY
WITHIN THE LAST YEAR, HAVE YOU BEEN HUMILIATED OR EMOTIONALLY ABUSED IN OTHER WAYS BY YOUR PARTNER OR EX-PARTNER?: PATIENT DECLINED

## 2025-04-17 SDOH — ECONOMIC STABILITY: HOUSING INSECURITY: IN THE PAST 12 MONTHS, HOW MANY TIMES HAVE YOU MOVED WHERE YOU WERE LIVING?: 0

## 2025-04-17 SDOH — SOCIAL STABILITY: SOCIAL INSECURITY
WITHIN THE LAST YEAR, HAVE YOU BEEN KICKED, HIT, SLAPPED, OR OTHERWISE PHYSICALLY HURT BY YOUR PARTNER OR EX-PARTNER?: PATIENT DECLINED

## 2025-04-17 SDOH — SOCIAL STABILITY: SOCIAL INSECURITY: ARE YOU OR HAVE YOU BEEN THREATENED OR ABUSED PHYSICALLY, EMOTIONALLY, OR SEXUALLY BY ANYONE?: NO

## 2025-04-17 SDOH — SOCIAL STABILITY: SOCIAL INSECURITY: WITHIN THE LAST YEAR, HAVE YOU BEEN AFRAID OF YOUR PARTNER OR EX-PARTNER?: PATIENT DECLINED

## 2025-04-17 SDOH — SOCIAL STABILITY: SOCIAL INSECURITY: HAVE YOU HAD ANY THOUGHTS OF HARMING ANYONE ELSE?: NO

## 2025-04-17 SDOH — SOCIAL STABILITY: SOCIAL INSECURITY: DOES ANYONE TRY TO KEEP YOU FROM HAVING/CONTACTING OTHER FRIENDS OR DOING THINGS OUTSIDE YOUR HOME?: NO

## 2025-04-17 SDOH — SOCIAL STABILITY: SOCIAL INSECURITY: DO YOU FEEL UNSAFE GOING BACK TO THE PLACE WHERE YOU ARE LIVING?: NO

## 2025-04-17 SDOH — SOCIAL STABILITY: SOCIAL INSECURITY: ABUSE: ADULT

## 2025-04-17 SDOH — ECONOMIC STABILITY: HOUSING INSECURITY: AT ANY TIME IN THE PAST 12 MONTHS, WERE YOU HOMELESS OR LIVING IN A SHELTER (INCLUDING NOW)?: PATIENT DECLINED

## 2025-04-17 SDOH — SOCIAL STABILITY: SOCIAL INSECURITY: DO YOU FEEL ANYONE HAS EXPLOITED OR TAKEN ADVANTAGE OF YOU FINANCIALLY OR OF YOUR PERSONAL PROPERTY?: NO

## 2025-04-17 SDOH — SOCIAL STABILITY: SOCIAL INSECURITY: HAVE YOU HAD THOUGHTS OF HARMING ANYONE ELSE?: NO

## 2025-04-17 SDOH — SOCIAL STABILITY: SOCIAL INSECURITY: ARE THERE ANY APPARENT SIGNS OF INJURIES/BEHAVIORS THAT COULD BE RELATED TO ABUSE/NEGLECT?: NO

## 2025-04-17 SDOH — SOCIAL STABILITY: SOCIAL INSECURITY: HAS ANYONE EVER THREATENED TO HURT YOUR FAMILY OR YOUR PETS?: NO

## 2025-04-17 SDOH — SOCIAL STABILITY: SOCIAL INSECURITY: WERE YOU ABLE TO COMPLETE ALL THE BEHAVIORAL HEALTH SCREENINGS?: YES

## 2025-04-17 ASSESSMENT — ACTIVITIES OF DAILY LIVING (ADL)
DRESSING YOURSELF: INDEPENDENT
HEARING - LEFT EAR: FUNCTIONAL
LACK_OF_TRANSPORTATION: PATIENT DECLINED
LACK_OF_TRANSPORTATION: NO
BATHING: INDEPENDENT
PATIENT'S MEMORY ADEQUATE TO SAFELY COMPLETE DAILY ACTIVITIES?: YES
JUDGMENT_ADEQUATE_SAFELY_COMPLETE_DAILY_ACTIVITIES: YES
FEEDING YOURSELF: INDEPENDENT
TOILETING: INDEPENDENT
GROOMING: INDEPENDENT
ADEQUATE_TO_COMPLETE_ADL: YES
LACK_OF_TRANSPORTATION: PATIENT DECLINED
WALKS IN HOME: INDEPENDENT
HEARING - RIGHT EAR: FUNCTIONAL

## 2025-04-17 ASSESSMENT — COGNITIVE AND FUNCTIONAL STATUS - GENERAL
DRESSING REGULAR UPPER BODY CLOTHING: A LOT
DRESSING REGULAR LOWER BODY CLOTHING: A LOT
MOVING TO AND FROM BED TO CHAIR: A LITTLE
DRESSING REGULAR UPPER BODY CLOTHING: A LOT
EATING MEALS: A LITTLE
DAILY ACTIVITIY SCORE: 16
CLIMB 3 TO 5 STEPS WITH RAILING: A LITTLE
DAILY ACTIVITIY SCORE: 16
MOVING FROM LYING ON BACK TO SITTING ON SIDE OF FLAT BED WITH BEDRAILS: A LOT
MOVING TO AND FROM BED TO CHAIR: A LITTLE
PATIENT BASELINE BEDBOUND: NO
HELP NEEDED FOR BATHING: A LITTLE
TURNING FROM BACK TO SIDE WHILE IN FLAT BAD: A LOT
STANDING UP FROM CHAIR USING ARMS: A LITTLE
WALKING IN HOSPITAL ROOM: A LITTLE
CLIMB 3 TO 5 STEPS WITH RAILING: A LITTLE
TOILETING: A LITTLE
PERSONAL GROOMING: A LITTLE
MOVING FROM LYING ON BACK TO SITTING ON SIDE OF FLAT BED WITH BEDRAILS: A LITTLE
DRESSING REGULAR LOWER BODY CLOTHING: A LOT
PERSONAL GROOMING: A LITTLE
EATING MEALS: A LITTLE
WALKING IN HOSPITAL ROOM: A LITTLE
MOBILITY SCORE: 16
MOBILITY SCORE: 18
HELP NEEDED FOR BATHING: A LITTLE
TURNING FROM BACK TO SIDE WHILE IN FLAT BAD: A LITTLE
STANDING UP FROM CHAIR USING ARMS: A LITTLE
TOILETING: A LITTLE

## 2025-04-17 ASSESSMENT — PATIENT HEALTH QUESTIONNAIRE - PHQ9
SUM OF ALL RESPONSES TO PHQ9 QUESTIONS 1 & 2: 0
2. FEELING DOWN, DEPRESSED OR HOPELESS: NOT AT ALL
1. LITTLE INTEREST OR PLEASURE IN DOING THINGS: NOT AT ALL

## 2025-04-17 ASSESSMENT — PAIN SCALES - GENERAL
PAINLEVEL_OUTOF10: 8
PAINLEVEL_OUTOF10: 0 - NO PAIN
PAIN_LEVEL: 0
PAINLEVEL_OUTOF10: 0 - NO PAIN
PAINLEVEL_OUTOF10: 0 - NO PAIN

## 2025-04-17 ASSESSMENT — PAIN - FUNCTIONAL ASSESSMENT
PAIN_FUNCTIONAL_ASSESSMENT: 0-10

## 2025-04-17 ASSESSMENT — LIFESTYLE VARIABLES
HOW OFTEN DO YOU HAVE A DRINK CONTAINING ALCOHOL: NEVER
AUDIT-C TOTAL SCORE: 0
SKIP TO QUESTIONS 9-10: 1
HOW OFTEN DO YOU HAVE 6 OR MORE DRINKS ON ONE OCCASION: NEVER
HOW MANY STANDARD DRINKS CONTAINING ALCOHOL DO YOU HAVE ON A TYPICAL DAY: PATIENT DOES NOT DRINK
AUDIT-C TOTAL SCORE: 0

## 2025-04-17 ASSESSMENT — PAIN DESCRIPTION - DESCRIPTORS: DESCRIPTORS: STABBING

## 2025-04-17 NOTE — ANESTHESIA PREPROCEDURE EVALUATION
Patient: Nan Crane    Procedure Information       Date/Time: 04/17/25 1249    Procedure: ARTHROPLASTY TOTAL SHOULDER - REVERSE (Right: Shoulder) - 23HRS OBS BEDDED  diabetic  BEACH CHAIR  FAY    Location: [facility] / [facility]    Surgeons: Pavan Gilbert MD            Relevant Problems   Cardiac   (+) HTN (hypertension)   (+) Hyperlipidemia      Pulmonary   (+) Malignant neoplasm of upper lobe of right lung (Multi)      Neuro   (+) Adult situational stress disorder   (+) Depression, major, single episode, moderate (Multi)   (+) Sciatica   (+) TIA (transient ischemic attack)      /Renal   (+) UTI (urinary tract infection)      Endocrine   (+) Controlled diabetes mellitus with diabetic nephropathy   (+) Controlled type 2 diabetes mellitus without complication, without long-term current use of insulin   (+) Gastroparesis due to DM (Multi)   (+) Proteinuria due to type 2 diabetes mellitus (Multi)   (+) Type 2 diabetes mellitus with stage 3b chronic kidney disease, without long-term current use of insulin (Multi)      Musculoskeletal   (+) OA (osteoarthritis) of knee   (+) Osteoarthritis, localized, shoulder   (+) Primary osteoarthritis, right shoulder      HEENT   (+) Sinobronchitis      ID   (+) Fungal nail infection   (+) UTI (urinary tract infection)   (+) Vaginitis due to Candida      Skin   (+) Dyshidrosis      GYN   (+) Fibroid uterus       Clinical information reviewed:                   NPO Detail:  No data recorded     Physical Exam    Airway  Mallampati: II     Cardiovascular   Rhythm: regular  Rate: normal     Dental    Pulmonary - normal exam   Abdominal - normal exam           Anesthesia Plan    History of general anesthesia?: yes  History of complications of general anesthesia?: no    ASA 3     general and regional     intravenous induction   Postoperative pain plan includes opioids.  Anesthetic plan and risks discussed with patient.

## 2025-04-17 NOTE — OP NOTE
ARTHROPLASTY TOTAL SHOULDER - REVERSE (R) Operative Note  Preop diagnosis: Right shoulder osteoarthritis and rotator cuff tear right shoulder osteoarthritis and rotator cuff tear    Postop diagnosis:      Assistant: Pavan Rios physician assistant (PAC) was required and present throughout the entire case.  Given the nature of the disease process and the procedure, a skilled surgical first assistant was necessary during the entire case.  The assistant was necessary to hold retractors and manipulate extremity during the procedure.  A certified scrub tech was also present at the back table managing instruments with supplies for the surgical case    Date: 2025  OR Location: [facility]    Name: Nan Crane, : 1950, Age: 75 y.o., MRN: [id number], Sex: female    Diagnosis  Pre-op Diagnosis      * Primary osteoarthritis, right shoulder [M19.011]     * Complete rotator cuff tear or rupture of right shoulder, not specified as traumatic [M75.121] Post-op Diagnosis     * Primary osteoarthritis, right shoulder [M19.011]     * Complete rotator cuff tear or rupture of right shoulder, not specified as traumatic [M75.121]     Procedures  ARTHROPLASTY TOTAL SHOULDER - REVERSE  [id number] - UT ARTHROPLASTY GLENOHUMERAL JOINT TOTAL SHOULDER      Surgeons      * Pavan Gilbert - Primary    Resident/Fellow/Other Assistant:  Surgeons and Role:     * Pavan Rios PA-C - Assisting    Staff:       Anesthesia Staff: Anesthesiologist: Phi Hinkle DO  CRNA: MATTHIAS Issa-DANIELLE    Procedure Summary  Anesthesia: Regional, General  ASA: III  Estimated Blood Loss: Less than 30 mL  Intra-op Medications: Administrations occurring from 1254 to 1514 on 25:  * No intraprocedure medications in log *           Anesthesia Record               Intraprocedure I/O Totals          Intake    lactated Ringer's infusion 1000.00 mL    Total Intake 1000 mL          Specimen: No specimens collected               Drains and/or Catheters: * None in log *    Tourniquet Times:         Implants:  Implants       Type Name Action Serial No.      Screw PIN, TM REVERSE 2.5MM - [id number] Used, Not Implanted      Screw PIN, STEINMANN, THREADED TIP, 1/8 X 2.5 IN, LONG - [id number] Implanted      Joint HUMERAL STEM, 14MM ILAN X 130MM LGTH, REVERSE SHOULDER SYSTEM - [id number] Implanted      Joint BASE PLATE, MMTM RVS, 26MM, 15MM - [id number] Implanted       40MM TRABECULAR METAL REVERSE PLUS GLENOSPHERE, +3MM LATERAL OFFSET Implanted      Joint POLY LINER, PLUS 3MM OFFSET, 40MM ILAN, REVERSE SHOULDER SYSTEM - [id number] Implanted               Findings: see procedure details    Indications: Nan Crane is an 75 y.o. female who is having surgery for Primary osteoarthritis, right shoulder [M19.011]  Complete rotator cuff tear or rupture of right shoulder, not specified as traumatic [M75.121].     The patient was seen in the preoperative area. The risks, benefits, complications, treatment options, non-operative alternatives, expected recovery and outcomes were discussed with the patient. The possibilities of reaction to medication, pulmonary aspiration, injury to surrounding structures, bleeding, recurrent infection, the need for additional procedures, failure to diagnose a condition, and creating a complication requiring transfusion or operation were discussed with the patient. The patient concurred with the proposed plan, giving informed consent.  The site of surgery was properly noted/marked if necessary per policy. The patient has been actively warmed in preoperative area. Preoperative antibiotics have been ordered and given within 1 hours of incision.    Procedure Details:   The patient was placed in the beachchair position and underwent routine prep and drape of the  right shoulder  after induction of anesethia    Standard deltopectoral approach was used splitting skin for roughly 8-10 cm starting at the coracoid and extending down  the arm.  Subcutaneous flaps were created and we exposed the deltopectoral interval.  Cephalic vein and deltoid were taken superior laterally and the pectoralis major released inferiorly.  We released part of the pectoralis off the humerus to aid with exposure.  We bluntly dissected under the conjoined tendon and placed a retractor medially against the conjoined tendon to protect the neurovascular structures.  Laterally we retracted off the deltoid attacking the vein.    The subscapularis partly intact.  The inferior two thirds was peeled off the lesser tuberosity.  It was tagged to be repaired later at the end of the case through if it hold the prosthesis with a #2 FiberWire.  There was severe end-stage arthrosis with significant bony erosion and posterior wear of the glenoid and humeral head.  There was absent supraspinatus tendon superiorly.  We proceeded with reverse total shoulder replacement      The long head of the biceps tendon  was noticeably absent.        The humeral head was easily dislocated at this time with external rotation.  The osteophytes were removed circumferentially around the humerus and we began hand reaming the humerus to a final diameter of 14 mm.    Using the appropriate cutting guide the humeral head was cut at 20° retroversion and further osteophytes were cleaned removed posteriorly to aid in the exposure and implant insertion.    At this time retractors were placed on the posteriorly,  superiorly,  and anteriorly along the glenoid rim to allow for complete exposure to the glenoid face.   We then cleaned and removed degenerative labral tissue and old bicep tissue from around the rim of the glenoid to further provide exposure to the glenoid.     A centering pin was placed in the glenoid to correct version, a centering hole was then created and we planed the glenoid to a smooth flush surface.  After final preparation the baseplate was inserted with a 15 mm stem.  The purchase was  "excellent and we proceeded to fill the baseplate with 2 bicortical screw 38 mm and 38 mm  in length, and then placed  \"end caps\" on the screws so as to convert them to locking screws.  A 40 mm glenosphere with +3 mm of lateral offset was fixed on the baseplate and its locking mechanism was also confirmed.    At this time final preparation of the humeral stem was completed and we inserted the 14 mm reverse compatible trabecular metal stem at 20° retroversion at the appropriate height and inclination.    We then trialed liners and after multiple reductions we placed a final buildup of 3 mL of mm with a 60 degree constraint radius.    Once we completed the reduction of the shoulder, it was stable in all planes checked with no subcoracoid or subacromial impingement.  We lavaged and  irrigated and we closed what remnant of the subscapularis remained to  further enhance  stability.  Final  lavaging and irrigaion throughout the entire wound was completed and  hemostasis was confirmed.      We allow the deltopectoral interval to close loosely with 2-0 Vicryl, closure of the subcutaneous tissue with 2-0 Vicryl and a running 4-0 Monocryl was completed followed by application of a compressive dressing.  A drain was used if needed and the patient was taken to recovery room and an xr was taken and is pending review.      Complications:  None; patient tolerated the procedure well.    Disposition: PACU - hemodynamically stable.  Condition: stable         Pavan Gilbert  Phone Number: [phone]      "

## 2025-04-17 NOTE — ANESTHESIA POSTPROCEDURE EVALUATION
Patient: Nan Crane    Procedure Summary       Date: 04/17/25 Room / Location: [facility]    Anesthesia Start: 1221 Anesthesia Stop: 1354    Procedure: ARTHROPLASTY TOTAL SHOULDER - REVERSE (Right: Shoulder) Diagnosis:       Primary osteoarthritis, right shoulder      Complete rotator cuff tear or rupture of right shoulder, not specified as traumatic      (Primary osteoarthritis, right shoulder [M19.011])      (Complete rotator cuff tear or rupture of right shoulder, not specified as traumatic [M75.121])    Surgeons: Pavan Gilbert MD Responsible Provider: Phi Hinkle DO    Anesthesia Type: general, regional ASA Status: 3            Anesthesia Type: general, regional    Vitals Value Taken Time   /66 04/17/25 13:51   Temp 36 04/17/25 13:55   Pulse 82 04/17/25 13:53   Resp 15 04/17/25 13:53   SpO2 96 % 04/17/25 13:53   Vitals shown include unfiled device data.    Anesthesia Post Evaluation    Patient location during evaluation: PACU  Patient participation: complete - patient participated  Level of consciousness: awake and alert  Pain score: 0  Pain management: adequate  Airway patency: patent  Cardiovascular status: acceptable  Respiratory status: acceptable  Hydration status: acceptable  Postoperative Nausea and Vomiting: none        No notable events documented.

## 2025-04-17 NOTE — ANESTHESIA PROCEDURE NOTES
Airway  Date/Time: 4/17/2025 12:25 PM  Reason: elective    Airway not difficult    Staffing  Performed: CRNA   Authorized by: Phi Hinkle DO    Performed by: MATTHIAS Issa-DANIELLE  Patient location during procedure: OR    Patient Condition  Indications for airway management: anesthesia  Patient position: sniffing  MILS maintained throughout  Planned trial extubation  Sedation level: deep     Final Airway Details   Preoxygenated: yes  Final airway type: endotracheal airway  Successful airway: ETT  Cuffed: yes   Successful intubation technique: direct laryngoscopy  Adjuncts used in placement: intubating stylet  Endotracheal tube insertion site: oral  Blade: Jennifer  Blade size: #3  ETT size (mm): 7.0  Cormack-Lehane Classification: grade IIa - partial view of glottis  Placement verified by: chest auscultation and capnometry   Measured from: teeth  ETT to teeth (cm): 21  Number of attempts at approach: 1

## 2025-04-17 NOTE — PROGRESS NOTES
04/17/25 1843   Discharge Planning   Living Arrangements Alone  (Lives alone, states her son will be staying with her to assist)   Support Systems Children;Family members   Assistance Needed none, PTA pt states independent ADLS and IADLS no AD, drives, denies falls   Type of Residence Private residence  (1 level home + basement (laundry))   Number of Stairs to Enter Residence 1   Number of Stairs Within Residence 12   Do you have animals or pets at home? No   Home or Post Acute Services None   Expected Discharge Disposition Home   Does the patient need discharge transport arranged? No   Financial Resource Strain   How hard is it for you to pay for the very basics like food, housing, medical care, and heating? Not hard   Housing Stability   In the last 12 months, was there a time when you were not able to pay the mortgage or rent on time? N   In the past 12 months, how many times have you moved where you were living? 0   At any time in the past 12 months, were you homeless or living in a shelter (including now)? N   Transportation Needs   In the past 12 months, has lack of transportation kept you from medical appointments or from getting medications? no   In the past 12 months, has lack of transportation kept you from meetings, work, or from getting things needed for daily living? No   Stroke Family Assessment   Stroke Family Assessment Needed No   Intensity of Service   Intensity of Service 0-30 min     Pt is s/p Elective Right reverse total shoulder arthroplasty 4/17/25 with Dr. Pavan Gilbert. PT/OT eval AMPAC scores are currently pending. Pt states discharge preference is home, son to stay with her to assist. Demographics verified. CT team will monitor case for progression and follow up for PT/OT eval AMPAC scores to aide in discharge planning/potential DC needs.

## 2025-04-17 NOTE — ANESTHESIA PROCEDURE NOTES
Peripheral Block    Patient location during procedure: pre-op  Start time: 4/17/2025 12:00 PM  End time: 4/17/2025 12:10 PM  Reason for block: at surgeon's request and post-op pain management  Staffing  Performed: attending   Authorized by: Phi Hinkle DO    Performed by: Phi Hinkle DO  Preanesthetic Checklist  Completed: patient identified, IV checked, site marked, risks and benefits discussed, surgical consent, monitors and equipment checked, pre-op evaluation and timeout performed   Timeout performed at:   Peripheral Block  Patient position: laying flat  Prep: ChloraPrep  Patient monitoring: heart rate, continuous pulse ox and cardiac monitor  Injection technique: single-shot  Guidance: ultrasound guided  Local infiltration: lidocaine  Infiltration strength: 1 %  Dose: 2 mL  Needle  Needle gauge: 21 G  Needle length: 10 cm  Needle localization: ultrasound guidance     image stored in chart  Assessment  Injection assessment: negative aspiration for heme, no paresthesia on injection, incremental injection and local visualized surrounding nerve on ultrasound  Heart rate change: no  Additional Notes  Time out performed. 20 ml .5 % Ropivacaine with 5mg Decadron used in divided doses. Patient tolerated procedure well.

## 2025-04-18 ENCOUNTER — DOCUMENTATION (OUTPATIENT)
Dept: HOME HEALTH SERVICES | Facility: HOME HEALTH | Age: 75
End: 2025-04-18
Payer: COMMERCIAL

## 2025-04-18 ENCOUNTER — HOME HEALTH ADMISSION (OUTPATIENT)
Dept: HOME HEALTH SERVICES | Facility: HOME HEALTH | Age: 75
End: 2025-04-18
Payer: COMMERCIAL

## 2025-04-18 ENCOUNTER — PHARMACY VISIT (OUTPATIENT)
Dept: PHARMACY | Facility: CLINIC | Age: 75
End: 2025-04-18
Payer: MEDICARE

## 2025-04-18 VITALS
HEART RATE: 76 BPM | RESPIRATION RATE: 16 BRPM | WEIGHT: 152.12 LBS | HEIGHT: 62 IN | DIASTOLIC BLOOD PRESSURE: 55 MMHG | TEMPERATURE: 97.5 F | SYSTOLIC BLOOD PRESSURE: 115 MMHG | BODY MASS INDEX: 27.99 KG/M2 | OXYGEN SATURATION: 95 %

## 2025-04-18 PROBLEM — M19.011 OSTEOARTHRITIS OF RIGHT SHOULDER, UNSPECIFIED OSTEOARTHRITIS TYPE: Status: ACTIVE | Noted: 2025-04-18

## 2025-04-18 LAB
ANION GAP SERPL CALC-SCNC: 11 MMOL/L (ref 10–20)
BUN SERPL-MCNC: 48 MG/DL (ref 6–23)
CALCIUM SERPL-MCNC: 9.4 MG/DL (ref 8.6–10.3)
CHLORIDE SERPL-SCNC: 111 MMOL/L (ref 98–107)
CO2 SERPL-SCNC: 20 MMOL/L (ref 21–32)
CREAT SERPL-MCNC: 1.45 MG/DL (ref 0.5–1.05)
EGFRCR SERPLBLD CKD-EPI 2021: 38 ML/MIN/1.73M*2
ERYTHROCYTE [DISTWIDTH] IN BLOOD BY AUTOMATED COUNT: 12.6 % (ref 11.5–14.5)
GLUCOSE BLD MANUAL STRIP-MCNC: 137 MG/DL (ref 74–99)
GLUCOSE BLD MANUAL STRIP-MCNC: 191 MG/DL (ref 74–99)
GLUCOSE SERPL-MCNC: 101 MG/DL (ref 74–99)
HCT VFR BLD AUTO: 31.3 % (ref 36–46)
HGB BLD-MCNC: 10.2 G/DL (ref 12–16)
MCH RBC QN AUTO: 30.3 PG (ref 26–34)
MCHC RBC AUTO-ENTMCNC: 32.6 G/DL (ref 32–36)
MCV RBC AUTO: 93 FL (ref 80–100)
NRBC BLD-RTO: 0 /100 WBCS (ref 0–0)
PLATELET # BLD AUTO: 205 X10*3/UL (ref 150–450)
POTASSIUM SERPL-SCNC: 3.9 MMOL/L (ref 3.5–5.3)
RBC # BLD AUTO: 3.37 X10*6/UL (ref 4–5.2)
SODIUM SERPL-SCNC: 138 MMOL/L (ref 136–145)
WBC # BLD AUTO: 8 X10*3/UL (ref 4.4–11.3)

## 2025-04-18 PROCEDURE — 97161 PT EVAL LOW COMPLEX 20 MIN: CPT | Mod: GP

## 2025-04-18 PROCEDURE — 97535 SELF CARE MNGMENT TRAINING: CPT | Mod: GO

## 2025-04-18 PROCEDURE — RXMED WILLOW AMBULATORY MEDICATION CHARGE

## 2025-04-18 PROCEDURE — 2500000001 HC RX 250 WO HCPCS SELF ADMINISTERED DRUGS (ALT 637 FOR MEDICARE OP): Performed by: ORTHOPAEDIC SURGERY

## 2025-04-18 PROCEDURE — 80048 BASIC METABOLIC PNL TOTAL CA: CPT | Performed by: ORTHOPAEDIC SURGERY

## 2025-04-18 PROCEDURE — 82947 ASSAY GLUCOSE BLOOD QUANT: CPT

## 2025-04-18 PROCEDURE — 7100000011 HC EXTENDED STAY RECOVERY HOURLY - NURSING UNIT

## 2025-04-18 PROCEDURE — 85027 COMPLETE CBC AUTOMATED: CPT | Performed by: ORTHOPAEDIC SURGERY

## 2025-04-18 PROCEDURE — 97165 OT EVAL LOW COMPLEX 30 MIN: CPT | Mod: GO

## 2025-04-18 PROCEDURE — 2500000002 HC RX 250 W HCPCS SELF ADMINISTERED DRUGS (ALT 637 FOR MEDICARE OP, ALT 636 FOR OP/ED): Performed by: STUDENT IN AN ORGANIZED HEALTH CARE EDUCATION/TRAINING PROGRAM

## 2025-04-18 PROCEDURE — 2500000004 HC RX 250 GENERAL PHARMACY W/ HCPCS (ALT 636 FOR OP/ED): Performed by: ORTHOPAEDIC SURGERY

## 2025-04-18 PROCEDURE — 36415 COLL VENOUS BLD VENIPUNCTURE: CPT | Performed by: ORTHOPAEDIC SURGERY

## 2025-04-18 RX ORDER — DOCUSATE SODIUM 100 MG/1
100 CAPSULE, LIQUID FILLED ORAL 2 TIMES DAILY
Qty: 20 CAPSULE | Refills: 0 | Status: SHIPPED | OUTPATIENT
Start: 2025-04-18 | End: 2025-04-28

## 2025-04-18 RX ORDER — OXYCODONE HYDROCHLORIDE 5 MG/1
5 TABLET ORAL EVERY 6 HOURS PRN
Qty: 28 TABLET | Refills: 0 | Status: SHIPPED | OUTPATIENT
Start: 2025-04-18 | End: 2025-04-25

## 2025-04-18 RX ORDER — INSULIN LISPRO 100 [IU]/ML
0-15 INJECTION, SOLUTION INTRAVENOUS; SUBCUTANEOUS
Status: DISCONTINUED | OUTPATIENT
Start: 2025-04-18 | End: 2025-04-18 | Stop reason: HOSPADM

## 2025-04-18 RX ORDER — DEXTROSE 50 % IN WATER (D50W) INTRAVENOUS SYRINGE
12.5
Status: DISCONTINUED | OUTPATIENT
Start: 2025-04-18 | End: 2025-04-18 | Stop reason: HOSPADM

## 2025-04-18 RX ORDER — CYCLOBENZAPRINE HCL 5 MG
5 TABLET ORAL 3 TIMES DAILY PRN
Qty: 21 TABLET | Refills: 0 | Status: SHIPPED | OUTPATIENT
Start: 2025-04-18 | End: 2025-04-25

## 2025-04-18 RX ORDER — INSULIN LISPRO 100 [IU]/ML
12 INJECTION, SOLUTION INTRAVENOUS; SUBCUTANEOUS ONCE
Status: COMPLETED | OUTPATIENT
Start: 2025-04-18 | End: 2025-04-18

## 2025-04-18 RX ORDER — DEXTROSE 50 % IN WATER (D50W) INTRAVENOUS SYRINGE
25
Status: DISCONTINUED | OUTPATIENT
Start: 2025-04-18 | End: 2025-04-18 | Stop reason: HOSPADM

## 2025-04-18 RX ORDER — TRANEXAMIC ACID 650 MG/1
1950 TABLET ORAL ONCE
Status: COMPLETED | OUTPATIENT
Start: 2025-04-18 | End: 2025-04-18

## 2025-04-18 RX ADMIN — ACETAMINOPHEN 650 MG: 325 TABLET ORAL at 05:34

## 2025-04-18 RX ADMIN — INSULIN LISPRO 12 UNITS: 100 INJECTION, SOLUTION INTRAVENOUS; SUBCUTANEOUS at 00:29

## 2025-04-18 RX ADMIN — INSULIN LISPRO 3 UNITS: 100 INJECTION, SOLUTION INTRAVENOUS; SUBCUTANEOUS at 11:52

## 2025-04-18 RX ADMIN — ACETAMINOPHEN 650 MG: 325 TABLET ORAL at 00:13

## 2025-04-18 RX ADMIN — TRANEXAMIC ACID 1950 MG: 650 TABLET ORAL at 06:48

## 2025-04-18 RX ADMIN — ACETAMINOPHEN 650 MG: 325 TABLET ORAL at 11:52

## 2025-04-18 RX ADMIN — CEFAZOLIN SODIUM 2 G: 2 INJECTION, SOLUTION INTRAVENOUS at 04:45

## 2025-04-18 ASSESSMENT — PAIN DESCRIPTION - ORIENTATION
ORIENTATION: RIGHT
ORIENTATION: RIGHT

## 2025-04-18 ASSESSMENT — PAIN SCALES - GENERAL
PAINLEVEL_OUTOF10: 2
PAINLEVEL_OUTOF10: 0 - NO PAIN

## 2025-04-18 ASSESSMENT — COGNITIVE AND FUNCTIONAL STATUS - GENERAL
DAILY ACTIVITIY SCORE: 17
DRESSING REGULAR LOWER BODY CLOTHING: A LITTLE
HELP NEEDED FOR BATHING: A LOT
PERSONAL GROOMING: A LITTLE
EATING MEALS: A LITTLE
MOBILITY SCORE: 24
DRESSING REGULAR UPPER BODY CLOTHING: A LITTLE
TOILETING: A LITTLE

## 2025-04-18 ASSESSMENT — PAIN DESCRIPTION - LOCATION: LOCATION: SHOULDER

## 2025-04-18 ASSESSMENT — PAIN - FUNCTIONAL ASSESSMENT
PAIN_FUNCTIONAL_ASSESSMENT: 0-10

## 2025-04-18 ASSESSMENT — ACTIVITIES OF DAILY LIVING (ADL)
BATHING_ASSISTANCE: MODERATE
HOME_MANAGEMENT_TIME_ENTRY: 30

## 2025-04-18 NOTE — PROGRESS NOTES
Physical Therapy    Physical Therapy Evaluation    Patient Name: Nan Crane  MRN: [id number]  Today's Date: 4/18/2025   Time Calculation  Start Time: 0834  Stop Time: 0850  Time Calculation (min): 16 min  607/607-A    Assessment/Plan   PT Assessment  Barriers to Discharge Home: No anticipated barriers  End of Session Communication: Bedside nurse  Assessment Comment: PT eval only pt IND with mobility and gait. no current PT needs.  End of Session Patient Position: Up in chair, Alarm on  IP OR SWING BED PT PLAN  Inpatient or Swing Bed: Inpatient  PT Plan  PT Plan: PT Eval only  PT Eval Only Reason: Safe to return home  PT Frequency: PT eval only  PT Discharge Recommendations: No further acute PT  PT Recommended Transfer Status: Independent  PT - OK to Discharge: Yes (once medically ready for discharge .)    Subjective     Current Problem:  1. Primary osteoarthritis, right shoulder        2. Complete tear of right rotator cuff, unspecified whether traumatic        3. Osteoarthritis of right shoulder, unspecified osteoarthritis type  cyclobenzaprine (Flexeril) 5 mg tablet    docusate sodium (Colace) 100 mg capsule    oxyCODONE (Roxicodone) 5 mg immediate release tablet        Problem List[1]  General Visit Information:  General  Reason for Referral: S/P  R TSR  Referred By: OT/PIPPA Gilbert 4/17  Past Medical History Relevant to Rehab: HLD,HTN,OA,depression,CKD,DM2,GERD,BPPV,rotator cuff tear R UE .  Prior to Session Communication: Bedside nurse (cleared to see for therapy eval)  Patient Position Received: Bed, 3 rail up, Alarm off, not on at start of session (pt has Ultrasling on R UE .  IV)  General Comment: pt is a 74 yo female came in for elective R  reverse TSR with Dr. CARMEL Gilbert . on 4/17  Home Living:  Home Living  Home Living Comments: pt lives alone in a 1 level home. 2 steps with HR to enter.  laundry in basement flight of steps with rail down.   walk in shower no equipment (son will be staying with her to help  .)  Prior Level of Function:  Prior Function Per Pt/Caregiver Report  Prior Function Comments: per pt IND with all mobility adls and iadls.  no falls drives  has a FWW , SPC and QC at home.  Precautions:  Precautions  UE Weight Bearing Status: Right Non-Weight Bearing  Medical Precautions: No known precautions/limitation  Precautions Comment: ultrasling R UE No active movement of operative shoulder. Patient to remain in sling at all times unless with therapy. Patient can come out of the sling for active range of motion of elbow, wrist, hand and fingers with therapy only. NWB to operative extremity. For anatomic total shoulder replacement, PROM and AAROM for external rotation not passed 30 degrees  Objective   Pain:  Pain Assessment  Pain Assessment: 0-10  0-10 (Numeric) Pain Score: 0 - No pain  Cognition:  Cognition  Overall Cognitive Status: Within Functional Limits  Orientation Level: Oriented X4  General Assessments:  Activity Tolerance  Endurance: Endurance does not limit participation in activity  Sensation  Sensation Comment: intact BLEs  Strength  Strength Comments: BLEs 5/5     Coordination  Movements are Fluid and Coordinated: Yes     Static Sitting Balance  Static Sitting-Comment/Number of Minutes: good  Dynamic Sitting Balance  Dynamic Sitting-Comments: good  Static Standing Balance  Static Standing-Comment/Number of Minutes: good  Dynamic Standing Balance  Dynamic Standing-Comments: good    Functional Assessments:     Bed Mobility  Bed Mobility: Yes  Bed Mobility 1  Bed Mobility 1: Supine to sitting  Level of Assistance 1: Modified independent  Bed Mobility Comments 1: mod  I to EOB with use of rail  Transfers  Transfer: Yes  Transfer 1  Technique 1: Sit to stand, Stand to sit  Transfer Level of Assistance 1: Independent  Trials/Comments 1: IND no A/D  Ambulation/Gait Training  Ambulation/Gait Training Performed: Yes  Ambulation/Gait Training 1  Surface 1: Level tile  Device 1: No device  Assistance 1:  Independent  Quality of Gait 1:  (slower gait speed)  Comments/Distance (ft) 1: 200ft IND no A/D  Stairs  Stairs: Yes  Stairs  Comment/Number of Steps 1: up down 3 steps with rail mod I .  Extremity/Trunk Assessments:  RUE   RUE :  (ultra sling no ROM .)     RLE   RLE : Within Functional Limits  LLE   LLE : Within Functional Limits  Outcome Measures:     [facility] Basic Mobility  Turning from your back to your side while in a flat bed without using bedrails: None  Moving from lying on your back to sitting on the side of a flat bed without using bedrails: None  Moving to and from bed to chair (including a wheelchair): None  Standing up from a chair using your arms (e.g. wheelchair or bedside chair): None  To walk in hospital room: None  Climbing 3-5 steps with railing: None  Basic Mobility - Total Score: 24         [1]   Patient Active Problem List  Diagnosis    Abnormal blood chemistry    Acute adjustment disorder with anxiety    Adult situational stress disorder    Allergic rhinitis    Arthritis of hand    BPV (benign positional vertigo)    Cervical spine pain    Change in vision    Chronic pain of toe of left foot    Chronic pain of toe of right foot    CKD (chronic kidney disease) stage 3, GFR 30-59 ml/min (Multi)    Constipation    Depression, major, single episode, moderate (Multi)    Type 2 diabetes mellitus with stage 3b chronic kidney disease, without long-term current use of insulin (Multi)    Dizziness    Dry eye syndrome    Dry mouth not due to sicca syndrome    Dysfunction of both eustachian tubes    Effusion of right knee    Fatigue    Fibroid uterus    Fullness of neck    Fungal nail infection    Gastro-esophageal reflux disease with esophagitis    Hemianopia of left eye    HTN (hypertension)    Hypercalcemia    Hyperlipidemia    Hypokalemia    Knee pain    Lung nodule    Malignant neoplasm of upper lobe of right lung (Multi)    Muscle spasm    Nasal valve collapse    Vomiting    Neuroma of foot    Nevus     Nocturnal leg cramps    OA (osteoarthritis) of knee    Osteoarthritis, localized, shoulder    Posterior capsular opacification non visually significant of left eye    Posterior capsular opacification non visually significant of right eye    Post-nasal drip    Pseudophakia of left eye    Pseudophakia of right eye    Right knee pain    S/P total knee replacement    Sciatica    Seborrheic keratosis    Shoulder pain    Shoulder weakness    Sinobronchitis    Stiffness of cervical spine    Stiffness of right knee, not elsewhere classified    Strain of patellar tendon, sequela    Stress at home    Stye    TIA (transient ischemic attack)    Toe pain    Urge incontinence of urine    UTI (urinary tract infection)    Vaginitis due to Candida    Vitamin D deficiency    Vulvar cyst    Xerosis of skin    Bone deformity    Controlled diabetes mellitus with diabetic nephropathy    Dyshidrosis    Glaucoma suspect of both eyes    Neck pain    Plantar fasciitis    Pre-ulcerative corn or callous    Proteinuria due to type 2 diabetes mellitus (Multi)    Transient visual loss of left eye    Annual physical exam    Diabetic hypoglycemia (Multi)    Eustachian tube dysfunction    Status post lobectomy of lung    Osteopenia of neck of femur    Primary insomnia    Medicare annual wellness visit, subsequent    Sebaceous cyst of labia    Closed head injury    Facial laceration    Fall    History of diabetes mellitus    History of herpes zoster    Malignant neuroendocrine neoplasm    Open fracture of nasal bone    Hospital discharge follow-up    Stress incontinence of urine    Gastroparesis due to DM (Multi)    Primary osteoarthritis, right shoulder    Complete rotator cuff tear or rupture of right shoulder, not specified as traumatic    Controlled type 2 diabetes mellitus without complication, without long-term current use of insulin    Preoperative clearance    Osteoarthritis of right shoulder, unspecified osteoarthritis type

## 2025-04-18 NOTE — DISCHARGE SUMMARY
Discharge summary  This patient Nan Crane was admitted to the hospital on 4/17/2025  after undergoing Procedure(s) (LRB):  ARTHROPLASTY TOTAL SHOULDER - REVERSE (Right) without complications that morning.    During the postoperative period,while in hospital, patient was medically managed by the hospitalist. Please see medial notes and H&P for patients additional diagnoses.  Ortho agrees with all medical diagnoses and treatments while patient in hospital.  No significant or unexpected findings or abnormal ortho imaging were noted during the hospital stay    Hospital course      Patient tolerated surgical procedure well and there was no complications. Patient progressed adequately through their recovery during hospital stay including PT and rehabilitation.    Patient was then D/C on  to home  in stable condition.  Patient was instructed on the use of pain medications, the signs and symptoms of infection, and was given our number to call should they have any questions or concerns following discharge.    Based on my clinical judgment, the patient was provided with a 7-day prescription for opioid medication at 30 MED, indicated for treatment of acute pain in the setting of recent surgery. OARRS report was run and has demonstrated an appropriate time course.  The patient has been provided with counseling pertaining to safe use of opioid medication.    No acute events during hospitalization. Stable for discharge to home.     Patient may NWB to operative extremity. Therapy per TSA handout  Aquacel dressing to be removed pod7 and incision left open to air    Follow up with surgeon in 2 weeks

## 2025-04-18 NOTE — PROGRESS NOTES
Orthopedic Surgery Progress Note  Nan RICHEY May  4/18/2025    Subjective:     Post-Operative Day # 1 Status Post right TSA /ORIF proximal humerus fracture    Systemic or Specific Complaints:No Complaints    Objective:     Visit Vitals  /55   Pulse 76   Temp 36.4 °C (97.5 °F)   Resp 16       Intake/Output Summary (Last 24 hours) at 4/18/2025 1001  Last data filed at 4/18/2025 0600  Gross per 24 hour   Intake 2782.5 ml   Output --   Net 2782.5 ml     DRAIN/TUBE OUTPUT:         General: alert and oriented, in no acute distress, appears stated age, and cooperative   Wound: no erythema, no edema, no drainage, and dressing CDI   Extremity: Sling on extremity.  Distal NVI   DVT Exam: No evidence of DVT seen on physical exam.     Data Review  Labs reviewed:     CBC:   Lab Results   Component Value Date    WBC 8.0 04/18/2025    WBC 6.5 04/03/2025    WBC 6.5 01/24/2025    HGB 10.2 (L) 04/18/2025    HGB 13.0 04/03/2025    HGB 12.6 01/24/2025       BMP:    Lab Results   Component Value Date     04/18/2025     04/03/2025     01/24/2025    K 3.9 04/18/2025    K 4.7 04/03/2025    K 4.3 01/24/2025     (H) 04/18/2025     04/03/2025     01/24/2025    CO2 20 (L) 04/18/2025    CO2 21 04/03/2025    CO2 26 01/24/2025    BUN 48 (H) 04/18/2025    BUN 38 (H) 04/03/2025    BUN 33 (H) 01/24/2025    CREATININE 1.45 (H) 04/18/2025    CREATININE 1.31 (H) 04/03/2025    CREATININE 1.20 (H) 01/24/2025          I&O  I/O last 3 completed shifts:  In: 2782.5 (40.3 mL/kg) [P.O.:850; I.V.:1732.5 (25.1 mL/kg); IV Piggyback:200]  Out: - (0 mL/kg)   Dosing Weight: 69 kg       Assessment:     Status Post right TSA /ORIF proximal humerus fracture, doing well     No acute events overnight.     Plan:      1:  Discharge today, Return to Clinic: 2 weeks    2:  Continue Pain Control  3:  Physical therapy as per TSA protocol  4.   Discharge planning: TBD. SW and TCC following.     Guille Lyons, MATTHIAS-CNP

## 2025-04-18 NOTE — HH CARE COORDINATION
Home Care received a Referral for Physical Therapy, Occupational Therapy, and Home Health Aide. We have processed the referral for a Start of Care on 04-19-25.     If you have any questions or concerns, please feel free to contact us at [phone]. Follow the prompts, enter your five digit zip code, and you will be directed to your care team on WEST 2.

## 2025-04-18 NOTE — NURSING NOTE
Discharge instructions given. Pt verbalizes understanding. No questions or concerns at this time.     Calcipotriene Counseling:  I discussed with the patient the risks of calcipotriene including but not limited to erythema, scaling, itching, and irritation.

## 2025-04-18 NOTE — PROGRESS NOTES
Occupational Therapy    Evaluation    Patient Name: Nan Crane  MRN: [id number]  Today's Date: 4/18/2025  Time Calculation  Start Time: 0900  Stop Time: 0940  Time Calculation (min): 40 min  607/607-A    Assessment  IP OT Assessment  OT Assessment: Pt is s/p R TSA with R shoulder precautions which limit her ADL status. Pt would benefit from skilled OT to address deficits and maximize IND.  Prognosis: Excellent  Barriers to Discharge Home: No anticipated barriers  Evaluation/Treatment Tolerance: Patient tolerated treatment well  Medical Staff Made Aware: Yes  End of Session Communication: Bedside nurse  End of Session Patient Position: Up in chair, Alarm on    Plan:  Treatment Interventions: ADL retraining, UE strengthening/ROM, Patient/family training  OT Frequency: 3 times per week  OT Recommended Transfer Status: Independent  OT - OK to Discharge: Yes (ok to d/c to next level of care once medically cleared)    Subjective     Current Problem:  1. Primary osteoarthritis, right shoulder        2. Complete tear of right rotator cuff, unspecified whether traumatic        3. Osteoarthritis of right shoulder, unspecified osteoarthritis type  cyclobenzaprine (Flexeril) 5 mg tablet    docusate sodium (Colace) 100 mg capsule    oxyCODONE (Roxicodone) 5 mg immediate release tablet          General:  General  Reason for Referral: s/p R TSA  Referred By: PT/OT 4/17 YUNG Gilbert  Past Medical History Relevant to Rehab: HLD, HTN, arthritis, depression, anxiety, CKD, DM, gout, vertigo, lung CA, L TSA, UZMA TKA, partial R lung removal  Family/Caregiver Present: No  Prior to Session Communication: Bedside nurse  Patient Position Received: Up in chair, Alarm on (RUE ultrasling in place)  General Comment: Pt is a 76 y/o F s/p elective R TSA by Dr. YUNG Gilbert on 4/17, h/o rotator cuff tear and DJD.    Precautions:  UE Weight Bearing Status: Right Non-Weight Bearing  Precautions Comment: Ultrasling RUE. No active movement of operative  shoulder. Patient to remain in sling at all times unless with therapy. Patient can come out of the sling for active range of motion of elbow, wrist, hand and fingers with therapy only. NWB to operative extremity. For anatomic total shoulder replacement, PROM and AAROM for external rotation not passed 30 degrees.    Pain:  Pain Assessment  Pain Assessment: 0-10  0-10 (Numeric) Pain Score: 0 - No pain    Objective     Cognition:  Overall Cognitive Status: Within Functional Limits  Orientation Level: Oriented X4             Home Living:  Home Living Comments: Pt lives alone (son will be staying with her upon d/c to assist) in a 1-level home with 1 DORINA. Laundry located in basement. Has a walk in shower, no equipment.     Prior Function:  Prior Function Comments: PTA, pt reports completing all ADLs/iADLs IND, ambulating IND without AD. Denies falls in the past 3 months, drives.    ADL:  Eating Assistance:  (S/U)  Grooming Assistance: Minimal  Bathing Assistance: Moderate  UE Dressing Assistance: Minimal (Pt donned pull over tank top and open shirt with MIN A. Cues for technique. Pt instructed in doffing/donning ultrasling, overall required MOD A but able to assist with all aspects. \)  LE Dressing Assistance: Minimal (Pt threaded underwear and pants while seated in chair, pulled over hips in unsupported stance. Required MIN A to pull pants over R hip.)  Toileting Assistance with Device: Stand by (Pt completed clothing management and hygiene in unsupported stance with SBA and increased time.)    Activity Tolerance:  Endurance: Endurance does not limit participation in activity    Bed Mobility/Transfers:   Bed Mobility  Bed Mobility: No  Transfers  Transfer:  (Sit to/from stand without device and ambulatory toilet transfer IND.)    Ambulation/Gait Training:  Functional Mobility  Functional Mobility Performed:  (Pt completed functional mobility throughout room and bathroom without AD IND.)    Sitting Balance:  Static  Sitting Balance  Static Sitting-Comment/Number of Minutes: Good  Dynamic Sitting Balance  Dynamic Sitting-Comments: Good    Standing Balance:  Static Standing Balance  Static Standing-Comment/Number of Minutes: Good  Dynamic Standing Balance  Dynamic Standing-Comments: Fair+    Strength:  Strength Comments: ROLANDO grossly WFL, slightly decreased  strength L hand. RUE NT d/t NWB    Hand Function:  Hand Function  Gross Grasp:  (Decreased strength L)    Extremities: RASHEEDA VANESSA :  (Elbow/wrist/hand WFL. Shoulder NT d/t ROM restrictions) and ROLANDO MARSHALL: Within Functional Limits    Outcome Measures: [facility] Daily Activity  Putting on and taking off regular lower body clothing: A little  Bathing (including washing, rinsing, drying): A lot  Putting on and taking off regular upper body clothing: A little  Toileting, which includes using toilet, bedpan or urinal: A little  Taking care of personal grooming such as brushing teeth: A little  Eating Meals: A little  Daily Activity - Total Score: 17                       EDUCATION:  Education  Individual(s) Educated: Patient  Education Provided: Fall precautons, Risk and benefits of OT discussed with patient or other, POC discussed and agreed upon  Patient Response to Education: Patient/Caregiver Verbalized Understanding of Information  Education Comment: Pt educated extensively on doffing/donning ultra sling, R shoulder precautions, and compensatory strategies during ADL tasks while maintaining R shoulder precautions. Pt also provided with shoulder packet to ensure carryover. Pt verbalized/demo good understanding of all education and would benefit from continued education to maximize IND.  Education Documentation  Body Mechanics, taught by Yessi Sal OT at 4/18/2025 10:11 AM.  Learner: Patient  Readiness: Acceptance  Method: Explanation, Demonstration  Response: Verbalizes Understanding, Demonstrated Understanding, Needs Reinforcement    Precautions, taught by Yessi Sal  OT at 4/18/2025 10:11 AM.  Learner: Patient  Readiness: Acceptance  Method: Explanation, Demonstration  Response: Verbalizes Understanding, Demonstrated Understanding, Needs Reinforcement    ADL Training, taught by Yessi Sal OT at 4/18/2025 10:11 AM.  Learner: Patient  Readiness: Acceptance  Method: Explanation, Demonstration  Response: Verbalizes Understanding, Demonstrated Understanding, Needs Reinforcement      Goals:   Encounter Problems       Encounter Problems (Active)       OT Goals       Pt will don/doff R ultra sling with MOD I. (Progressing)       Start:  04/18/25    Expected End:  05/02/25            Pt will complete UB dressing with MOD I. (Progressing)       Start:  04/18/25    Expected End:  05/02/25            Pt will complete LB dressing with MOD I. (Progressing)       Start:  04/18/25    Expected End:  05/02/25            Pt will verbalize and maintain all R shoulder precautions during ADL tasks. (Progressing)       Start:  04/18/25    Expected End:  05/02/25            Pt will complete 1x10 R elbow/wrist/hand AROM IND. (Progressing)       Start:  04/18/25    Expected End:  05/02/25

## 2025-04-18 NOTE — PROGRESS NOTES
04/18/25 1158   Discharge Planning   Home or Post Acute Services In home services   Type of Home Care Services Home health aide;Home OT;Home PT   Expected Discharge Disposition Home H  ([facility])   Does the patient need discharge transport arranged? No     Pt is s/p POD #1 Elective Right reverse total shoulder arthroplasty 4/17/25 with Dr. Pavan Gilbert. [facility] (17) recommending continued OT at low level/intensity. Pt states discharge preference is home but would prefer [facility] and agency of choice is [facility], son to stay with her to assist. Demographics verified, best phone # [phone]. [facility]  notified of HHC referral/HCO in Epic, awaiting confirmation of SOC.    Update: [facility] confirms HHC next day SOC 4/19/25.

## 2025-04-18 NOTE — DISCHARGE INSTRUCTIONS
Total Shoulder Replacement  Discharge Instructions    Surgical Site Care:    Change dressing once a day and PRN (as needed). Apply 4 x 4 sponge and light tape. If glue present, leave open to air.  You may leave wound open to air after initial dressing removal, if wound is clean, dry and intact  If Aquacel Ag dressing is present, do not remove dressing for 7 days, unless heavily saturated. If heavily saturated, remove dressing and start using instructions above  Staples will be removed on post-operative day 14 and steri-strips applied  Showering is permitted starting POD1 if waterproof Aquacel dressing is present or when the incision is covered with 4 x 4 and Tegaderm waterproof dressing  Until all areas of incision are healed.  Physical Therapy:  Weight Bearing Status:  NWB ( none weight bearing)  No ROM of Shoulder  Active and passive range of motion of elbow, wrist, hand  Per Physical Therapy handout  Sling to be applied at all times when out of bed. Ok to remove sling  for hygiene and when awake in bed with support  Pain Medications  You were given  oxycodone  Wean off pain medications as you deem appropriate as long as pain is under control  Contact [facility] if you notice any reactions to the medication or need a refill  Cold packs/Ice packs/Machine  May be used 3 times daily for 15-30 minutes as necessary  Be sure to have a barrier (cloth, clothing, towel) between the site and the ice pack to prevent frostbite  Contact the [facility] office if  Increased redness, swelling, drainage of any kind, and/or pain to surgery site.  As well as new onset fevers and or chills.  These could signify an infection.  Arm tenderness to touch as well as increased swelling or redness.  This could signify a clot formation.  Numbness or tingling to an area around the incision site or below the incision site (fingers).  Any rash appears, increased  or new onset nausea/vomiting occur.  This may  indicate a reaction to a medication.  Phone # [phone].  Follow up with Surgeon

## 2025-04-21 ENCOUNTER — HOME CARE VISIT (OUTPATIENT)
Dept: HOME HEALTH SERVICES | Facility: HOME HEALTH | Age: 75
End: 2025-04-21
Payer: COMMERCIAL

## 2025-04-21 VITALS
SYSTOLIC BLOOD PRESSURE: 112 MMHG | OXYGEN SATURATION: 98 % | RESPIRATION RATE: 14 BRPM | HEART RATE: 89 BPM | TEMPERATURE: 97.9 F | DIASTOLIC BLOOD PRESSURE: 70 MMHG

## 2025-04-21 PROCEDURE — G0151 HHCP-SERV OF PT,EA 15 MIN: HCPCS

## 2025-04-21 PROCEDURE — 0023 HH SOC

## 2025-04-21 ASSESSMENT — ENCOUNTER SYMPTOMS
PAIN LOCATION - PAIN QUALITY: SORENESS
PAIN: 1
PAIN LOCATION: RIGHT SHOULDER
PAIN LOCATION - PAIN SEVERITY: 0/10
PAIN LOCATION - PAIN QUALITY: SORENESS
PAIN LOCATION - EXACERBATING FACTORS: MOVEMENT
PERSON REPORTING PAIN: PATIENT
PAIN LOCATION: RIGHT HAND
PAIN LOCATION - PAIN SEVERITY: 8/10

## 2025-04-21 ASSESSMENT — ACTIVITIES OF DAILY LIVING (ADL)
ENTERING_EXITING_HOME: ONE PERSON
OASIS_M1830: 03

## 2025-04-22 ENCOUNTER — HOME CARE VISIT (OUTPATIENT)
Dept: HOME HEALTH SERVICES | Facility: HOME HEALTH | Age: 75
End: 2025-04-22
Payer: COMMERCIAL

## 2025-04-22 ENCOUNTER — TELEPHONE (OUTPATIENT)
Dept: ORTHOPEDIC SURGERY | Facility: CLINIC | Age: 75
End: 2025-04-22
Payer: COMMERCIAL

## 2025-04-22 PROCEDURE — G0152 HHCP-SERV OF OT,EA 15 MIN: HCPCS

## 2025-04-22 ASSESSMENT — ACTIVITIES OF DAILY LIVING (ADL)
DRESSING_UB_CURRENT_FUNCTION: MODERATE ASSIST
BATHING ASSESSED: 1
BATHING_CURRENT_FUNCTION: MINIMUM ASSIST
FEEDING_WITHIN_DEFINED_LIMITS: 1
TOILETING: 1
TOILETING: SUPERVISION
SPONGING_LB_CURRENT_FUNCTION: SUPERVISION
AMBULATION ASSISTANCE ON FLAT SURFACES: 1

## 2025-04-22 ASSESSMENT — ENCOUNTER SYMPTOMS
PAIN: 1
OCCASIONAL FEELINGS OF UNSTEADINESS: 0
DEPRESSION: 0
HIGHEST PAIN SEVERITY IN PAST 24 HOURS: 8/10
SUBJECTIVE PAIN PROGRESSION: UNCHANGED
PAIN LOCATION: RIGHT SHOULDER
LOWEST PAIN SEVERITY IN PAST 24 HOURS: 5/10
PERSON REPORTING PAIN: PATIENT
PAIN LOCATION - PAIN SEVERITY: 5/10
LOSS OF SENSATION IN FEET: 0

## 2025-04-22 NOTE — TELEPHONE ENCOUNTER
Patient called asking when they can take off stockings after Sx, Rt TSR rev 4/17/25. I expressed to patient 2 weeks after which it would still fall on the day of her appointment 5/1/2025.  Patient understood.

## 2025-04-24 ENCOUNTER — HOME CARE VISIT (OUTPATIENT)
Dept: HOME HEALTH SERVICES | Facility: HOME HEALTH | Age: 75
End: 2025-04-24
Payer: COMMERCIAL

## 2025-04-24 PROCEDURE — G0157 HHC PT ASSISTANT EA 15: HCPCS | Mod: CQ

## 2025-04-24 ASSESSMENT — ENCOUNTER SYMPTOMS
PAIN SEVERITY GOAL: 0/10
HIGHEST PAIN SEVERITY IN PAST 24 HOURS: 7/10
PERSON REPORTING PAIN: PATIENT
PAIN LOCATION: RIGHT SHOULDER
PAIN: 1
LOWEST PAIN SEVERITY IN PAST 24 HOURS: 0/10
SUBJECTIVE PAIN PROGRESSION: WAXING AND WANING

## 2025-04-28 ENCOUNTER — HOME CARE VISIT (OUTPATIENT)
Dept: HOME HEALTH SERVICES | Facility: HOME HEALTH | Age: 75
End: 2025-04-28
Payer: COMMERCIAL

## 2025-04-28 PROCEDURE — G0157 HHC PT ASSISTANT EA 15: HCPCS | Mod: CQ

## 2025-04-28 ASSESSMENT — ENCOUNTER SYMPTOMS
PAIN LOCATION: RIGHT SHOULDER
PAIN SEVERITY GOAL: 0/10
PERSON REPORTING PAIN: PATIENT
PAIN: 1
LOWEST PAIN SEVERITY IN PAST 24 HOURS: 2/10
HIGHEST PAIN SEVERITY IN PAST 24 HOURS: 5/10

## 2025-04-29 ENCOUNTER — HOME CARE VISIT (OUTPATIENT)
Dept: HOME HEALTH SERVICES | Facility: HOME HEALTH | Age: 75
End: 2025-04-29
Payer: COMMERCIAL

## 2025-04-29 VITALS — OXYGEN SATURATION: 98 %

## 2025-04-29 PROCEDURE — G0152 HHCP-SERV OF OT,EA 15 MIN: HCPCS

## 2025-04-29 ASSESSMENT — ENCOUNTER SYMPTOMS
SUBJECTIVE PAIN PROGRESSION: GRADUALLY IMPROVING
PAIN: 1
PERSON REPORTING PAIN: PATIENT
PAIN LOCATION: RIGHT ARM
LOWEST PAIN SEVERITY IN PAST 24 HOURS: 3/10
HIGHEST PAIN SEVERITY IN PAST 24 HOURS: 4/10
PAIN LOCATION - PAIN QUALITY: PULLING
PAIN LOCATION - PAIN SEVERITY: 3/10
PAIN LOCATION - RELIEVING FACTORS: ICE

## 2025-04-29 ASSESSMENT — ACTIVITIES OF DAILY LIVING (ADL)
DRESSING_LB_CURRENT_FUNCTION: MINIMUM ASSIST
TOILETING: 1
TOILETING: INDEPENDENT
DRESSING_UB_CURRENT_FUNCTION: STAND BY ASSIST

## 2025-05-01 ENCOUNTER — OFFICE VISIT (OUTPATIENT)
Dept: ORTHOPEDIC SURGERY | Facility: CLINIC | Age: 75
End: 2025-05-01
Payer: COMMERCIAL

## 2025-05-01 ENCOUNTER — HOSPITAL ENCOUNTER (OUTPATIENT)
Dept: RADIOLOGY | Facility: CLINIC | Age: 75
Discharge: HOME | End: 2025-05-01
Payer: COMMERCIAL

## 2025-05-01 DIAGNOSIS — M25.511 RIGHT SHOULDER PAIN, UNSPECIFIED CHRONICITY: ICD-10-CM

## 2025-05-01 PROCEDURE — 99212 OFFICE O/P EST SF 10 MIN: CPT | Performed by: PHYSICIAN ASSISTANT

## 2025-05-01 PROCEDURE — 99024 POSTOP FOLLOW-UP VISIT: CPT | Performed by: PHYSICIAN ASSISTANT

## 2025-05-01 PROCEDURE — 3062F POS MACROALBUMINURIA REV: CPT | Performed by: PHYSICIAN ASSISTANT

## 2025-05-01 PROCEDURE — 3051F HG A1C>EQUAL 7.0%<8.0%: CPT | Performed by: PHYSICIAN ASSISTANT

## 2025-05-01 PROCEDURE — 1157F ADVNC CARE PLAN IN RCRD: CPT | Performed by: PHYSICIAN ASSISTANT

## 2025-05-01 PROCEDURE — 1123F ACP DISCUSS/DSCN MKR DOCD: CPT | Performed by: PHYSICIAN ASSISTANT

## 2025-05-01 PROCEDURE — 4010F ACE/ARB THERAPY RXD/TAKEN: CPT | Performed by: PHYSICIAN ASSISTANT

## 2025-05-01 PROCEDURE — 3048F LDL-C <100 MG/DL: CPT | Performed by: PHYSICIAN ASSISTANT

## 2025-05-01 PROCEDURE — 73030 X-RAY EXAM OF SHOULDER: CPT | Mod: RT

## 2025-05-01 NOTE — PROGRESS NOTES
History of present illness patient is status post right reverse total shoulder arthroplasty.  Patient presents in sling.  Some soreness but no severe pain.      Physical exam:      General: No acute distress or breathing difficulty or discomfort, pleasant and cooperative with the examination.    Extremities: Right shoulder incisions well-healing and intact.  She is neurovascularly intact hand wrist and elbow       Diagnostic studies: X-rays 2 views right shoulder show well aligned well-positioned right reverse total shoulder arthroplasty there read by Dr. Pavan Gilbert    Impression: Status post right reverse total shoulder arthroplasty    Plan: Sling was downsized and readjusted.  She will continue to wear when she is up and about but does not need it for eating, sleeping, sitting or bathing patient was instructed on gentle supine stretches which she may perform once daily.  No weightbearing right upper extremity.  Follow-up in 3 weeks with x-rays 2 views of the right shoulder and range of motion check.

## 2025-05-02 ENCOUNTER — HOME CARE VISIT (OUTPATIENT)
Dept: HOME HEALTH SERVICES | Facility: HOME HEALTH | Age: 75
End: 2025-05-02
Payer: COMMERCIAL

## 2025-05-02 VITALS — SYSTOLIC BLOOD PRESSURE: 121 MMHG | OXYGEN SATURATION: 97 % | HEART RATE: 84 BPM | DIASTOLIC BLOOD PRESSURE: 72 MMHG

## 2025-05-02 PROCEDURE — G0152 HHCP-SERV OF OT,EA 15 MIN: HCPCS

## 2025-05-02 PROCEDURE — G0151 HHCP-SERV OF PT,EA 15 MIN: HCPCS

## 2025-05-02 ASSESSMENT — ENCOUNTER SYMPTOMS
PAIN LOCATION - RELIEVING FACTORS: ICE, TYLENOL
PAIN LOCATION: RIGHT SHOULDER
PAIN: 1
PERSON REPORTING PAIN: PATIENT
PAIN LOCATION - PAIN QUALITY: THROB
HIGHEST PAIN SEVERITY IN PAST 24 HOURS: 8/10
LOWEST PAIN SEVERITY IN PAST 24 HOURS: 3/10
PAIN LOCATION: RIGHT SHOULDER
PAIN LOCATION - PAIN SEVERITY: 5/10
PAIN LOCATION - EXACERBATING FACTORS: MOVEMENT OF SHOULDER
PAIN: 1
PAIN LOCATION - PAIN SEVERITY: 6/10
PERSON REPORTING PAIN: PATIENT
PAIN LOCATION - PAIN QUALITY: SORE

## 2025-05-02 ASSESSMENT — ACTIVITIES OF DAILY LIVING (ADL): DRESSING_UB_CURRENT_FUNCTION: SUPERVISION

## 2025-05-06 ENCOUNTER — HOME CARE VISIT (OUTPATIENT)
Dept: HOME HEALTH SERVICES | Facility: HOME HEALTH | Age: 75
End: 2025-05-06
Payer: COMMERCIAL

## 2025-05-06 PROCEDURE — G0152 HHCP-SERV OF OT,EA 15 MIN: HCPCS

## 2025-05-06 ASSESSMENT — ENCOUNTER SYMPTOMS
PAIN LOCATION - PAIN SEVERITY: 3/10
PAIN LOCATION - EXACERBATING FACTORS: HEP
PAIN LOCATION - PAIN FREQUENCY: FREQUENT
PAIN LOCATION: RIGHT SHOULDER
PERSON REPORTING PAIN: PATIENT
PAIN: 1

## 2025-05-06 ASSESSMENT — ACTIVITIES OF DAILY LIVING (ADL): DRESSING_UB_CURRENT_FUNCTION: INDEPENDENT

## 2025-05-08 ENCOUNTER — HOME CARE VISIT (OUTPATIENT)
Dept: HOME HEALTH SERVICES | Facility: HOME HEALTH | Age: 75
End: 2025-05-08
Payer: COMMERCIAL

## 2025-05-08 VITALS — OXYGEN SATURATION: 98 % | TEMPERATURE: 98.3 F

## 2025-05-08 PROCEDURE — G0152 HHCP-SERV OF OT,EA 15 MIN: HCPCS

## 2025-05-08 ASSESSMENT — ACTIVITIES OF DAILY LIVING (ADL)
GROOMING_WITHIN_DEFINED_LIMITS: 1
DRESSING_UB_CURRENT_FUNCTION: INDEPENDENT
BATHING_CURRENT_FUNCTION: INDEPENDENT
OASIS_M1830: 01
BATHING_WITHIN_DEFINED_LIMITS: 1
TOILETING: 1
BATHING ASSESSED: 1
HOME_HEALTH_OASIS: 00
FEEDING_WITHIN_DEFINED_LIMITS: 1
TOILETING: INDEPENDENT
PREPARING MEALS: INDEPENDENT

## 2025-05-08 ASSESSMENT — ENCOUNTER SYMPTOMS
DEPRESSION: 0
LOSS OF SENSATION IN FEET: 0
SUBJECTIVE PAIN PROGRESSION: GRADUALLY IMPROVING
PAIN LOCATION: RIGHT SHOULDER
HIGHEST PAIN SEVERITY IN PAST 24 HOURS: 4/10
PAIN LOCATION - PAIN SEVERITY: 2/10
PAIN: 1
PERSON REPORTING PAIN: PATIENT
OCCASIONAL FEELINGS OF UNSTEADINESS: 0
LOWEST PAIN SEVERITY IN PAST 24 HOURS: 0/10

## 2025-05-14 ENCOUNTER — APPOINTMENT (OUTPATIENT)
Dept: PHYSICAL THERAPY | Facility: CLINIC | Age: 75
End: 2025-05-14
Payer: COMMERCIAL

## 2025-05-20 ENCOUNTER — APPOINTMENT (OUTPATIENT)
Dept: PHYSICAL THERAPY | Facility: CLINIC | Age: 75
End: 2025-05-20
Payer: COMMERCIAL

## 2025-05-21 ENCOUNTER — OFFICE VISIT (OUTPATIENT)
Dept: ORTHOPEDIC SURGERY | Facility: CLINIC | Age: 75
End: 2025-05-21
Payer: COMMERCIAL

## 2025-05-21 ENCOUNTER — HOSPITAL ENCOUNTER (OUTPATIENT)
Dept: RADIOLOGY | Facility: CLINIC | Age: 75
Discharge: HOME | End: 2025-05-21
Payer: COMMERCIAL

## 2025-05-21 DIAGNOSIS — M25.511 RIGHT SHOULDER PAIN, UNSPECIFIED CHRONICITY: Primary | ICD-10-CM

## 2025-05-21 DIAGNOSIS — M25.511 RIGHT SHOULDER PAIN, UNSPECIFIED CHRONICITY: ICD-10-CM

## 2025-05-21 DIAGNOSIS — M19.011 PRIMARY OSTEOARTHRITIS OF RIGHT SHOULDER: ICD-10-CM

## 2025-05-21 DIAGNOSIS — Z96.611 STATUS POST TOTAL SHOULDER REPLACEMENT, RIGHT: ICD-10-CM

## 2025-05-21 PROCEDURE — 4010F ACE/ARB THERAPY RXD/TAKEN: CPT | Performed by: PHYSICIAN ASSISTANT

## 2025-05-21 PROCEDURE — 3048F LDL-C <100 MG/DL: CPT | Performed by: PHYSICIAN ASSISTANT

## 2025-05-21 PROCEDURE — 3051F HG A1C>EQUAL 7.0%<8.0%: CPT | Performed by: PHYSICIAN ASSISTANT

## 2025-05-21 PROCEDURE — 73030 X-RAY EXAM OF SHOULDER: CPT | Mod: RIGHT SIDE | Performed by: ORTHOPAEDIC SURGERY

## 2025-05-21 PROCEDURE — 3062F POS MACROALBUMINURIA REV: CPT | Performed by: PHYSICIAN ASSISTANT

## 2025-05-21 PROCEDURE — 99212 OFFICE O/P EST SF 10 MIN: CPT | Performed by: PHYSICIAN ASSISTANT

## 2025-05-21 PROCEDURE — 99024 POSTOP FOLLOW-UP VISIT: CPT | Performed by: PHYSICIAN ASSISTANT

## 2025-05-21 PROCEDURE — 73030 X-RAY EXAM OF SHOULDER: CPT | Mod: RT

## 2025-05-21 RX ORDER — HYDROXYZINE HYDROCHLORIDE 10 MG/1
10 TABLET, FILM COATED ORAL 3 TIMES DAILY
Qty: 30 TABLET | Refills: 0 | Status: SHIPPED | OUTPATIENT
Start: 2025-05-21 | End: 2025-05-31

## 2025-05-21 RX ORDER — METHYLPREDNISOLONE 4 MG/1
TABLET ORAL
Qty: 21 TABLET | Refills: 0 | Status: SHIPPED | OUTPATIENT
Start: 2025-05-21

## 2025-05-21 RX ORDER — METFORMIN HYDROCHLORIDE 1000 MG/1
TABLET ORAL
COMMUNITY
Start: 2025-05-20

## 2025-05-21 NOTE — PROGRESS NOTES
History of present illness patient is 5 weeks status post right reverse total shoulder arthroplasty.  No complaints of pain.  She does state that she developed a slight rash on her upper torso and especially around the incisional area that became pruritic as well.      Physical exam:      General: No acute distress or breathing difficulty or discomfort, pleasant and cooperative with the examination.    Extremities: Right shoulder incisions clean dry and intact top 3 cm of the incision have slight raised rash there is no drainage or evidence of infection      Diagnostic studies: X-rays 2 views right shoulder show well aligned well-positioned right reverse total shoulder arthroplasty there read by Dr. Pavan Gilbert    Impression: Status post right reverse total shoulder arthroplasty    Plan:    Place patient on Medrol dose pack and Atarax to help with the pruritus.  Patient no longer needs to wear the sling she will continue to work on range of motion.  If the rash has not completely cleared by next week she will come in for reexamination of that it otherwise she will follow-up in 4 weeks with x-rays 2 views right shoulder and range of motion check.

## 2025-05-22 ENCOUNTER — APPOINTMENT (OUTPATIENT)
Dept: PRIMARY CARE | Facility: CLINIC | Age: 75
End: 2025-05-22
Payer: COMMERCIAL

## 2025-05-22 ENCOUNTER — APPOINTMENT (OUTPATIENT)
Dept: PHYSICAL THERAPY | Facility: CLINIC | Age: 75
End: 2025-05-22
Payer: COMMERCIAL

## 2025-05-22 VITALS
RESPIRATION RATE: 16 BRPM | TEMPERATURE: 97.3 F | HEIGHT: 62 IN | OXYGEN SATURATION: 97 % | WEIGHT: 159.6 LBS | BODY MASS INDEX: 29.37 KG/M2 | SYSTOLIC BLOOD PRESSURE: 120 MMHG | DIASTOLIC BLOOD PRESSURE: 60 MMHG | HEART RATE: 87 BPM

## 2025-05-22 DIAGNOSIS — L50.9 URTICARIA: ICD-10-CM

## 2025-05-22 DIAGNOSIS — N18.30 STAGE 3 CHRONIC KIDNEY DISEASE, UNSPECIFIED WHETHER STAGE 3A OR 3B CKD (MULTI): ICD-10-CM

## 2025-05-22 DIAGNOSIS — N18.32 TYPE 2 DIABETES MELLITUS WITH STAGE 3B CHRONIC KIDNEY DISEASE, WITHOUT LONG-TERM CURRENT USE OF INSULIN (MULTI): ICD-10-CM

## 2025-05-22 DIAGNOSIS — Z98.890 HISTORY OF SHOULDER SURGERY: ICD-10-CM

## 2025-05-22 DIAGNOSIS — E11.22 TYPE 2 DIABETES MELLITUS WITH STAGE 3B CHRONIC KIDNEY DISEASE, WITHOUT LONG-TERM CURRENT USE OF INSULIN (MULTI): ICD-10-CM

## 2025-05-22 DIAGNOSIS — E11.9 TYPE 2 DIABETES MELLITUS WITHOUT COMPLICATION, WITHOUT LONG-TERM CURRENT USE OF INSULIN: ICD-10-CM

## 2025-05-22 DIAGNOSIS — I10 PRIMARY HYPERTENSION: Primary | ICD-10-CM

## 2025-05-22 PROCEDURE — 99214 OFFICE O/P EST MOD 30 MIN: CPT | Performed by: INTERNAL MEDICINE

## 2025-05-22 PROCEDURE — 1159F MED LIST DOCD IN RCRD: CPT | Performed by: INTERNAL MEDICINE

## 2025-05-22 PROCEDURE — 3078F DIAST BP <80 MM HG: CPT | Performed by: INTERNAL MEDICINE

## 2025-05-22 PROCEDURE — 3062F POS MACROALBUMINURIA REV: CPT | Performed by: INTERNAL MEDICINE

## 2025-05-22 PROCEDURE — 1160F RVW MEDS BY RX/DR IN RCRD: CPT | Performed by: INTERNAL MEDICINE

## 2025-05-22 PROCEDURE — 3008F BODY MASS INDEX DOCD: CPT | Performed by: INTERNAL MEDICINE

## 2025-05-22 PROCEDURE — 3051F HG A1C>EQUAL 7.0%<8.0%: CPT | Performed by: INTERNAL MEDICINE

## 2025-05-22 PROCEDURE — 3048F LDL-C <100 MG/DL: CPT | Performed by: INTERNAL MEDICINE

## 2025-05-22 PROCEDURE — 3074F SYST BP LT 130 MM HG: CPT | Performed by: INTERNAL MEDICINE

## 2025-05-22 PROCEDURE — 4010F ACE/ARB THERAPY RXD/TAKEN: CPT | Performed by: INTERNAL MEDICINE

## 2025-05-22 PROCEDURE — 1036F TOBACCO NON-USER: CPT | Performed by: INTERNAL MEDICINE

## 2025-05-22 RX ORDER — BLOOD-GLUCOSE,RECEIVER,CONT
EACH MISCELLANEOUS
COMMUNITY
Start: 2025-05-20

## 2025-05-22 RX ORDER — DULAGLUTIDE 0.75 MG/.5ML
INJECTION, SOLUTION SUBCUTANEOUS
COMMUNITY
Start: 2025-05-21

## 2025-05-22 NOTE — PROGRESS NOTES
Subjective   Nan Crane is a 75 y.o. female who presents for Diabetes (Follow up  diabetes , HTN ).  Patient here  today  follow  up DM,last HGBA1C was 7.7 on 4.3.2025   Checking BG at   home daily    Seeing dr Starr  for DM,  she   changed  some  meds  , taking jardiance , increased metformin to 1000 bid and is back on trulicity 0.75, just seen yesterday  Patient  had the   R  shoulder surgery  done 4.17.2025 - dr Gilbert , developed hives after sx, is on methylprednisolone, and hydroxyzine  per sx  Patient states she   has the  eye exam  every December    Follow up  HTN- checking BP at  home  117s/70s, no headaches,no  dizziness    Patient states she  had allergic reaction after the  R shouder surgery and the dr told her is from the  surgery but did not  specify exactly what was the cause, did see him yesterday  and gave  her some TX  for the hives    Numbness L hand  index and  middle  fingers  only the  tips  of the  fingers x 2 weeks       This is a diabetes follow up visit for Nan Crane. The current treatment includes oral medications and she is compliant all of the time. Symptoms include none. Glucose is monitored: once daily, and average sugars are 150 - 200. she reports good compliance with a low carbohydrate diet, and does  exercise.    Patient is prescribed an ACE/ARB/ARNI medication   Patient is prescribed a STATIN medication  Patient is prescribed a SGLT2/GLP1 medication    Last Flu Vaccine given:            10/17/2024   Last PCV Vaccine given:   10/09/2019    Lab Results   Component Value Date    HGBA1C 7.7 (H) 04/03/2025    CREATININE 1.45 (H) 04/18/2025    MICROALBCREA  04/03/2025      Comment:      One or more analytes used in this calculation is outside of the analytical measurement range. Calculation cannot be performed.    LDLCALC 83 04/03/2025        Last Eye Exam: patient reports annual screening by optometry/opthalmology     Liver Screening: FIB-4 Calculation: 2.08 at 4/18/2025  4:21  AM  Calculated from:  SGOT/AST: 20 U/L at 4/3/2025 11:24 AM  SGPT/ALT: 12 U/L at 4/3/2025 11:24 AM  Platelets: 205 x10*3/uL at 4/18/2025  4:21 AM  Age: 75 years    Interpretation: <1.45 Cirrhosis less likely, 1.45 - 3.25 Indeterminate, >3.25 Cirrhosis more likely    Review of Systems  Visit Vitals  /60 (BP Location: Left arm, Patient Position: Sitting)   Pulse 87   Temp 36.3 °C (97.3 °F)   Resp 16   Body mass index is 29.19 kg/m².   Objective   Physical Exam        Assessment & Plan  Type 2 diabetes mellitus without complication, without long-term current use of insulin  Hba1c 7.7 , has fu with dr rondon         Primary hypertension         Type 2 diabetes mellitus with stage 3b chronic kidney disease, without long-term current use of insulin (Multi)  Hba1c 7.7, seen by dr rondon, med changed as per med list         Stage 3 chronic kidney disease, unspecified whether stage 3a or 3b CKD (Multi)         Urticaria         History of shoulder surgery                   Irvin Muñoz MD 05/22/25 1:28 PM

## 2025-05-22 NOTE — PATIENT INSTRUCTIONS
Was nice seeing you today.  Continue same medication.  Have lab work done before next appointment if labs were ordered today.  Fu in 4 month.  Call/ contact our office with any concerns.    If you have labs or test done and you can't see the report in your chart or you didn't hear from us in 2 weeks after test/labs done , please, call our office for reports.  Please , do not assume that they were normal.    Any test results  and questions you might have , will be discussed at next visit -- please make sure to make a follow up appt after testing if reports are abnormal or you have questions.

## 2025-05-27 ENCOUNTER — APPOINTMENT (OUTPATIENT)
Dept: PHYSICAL THERAPY | Facility: CLINIC | Age: 75
End: 2025-05-27
Payer: COMMERCIAL

## 2025-05-27 NOTE — PROGRESS NOTES
Clinical Pharmacy Appointment    Patient ID: Nan Crane is a 75 y.o. female who presents for Type 2 Diabetes Mellitus.    Pt is here for First appointment.   Referring Provider: Irvin Muñoz MD  PCP: Irvin Muñoz MD, last visit: 5/22/25, next visit: 9/24/25    Subjective   HPI  PMH significant for T2DM, CKD, GERD, HTN, HLD, TIA, gastroparesis.  Special needs/barriers to therapy: None identified    Medication System Management  Patients preferred pharmacy: [facility]  Adherence/Organization: No current concerns  Affordability/Accessibility: No current concerns    Drug Interactions  No relevant drug interactions were noted.    DIABETES MELLITUS Type 2:    Known diabetic complications: chronic kidney disease.  Hx or FH Hx of MTC/MEN2?: No   Pancreatitis?: No   Gastroparesis?: yes  UTI/Yeast Infections?: Yes, reported yeast infections once a year    Follows with Endocrinology?: Yes, Dr. Starr [facility], next appt 8/5/25   Diabetic Eye Exam: Up to date, completed 12/2024  Monofilament Foot Exam: Needs completed, last unknown     Current diabetic medications include:  Jardiance 25mg daily  Metformin 1000mg BID  Trulicity 0.75mg once weekly (Wednesdays)  Previous medications: Glipizide (hypoglycemia)    Clarifications to above regimen: none  Adverse Effects: none    Glucose Readings:  Glucometer/CGM Type: FreeStyle Jessica 2    Previous home BG readings: none  Current home BG readings:   Review History --> Average Glucose (last option) --> Use arrows on bottom to change # of days  Review History --> Scroll to next page --> Time in Target (2nd option) Use arrows on bottom to change # of days  Average Glucose   7-day 14-day 30-day 90-day   Average 287 239 214 194     Time in Target   7-day 14-day 30-day 90-day   Above (>180) 94% 73% 64% 53%   In Target () 6% 27% 36% 47%   Below (<70) 0% 0% 0% 0%     Any episodes of hypoglycemia? No   Did patient treat episode of hypoglycemia  appropriately? N/A  Does the patient have a prescription for ready-to-use Glucagon? N/A    Lifestyle:  Diet: 3 meals/day.   BK: 3 eggs and cheese, butter toast, coffee with sugar free creamer  LN: crackers, ham salad, cantaloupe  DN: meat, starch, apple sauce  Snacks: crackers, potato chips  Drinks: water, zero sugar flavored water    Physical Activity: shoulder exercises daily (PT), stationary bike 3-4x per week x30 min     Risk Reducing Medications:  Statin? Yes, Rosuvastatin 10mg daily  ACE-I/ARB? Yes, Losartan 50mg daily    Preventative Care  Immunizations Needed: All up-to-date and documented  Tobacco Use: non-smoker    Objective   Allergies[1]  Social History     Social History Narrative    Not on file      Medication Review  Current Outpatient Medications   Medication Instructions    acetaminophen (TYLENOL 8 HOUR) 650 mg, Nightly PRN    acetaminophen (TYLENOL EXTRA STRENGTH) 1,000 mg, Every 8 hours PRN    aspirin 81 mg, oral, Nightly    blood sugar diagnostic (Accu-Chek Guide test strips) strip 1 strip, Daily    blood-glucose meter misc Check your blood sugar 3 to 5 times daily.    chlorthalidone (HYGROTON) 25 mg, oral, Daily    cholecalciferol (Vitamin D-3) 50 mcg (2,000 unit) capsule 1 capsule, Daily    coenzyme Q-10 100 mg, Daily    empagliflozin (JARDIANCE) 25 mg, oral, Daily    fluticasone (Flonase) 50 mcg/actuation nasal spray 1 spray, Each Nostril, As needed    FreeStyle Jessica 3 [place] misc     hydrOXYzine HCL (ATARAX) 10 mg, oral, 3 times daily    lancets misc Accu-Chek FastClix Lancets; Use one daily    loratadine (CLARITIN ORAL) 1 tablet, Daily PRN    losartan (COZAAR) 50 mg, oral, Daily    metFORMIN (Glucophage) 1,000 mg tablet     methylPREDNISolone (Medrol Dospak) 4 mg tablets Use as directed by package instructions    naloxone (Narcan) 4 mg/0.1 mL nasal spray Instill 1 spray intranasally for opioid overdose; repeat in 5 minutes if no response.    omeprazole (PRILOSEC) 20 mg, oral, Daily     polyethylene glycol (GLYCOLAX, MIRALAX) 17 g, Daily PRN    rosuvastatin (CRESTOR) 10 mg, oral, Nightly    spironolactone (ALDACTONE) 25 mg, oral, Daily    Trulicity 0.75 mg/0.5 mL pen injector       Vitals  BP Readings from Last 2 Encounters:   05/22/25 120/60   05/02/25 121/72     Labs  A1C  Lab Results   Component Value Date    HGBA1C 7.7 (H) 04/03/2025    HGBA1C 7.4 (A) 02/20/2025    HGBA1C 6.3 10/16/2024     BMP  Lab Results   Component Value Date    CALCIUM 9.4 04/18/2025     04/18/2025    K 3.9 04/18/2025    CO2 20 (L) 04/18/2025     (H) 04/18/2025    BUN 48 (H) 04/18/2025    CREATININE 1.45 (H) 04/18/2025    EGFR 38 (L) 04/18/2025     LFTs  Lab Results   Component Value Date    ALT 12 04/03/2025    AST 20 04/03/2025    ALKPHOS 73 04/03/2025    BILITOT 0.6 04/03/2025     FLP  Lab Results   Component Value Date    TRIG 110 04/03/2025    CHOL 169 04/03/2025    LDLF 65 11/10/2022    LDLCALC 83 04/03/2025    HDL 64.1 04/03/2025     Urine Microalbumin  Lab Results   Component Value Date    MICROALBCREA  04/03/2025      Comment:      One or more analytes used in this calculation is outside of the analytical measurement range. Calculation cannot be performed.     Weight Management  Wt Readings from Last 3 Encounters:   05/22/25 72.4 kg (159 lb 9.6 oz)   04/17/25 69 kg (152 lb 1.9 oz)   04/04/25 70.7 kg (155 lb 12.8 oz)      There is no height or weight on file to calculate BMI.     Assessment/Plan     DIABETES MELLITUS:   Patient's A1c is 7.7% on 4/3/25. Goal A1c <7.5% reasonable due to age and comorbidities.  Patient's SMBGs are above goal, 7 day avg .     Rationale for plan: Patient's BG are above goal, however she just finished Medrol Dosepak (completed yesterday). Discussed with patient that BG elevations are expected while on steroid courses. She also reported stress following surgery in April, which can also raise BG levels. Trulicity was recently started, she has gotten one dose so far.  Current dose of metformin (1000mg BID) is not appropriate based on most recent eGFR of 38 mL/min/1.73m2 (4/18/25). Maximum dose of metformin with current kidney function is 500mg BID. Plan to decrease metformin to 500mg BID. She just picked a new prescription of 1000mg BID, so counseled patient to cut tablets in 1/2 to be 500mg BID. Follow up in 2 weeks to assess BG after metformin adjustment and after being off steroid taper. In the future, will assess ability to increase Trulicity.     Medication Changes:  Decrease: Metformin from 1000mg BID to 500mg BID     Patient Education:  Counseled patient on relevant medication mechanisms of action, expectations, side effects, duration of therapy, contraindications, administration, and monitoring parameters.  All questions and concerns addressed. Contact pharmacist with any further questions or concerns prior to next appointment.  Reviewed CGM BG goals: Target range , Time in Range Goal >70%, GMI goal <7.5% reasonable due to age and comorbidities%    Clinical Pharmacist follow-up: 6/11/25 12:20PM, Telehealth visit    Thank You,  Dunia Aguilar, PharmD  PGY-1 Pharmacy Resident    Continue all meds under the continuation of care with the referring provider and clinical pharmacy team.  Verbal consent to manage patient's drug therapy was obtained from the patient. They were informed they may decline to participate or withdraw from participation in pharmacy services at any time.         [1]   Allergies  Allergen Reactions    Albuterol Other     Sleeplessness    Azatadine Other    Buspirone Hives    Codeine Other     Vomiting     Other Other and Rash    Quinapril Cough    Pollen Extracts Itching     Runny nose, cough    Trinalin Unknown    Amoxicillin Rash    Azatadine-Pseudoephedrine Rash    Doxycycline Rash    Doxycycline Calcium Rash    Erythromycin Rash    Sulfa (Sulfonamide Antibiotics) Rash    Sulfamethoxazole-Trimethoprim Rash

## 2025-05-28 ENCOUNTER — APPOINTMENT (OUTPATIENT)
Dept: PHARMACY | Facility: HOSPITAL | Age: 75
End: 2025-05-28
Payer: COMMERCIAL

## 2025-05-28 DIAGNOSIS — E11.9 CONTROLLED TYPE 2 DIABETES MELLITUS WITHOUT COMPLICATION, WITHOUT LONG-TERM CURRENT USE OF INSULIN: ICD-10-CM

## 2025-05-29 ENCOUNTER — APPOINTMENT (OUTPATIENT)
Dept: PHYSICAL THERAPY | Facility: CLINIC | Age: 75
End: 2025-05-29
Payer: COMMERCIAL

## 2025-06-06 ENCOUNTER — TELEPHONE (OUTPATIENT)
Dept: ORTHOPEDIC SURGERY | Facility: CLINIC | Age: 75
End: 2025-06-06
Payer: COMMERCIAL

## 2025-06-06 NOTE — TELEPHONE ENCOUNTER
I called the patient. Her incision is still itchy and she has hives.  Patient tried the MDP as prescribed. And was doing Benedryl.  I offered her to come in Monday to have it looked at. Patient was ok with this and she was added on to Monday

## 2025-06-09 ENCOUNTER — OFFICE VISIT (OUTPATIENT)
Dept: ORTHOPEDIC SURGERY | Facility: CLINIC | Age: 75
End: 2025-06-09
Payer: COMMERCIAL

## 2025-06-09 ENCOUNTER — HOSPITAL ENCOUNTER (OUTPATIENT)
Dept: RADIOLOGY | Facility: CLINIC | Age: 75
Discharge: HOME | End: 2025-06-09
Payer: COMMERCIAL

## 2025-06-09 DIAGNOSIS — M19.011 PRIMARY OSTEOARTHRITIS OF RIGHT SHOULDER: ICD-10-CM

## 2025-06-09 DIAGNOSIS — Z96.611 STATUS POST TOTAL SHOULDER REPLACEMENT, RIGHT: ICD-10-CM

## 2025-06-09 PROCEDURE — 99212 OFFICE O/P EST SF 10 MIN: CPT | Performed by: ORTHOPAEDIC SURGERY

## 2025-06-09 PROCEDURE — 73030 X-RAY EXAM OF SHOULDER: CPT | Mod: RT

## 2025-06-09 PROCEDURE — 3051F HG A1C>EQUAL 7.0%<8.0%: CPT | Performed by: ORTHOPAEDIC SURGERY

## 2025-06-09 PROCEDURE — 99024 POSTOP FOLLOW-UP VISIT: CPT | Performed by: ORTHOPAEDIC SURGERY

## 2025-06-09 PROCEDURE — 4010F ACE/ARB THERAPY RXD/TAKEN: CPT | Performed by: ORTHOPAEDIC SURGERY

## 2025-06-09 PROCEDURE — 3048F LDL-C <100 MG/DL: CPT | Performed by: ORTHOPAEDIC SURGERY

## 2025-06-09 PROCEDURE — 3062F POS MACROALBUMINURIA REV: CPT | Performed by: ORTHOPAEDIC SURGERY

## 2025-06-09 PROCEDURE — 73030 X-RAY EXAM OF SHOULDER: CPT | Mod: RIGHT SIDE | Performed by: ORTHOPAEDIC SURGERY

## 2025-06-09 RX ORDER — BETAMETHASONE DIPROPIONATE 0.5 MG/G
CREAM TOPICAL 2 TIMES DAILY
Qty: 45 G | Refills: 0 | Status: SHIPPED | OUTPATIENT
Start: 2025-06-09

## 2025-06-09 NOTE — PROGRESS NOTES
History of present illness: 7 weeks out reverse total shoulder replacement    Patient had a pretty good rash that dissipated with oral prednisone but she still has some itchy scaly skin areas around the incision but the incisions approximated and healing well    Physical exam:    General: No acute distress or breathing difficulty or discomfort, pleasant and cooperative with the examination.    Extremities: Dressing was removed if in place .  The surgical incision was clean and dry without drainage or infection.  There was area of skin irritation and scaly skin rash around the incision site but the skin edges remain approximated nicely    The soft tissues were otherwise intact.  There was no abnormal limb swelling or evidence to indicate blood clots or deep vein thrombosis.    There is no gross evidence of redness or cellulitis.    Motion was within normal limits for postop visit.  The patient could move the extremity on the operative limb in a normal fashion without significant change.  Neurovascular status was unchanged.    Diagnostic studies: X-rays show well-positioned reverse total shoulder    Impression: Right reverse total shoulder now with skin irritation    Plan: No real sign of infection we will put her on a betamethasone dipropionate cream to help with her dermatologic condition    I will see her back 2 weeks no x-rays wound check    Her range of motion is excellent she can begin 1 to 2 pound resistance exercises no x-rays are needed for probably 2-month

## 2025-06-10 NOTE — PROGRESS NOTES
Clinical Pharmacy Appointment    Patient ID: Nan Crane is a 75 y.o. female who presents for Type 2 Diabetes Mellitus.    Pt is here for Follow Up appointment.   Referring Provider: Irvin Muñoz MD  PCP: Irvin Muñoz MD, last visit: 5/22/25, next visit: 9/24/25     Subjective   HPI  PMH significant for T2DM, CKD, GERD, HTN, HLD, TIA, gastroparesis.  Special needs/barriers to therapy: None identified    Medication System Management  Patients preferred pharmacy: [facility]   Adherence/Organization: No current concerns  Affordability/Accessibility: high cost of Trulicity and Jardiance     Drug Interactions  No relevant drug interactions were noted.    DIABETES MELLITUS Type 2:    Known diabetic complications: chronic kidney disease.  Hx or FH Hx of MTC/MEN2?: No   Pancreatitis?: No   Gastroparesis?: yes  UTI/Yeast Infections?: Yes, reported yeast infections once a year    Follows with Endocrinology?: Yes, Dr. [facility], next appt 8/5/25   Diabetic Eye Exam: Up to date, completed 10/2024  Monofilament Foot Exam: Needs completed, last unknown     Current diabetic medications include:  Jardiance 25mg daily  Metformin 500mg BID  Trulicity 0.75mg once weekly (Wednesdays)  Previous medications: Glipizide (hypoglycemia)     Clarifications to above regimen: none  Adverse Effects: none    Glucose Readings:  Glucometer/CGM Type: FreeStyle Jessica 2     Previous home BG readings: (5/28/25)  Average Glucose    7-day 14-day 30-day 90-day   Average 287 239 214 194      Time in Target    7-day 14-day 30-day 90-day   Above (>180) 94% 73% 64% 53%   In Target () 6% 27% 36% 47%   Below (<70) 0% 0% 0% 0%     Current home BG readings:   Review History --> Average Glucose (last option) --> Use arrows on bottom to change # of days  Review History --> Scroll to next page --> Time in Target (2nd option) Use arrows on bottom to change # of days  Average Glucose   7-day 14-day 30-day   Average 226  225 230     Time in Target   7-day 14-day 30-day   Above (>180) 87% 82% 77%   In Target () 13% 18% 23%   Below (<70) 0% 0% 0%     Any episodes of hypoglycemia? No   Did patient treat episode of hypoglycemia appropriately? N/A  Does the patient have a prescription for ready-to-use Glucagon? N/A    Risk Reducing Medications:  Statin? Yes, Rosuvastatin 10mg daily  ACE-I/ARB? Yes, Losartan 50mg daily    Preventative Care  Immunizations Needed: All up-to-date and documented  Tobacco Use: non-smoker    Objective   Allergies[1]  Social History     Social History Narrative    Not on file      Medication Review  Current Outpatient Medications   Medication Instructions    acetaminophen (TYLENOL 8 HOUR) 650 mg, Nightly PRN    acetaminophen (TYLENOL EXTRA STRENGTH) 1,000 mg, Every 8 hours PRN    aspirin 81 mg, oral, Nightly    betamethasone dipropionate 0.05 % cream Topical, 2 times daily    blood sugar diagnostic (Accu-Chek Guide test strips) strip 1 strip, Daily    blood-glucose meter misc Check your blood sugar 3 to 5 times daily.    chlorthalidone (HYGROTON) 25 mg, oral, Daily    cholecalciferol (Vitamin D-3) 50 mcg (2,000 unit) capsule 1 capsule, Daily    coenzyme Q-10 100 mg, Daily    empagliflozin (JARDIANCE) 25 mg, oral, Daily    fluticasone (Flonase) 50 mcg/actuation nasal spray 1 spray, Each Nostril, As needed    FreeStyle Jessica 3 [place] misc     hydrOXYzine HCL (ATARAX) 10 mg, oral, 3 times daily    lancets misc Accu-Chek FastClix Lancets; Use one daily    loratadine (CLARITIN ORAL) 1 tablet, Daily PRN    losartan (COZAAR) 50 mg, oral, Daily    metFORMIN (Glucophage) 1,000 mg tablet     methylPREDNISolone (Medrol Dospak) 4 mg tablets Use as directed by package instructions    naloxone (Narcan) 4 mg/0.1 mL nasal spray Instill 1 spray intranasally for opioid overdose; repeat in 5 minutes if no response.    omeprazole (PRILOSEC) 20 mg, oral, Daily    polyethylene glycol (GLYCOLAX, MIRALAX) 17 g, Daily PRN     rosuvastatin (CRESTOR) 10 mg, oral, Nightly    spironolactone (ALDACTONE) 25 mg, oral, Daily    Trulicity 0.75 mg/0.5 mL pen injector       Vitals  BP Readings from Last 2 Encounters:   05/22/25 120/60   05/02/25 121/72     Labs  A1C  Lab Results   Component Value Date    HGBA1C 7.7 (H) 04/03/2025    HGBA1C 7.4 (A) 02/20/2025    HGBA1C 6.3 10/16/2024     BMP  Lab Results   Component Value Date    CALCIUM 9.4 04/18/2025     04/18/2025    K 3.9 04/18/2025    CO2 20 (L) 04/18/2025     (H) 04/18/2025    BUN 48 (H) 04/18/2025    CREATININE 1.45 (H) 04/18/2025    EGFR 38 (L) 04/18/2025     LFTs  Lab Results   Component Value Date    ALT 12 04/03/2025    AST 20 04/03/2025    ALKPHOS 73 04/03/2025    BILITOT 0.6 04/03/2025     FLP  Lab Results   Component Value Date    TRIG 110 04/03/2025    CHOL 169 04/03/2025    LDLF 65 11/10/2022    LDLCALC 83 04/03/2025    HDL 64.1 04/03/2025     Urine Microalbumin  Lab Results   Component Value Date    MICROALBCREA  04/03/2025      Comment:      One or more analytes used in this calculation is outside of the analytical measurement range. Calculation cannot be performed.     Weight Management  Wt Readings from Last 3 Encounters:   05/22/25 72.4 kg (159 lb 9.6 oz)   04/17/25 69 kg (152 lb 1.9 oz)   04/04/25 70.7 kg (155 lb 12.8 oz)      There is no height or weight on file to calculate BMI.     Assessment/Plan     DIABETES MELLITUS:   Patient's A1c is 7.7% on 4/3/25. Goal A1c <7.5% reasonable due to age and comorbidities.  Patient's SMBGs are elevated, 30 day avg , Time in Range: 77% High, 23% In Target    Rationale for plan: Patient's BG have improved from last appointment, however are still above goal. No adverse effects reported from medications. At this time, plan to increase Trulicity to 1.5mg once weekly. Patient reported high cost of Trulicity (~$200/ month) and Jardiance (~$300/ 3 months). Discussed [facility] PAP, which patient should qualify for based on reported  income. Plan to submit application for  PAP for Trulicity and Jardiance. Follow up in 3 weeks.     Medication Changes:  Increase: Trulicity from 0.75mg once weekly to 1.5mg once weekly      Patient Assistance Screening (VAF)  Patient verbally reports monthly or yearly income which is less than 400% federal poverty level  Application for program has been submitted for the following medications:   Jardiance, Trulicity   Patient aware this process may take up to 2 weeks once income documents have been sent to the team.  If approved, medication must be filled through [facility] pharmacy and may be picked up or mailed to patient.   If approved, medication will be billed through insurance, and patient assistance team will pay the copay. This will result in a $0 copay for the patient.  Counseled patient on mechanism of action, side effects, contraindications, and what to do if the patient misses a dose. All patients questions were answered.     Patient will tax documents to pharmacist, then application will be submitted.     Patient Education:  Counseled patient on relevant medication mechanisms of action, expectations, side effects, duration of therapy, contraindications, administration, and monitoring parameters.  All questions and concerns addressed. Contact pharmacist with any further questions or concerns prior to next appointment.  Reviewed CGM BG goals: Target range , Time in Range Goal >70%, GMI goal <7.5% reasonable due to age and comorbidities%    Clinical Pharmacist follow-up: 7/2/25 12:00PM, Telehealth visit    Thank You,  Dunia Aguilar, PharmD  PGY-1 Pharmacy Resident    Continue all meds under the continuation of care with the referring provider and clinical pharmacy team.  Verbal consent to manage patient's drug therapy was obtained from the patient. They were informed they may decline to participate or withdraw from participation in pharmacy services at any time.         [1]    Allergies  Allergen Reactions    Albuterol Other     Sleeplessness    Azatadine Other    Buspirone Hives    Codeine Other     Vomiting     Other Other and Rash    Quinapril Cough    Pollen Extracts Itching     Runny nose, cough    Trinalin Unknown    Amoxicillin Rash    Azatadine-Pseudoephedrine Rash    Doxycycline Rash    Doxycycline Calcium Rash    Erythromycin Rash    Sulfa (Sulfonamide Antibiotics) Rash    Sulfamethoxazole-Trimethoprim Rash

## 2025-06-11 ENCOUNTER — APPOINTMENT (OUTPATIENT)
Dept: PHARMACY | Facility: HOSPITAL | Age: 75
End: 2025-06-11
Payer: COMMERCIAL

## 2025-06-11 DIAGNOSIS — E11.9 CONTROLLED TYPE 2 DIABETES MELLITUS WITHOUT COMPLICATION, WITHOUT LONG-TERM CURRENT USE OF INSULIN: ICD-10-CM

## 2025-06-11 RX ORDER — DULAGLUTIDE 1.5 MG/.5ML
1.5 INJECTION, SOLUTION SUBCUTANEOUS WEEKLY
Qty: 2 ML | Refills: 0 | Status: SHIPPED | OUTPATIENT
Start: 2025-06-11

## 2025-06-12 DIAGNOSIS — E11.9 CONTROLLED TYPE 2 DIABETES MELLITUS WITHOUT COMPLICATION, WITHOUT LONG-TERM CURRENT USE OF INSULIN: ICD-10-CM

## 2025-06-17 PROCEDURE — RXMED WILLOW AMBULATORY MEDICATION CHARGE

## 2025-06-18 ENCOUNTER — OFFICE VISIT (OUTPATIENT)
Dept: ORTHOPEDIC SURGERY | Facility: CLINIC | Age: 75
End: 2025-06-18
Payer: COMMERCIAL

## 2025-06-18 DIAGNOSIS — Z96.611 STATUS POST TOTAL SHOULDER REPLACEMENT, RIGHT: Primary | ICD-10-CM

## 2025-06-18 PROCEDURE — 3062F POS MACROALBUMINURIA REV: CPT | Performed by: PHYSICIAN ASSISTANT

## 2025-06-18 PROCEDURE — 99024 POSTOP FOLLOW-UP VISIT: CPT | Performed by: PHYSICIAN ASSISTANT

## 2025-06-18 PROCEDURE — 99212 OFFICE O/P EST SF 10 MIN: CPT | Performed by: PHYSICIAN ASSISTANT

## 2025-06-18 PROCEDURE — 3048F LDL-C <100 MG/DL: CPT | Performed by: PHYSICIAN ASSISTANT

## 2025-06-18 PROCEDURE — 3051F HG A1C>EQUAL 7.0%<8.0%: CPT | Performed by: PHYSICIAN ASSISTANT

## 2025-06-18 PROCEDURE — 4010F ACE/ARB THERAPY RXD/TAKEN: CPT | Performed by: PHYSICIAN ASSISTANT

## 2025-06-18 NOTE — PROGRESS NOTES
History of present illness patient is 2-month status post right reverse total shoulder arthroplasty.  Patient had a rash at last visit.  Today was a wound check and it is completely resolved she no longer has any issues with it.      Physical exam:      General: No acute distress or breathing difficulty or discomfort, pleasant and cooperative with the examination.    Extremities: Right shoulder incision is now well-healed intact with no evidence of any rash or redness      Impression: Status post right reverse total shoulder arthroplasty    Plan: Patient will continue work on gentle range of motion on her own.  She will follow-up in 1 month with x-rays 2 views of the right shoulder range of motion check she will no longer have restrictions after that visit.

## 2025-06-20 ENCOUNTER — PHARMACY VISIT (OUTPATIENT)
Dept: PHARMACY | Facility: CLINIC | Age: 75
End: 2025-06-20
Payer: MEDICARE

## 2025-06-26 NOTE — PROGRESS NOTES
Clinical Pharmacy Appointment    Patient ID: Nan Crane is a 75 y.o. female who presents for Type 2 Diabetes Mellitus.    Pt is here for Follow Up appointment.   Appointment completed by: Nan  Referring Provider: Irvin Muñoz MD  PCP: Irvin Muñoz MD, last visit: 5/22/25, next visit: 9/24/25    Subjective   HPI  PMH significant for T2DM, CKD, GERD, HTN, HLD, TIA, gastroparesis.  Special needs/barriers to therapy: None identified    Medication System Management  Patients preferred pharmacy: [facility],  [employer] home delivery for Jardiance and Trulicity  Adherence/Organization: No current concerns  Affordability/Accessibility: No current concerns, [facility]    [facility] Patient Assistance for Trulicity approved through 6/17/26. Will have to be renewed prior to that date to prevent lapse in coverage. Medication(s) will be received at no cost to patient from [facility].     Drug Interactions  No relevant drug interactions were noted.    DIABETES MELLITUS Type 2:    Known diabetic complications: chronic kidney disease.  Hx or FH Hx of MTC/MEN2?: No   Pancreatitis?: No   Gastroparesis?: yes  UTI/Yeast Infections?: Yes, reported yeast infections once a year    Follows with Endocrinology?: Yes, Dr. Starr [facility], next appt 8/5/25   Diabetic Eye Exam: Up to date, completed 10/2024  Monofilament Foot Exam: Needs completed, last unknown     Current diabetic medications include:  Jardiance 25mg daily  Metformin 500mg BID  Trulicity 1.5mg once weekly (Wednesdays)  Previous medications: Glipizide (hypoglycemia)     Clarifications to above regimen: none  Adverse Effects: none    Glucose Readings:  Glucometer/CGM Type: FreeStyle Jessica 3    Previous home BG readings: (6/11/25)  Average Glucose    7-day 14-day 30-day   Average 226 225 230      Time in Target    7-day 14-day 30-day   Above (>180) 87% 82% 77%   In Target () 13% 18% 23%   Below (<70) 0% 0% 0%     Current home BG  [place] 45 Transitions Initial Follow Up Call    Call within 2 business days of discharge: Yes    Patient: Killian Dong Patient : 1938   MRN: [id number]  Reason for Admission: Severe sepsis due to UTI; Afib with RVR  Discharge Date: 22 RARS: Readmission Risk Score: 12.1 ( )    First attempt at 24 hour discharge call, no answer, CTN left VM with contact information and request for return call. CTN spoke with \"Sarah\" at [facility] and confirmed that orders have been received and patient is scheduled for start of care today. CTN will continue with outreach call attempts.         Follow Up  Future Appointments   Date Time Provider Felipe Mayer   2022  4:00 PM BRIGHT Dubois CNP - [facility]   2022  3:30 PM Janessa Lemus MD [facility]   2022 10:30 AM BRIGHT Dubois CNP [address]       Bk Georges RN readings:   Review History --> Average Glucose (last option) --> Use arrows on bottom to change # of days  Review History --> Scroll to next page --> Time in Target (2nd option) Use arrows on bottom to change # of days  Average Glucose   7-day   Average 177     Time in Target   7-day   Above (>180) 44%   In Target () 56%   Below (<70) 0%     Any episodes of hypoglycemia? No  Did patient treat episode of hypoglycemia appropriately? N/A  Does the patient have a prescription for ready-to-use Glucagon? N/A    Risk Reducing Medications:  Statin? Yes, Rosuvastatin 10mg daily  ACE-I/ARB? Yes, Losartan 50mg daily    Preventative Care  Immunizations Needed: All up-to-date and documented  Tobacco Use: non-smoker    Objective   Allergies[1]  Social History     Social History Narrative    Not on file      Medication Review  Current Outpatient Medications   Medication Instructions    acetaminophen (TYLENOL 8 HOUR) 650 mg, Nightly PRN    acetaminophen (TYLENOL EXTRA STRENGTH) 1,000 mg, Every 8 hours PRN    aspirin 81 mg, oral, Nightly    betamethasone dipropionate 0.05 % cream Topical, 2 times daily    blood sugar diagnostic (Accu-Chek Guide test strips) strip 1 strip, Daily    blood-glucose meter misc Check your blood sugar 3 to 5 times daily.    chlorthalidone (HYGROTON) 25 mg, oral, Daily    cholecalciferol (Vitamin D-3) 50 mcg (2,000 unit) capsule 1 capsule, Daily    coenzyme Q-10 100 mg, Daily    empagliflozin (JARDIANCE) 25 mg, oral, Daily    fluticasone (Flonase) 50 mcg/actuation nasal spray 1 spray, Each Nostril, As needed    FreeStyle Jessica 3 [place] misc     hydrOXYzine HCL (ATARAX) 10 mg, oral, 3 times daily    lancets misc Accu-Chek FastClix Lancets; Use one daily    loratadine (CLARITIN ORAL) 1 tablet, Daily PRN    losartan (COZAAR) 50 mg, oral, Daily    metFORMIN (Glucophage) 1,000 mg tablet     methylPREDNISolone (Medrol Dospak) 4 mg tablets Use as directed by package instructions    naloxone (Narcan) 4 mg/0.1  mL nasal spray Instill 1 spray intranasally for opioid overdose; repeat in 5 minutes if no response.    omeprazole (PRILOSEC) 20 mg, oral, Daily    polyethylene glycol (GLYCOLAX, MIRALAX) 17 g, Daily PRN    rosuvastatin (CRESTOR) 10 mg, oral, Nightly    spironolactone (ALDACTONE) 25 mg, oral, Daily    Trulicity 1.5 mg, subcutaneous, Weekly      Vitals  BP Readings from Last 2 Encounters:   05/22/25 120/60   05/02/25 121/72     Labs  A1C  Lab Results   Component Value Date    HGBA1C 7.7 (H) 04/03/2025    HGBA1C 7.4 (A) 02/20/2025    HGBA1C 6.3 10/16/2024     BMP  Lab Results   Component Value Date    CALCIUM 9.4 04/18/2025     04/18/2025    K 3.9 04/18/2025    CO2 20 (L) 04/18/2025     (H) 04/18/2025    BUN 48 (H) 04/18/2025    CREATININE 1.45 (H) 04/18/2025    EGFR 38 (L) 04/18/2025     LFTs  Lab Results   Component Value Date    ALT 12 04/03/2025    AST 20 04/03/2025    ALKPHOS 73 04/03/2025    BILITOT 0.6 04/03/2025     FLP  Lab Results   Component Value Date    TRIG 110 04/03/2025    CHOL 169 04/03/2025    LDLF 65 11/10/2022    LDLCALC 83 04/03/2025    HDL 64.1 04/03/2025     Urine Microalbumin  Lab Results   Component Value Date    MICROALBCREA  04/03/2025      Comment:      One or more analytes used in this calculation is outside of the analytical measurement range. Calculation cannot be performed.     Weight Management  Wt Readings from Last 3 Encounters:   05/22/25 72.4 kg (159 lb 9.6 oz)   04/17/25 69 kg (152 lb 1.9 oz)   04/04/25 70.7 kg (155 lb 12.8 oz)      There is no height or weight on file to calculate BMI.     Assessment/Plan       DIABETES MELLITUS:   Patient's A1c is 7.7% on 4/3/25. Goal A1c <7.5% reasonable due to age and comorbidities.  Patient's SMBGs are 7 day avg 177.     Rationale for plan: Patient's BG have improved from last appointment after dose increase of Trulicity, from 7 day avg  (6/11/25) to 177 (7/2/25). No adverse effects reported from medications. Due to  improvement and patient preference, plan to continue current medication regimen at this time. Follow up in 4 weeks.     Medication Changes:  No Medication Changes      Patient Education:  Counseled patient on relevant medication mechanisms of action, expectations, side effects, duration of therapy, contraindications, administration, and monitoring parameters.  All questions and concerns addressed. Contact pharmacist with any further questions or concerns prior to next appointment.  Reviewed CGM BG goals: Target range , Time in Range Goal >70%, GMI goal <7.5% reasonable due to age and comorbidities%    Clinical Pharmacist follow-up: 7/30/25 12:00PM, Telehealth visit    Thank You,  Dunia Aguilar, PharmD  PGY-1 Pharmacy Resident    Continue all meds under the continuation of care with the referring provider and clinical pharmacy team.  Verbal consent to manage patient's drug therapy was obtained from the patient. They were informed they may decline to participate or withdraw from participation in pharmacy services at any time.         [1]   Allergies  Allergen Reactions    Albuterol Other     Sleeplessness    Azatadine Other    Buspirone Hives    Codeine Other     Vomiting     Other Other and Rash    Quinapril Cough    Pollen Extracts Itching     Runny nose, cough    Trinalin Unknown    Amoxicillin Rash    Azatadine-Pseudoephedrine Rash    Doxycycline Rash    Doxycycline Calcium Rash    Erythromycin Rash    Sulfa (Sulfonamide Antibiotics) Rash    Sulfamethoxazole-Trimethoprim Rash

## 2025-07-02 ENCOUNTER — APPOINTMENT (OUTPATIENT)
Dept: PHARMACY | Facility: HOSPITAL | Age: 75
End: 2025-07-02
Payer: COMMERCIAL

## 2025-07-02 DIAGNOSIS — E11.9 CONTROLLED TYPE 2 DIABETES MELLITUS WITHOUT COMPLICATION, WITHOUT LONG-TERM CURRENT USE OF INSULIN: ICD-10-CM

## 2025-07-02 RX ORDER — DULAGLUTIDE 1.5 MG/.5ML
1.5 INJECTION, SOLUTION SUBCUTANEOUS WEEKLY
Qty: 2 ML | Refills: 0 | Status: SHIPPED | OUTPATIENT
Start: 2025-07-02

## 2025-07-10 PROCEDURE — RXMED WILLOW AMBULATORY MEDICATION CHARGE

## 2025-07-11 ENCOUNTER — PHARMACY VISIT (OUTPATIENT)
Dept: PHARMACY | Facility: CLINIC | Age: 75
End: 2025-07-11
Payer: MEDICARE

## 2025-07-23 ENCOUNTER — HOSPITAL ENCOUNTER (OUTPATIENT)
Dept: RADIOLOGY | Facility: CLINIC | Age: 75
Discharge: HOME | End: 2025-07-23
Payer: COMMERCIAL

## 2025-07-23 ENCOUNTER — OFFICE VISIT (OUTPATIENT)
Dept: ORTHOPEDIC SURGERY | Facility: CLINIC | Age: 75
End: 2025-07-23
Payer: COMMERCIAL

## 2025-07-23 DIAGNOSIS — Z96.611 STATUS POST TOTAL SHOULDER REPLACEMENT, RIGHT: ICD-10-CM

## 2025-07-23 DIAGNOSIS — S42.124A NONDISPLACED FRACTURE OF ACROMIAL PROCESS, RIGHT SHOULDER, INITIAL ENCOUNTER FOR CLOSED FRACTURE: ICD-10-CM

## 2025-07-23 PROCEDURE — 4010F ACE/ARB THERAPY RXD/TAKEN: CPT | Performed by: ORTHOPAEDIC SURGERY

## 2025-07-23 PROCEDURE — 3051F HG A1C>EQUAL 7.0%<8.0%: CPT | Performed by: ORTHOPAEDIC SURGERY

## 2025-07-23 PROCEDURE — 73030 X-RAY EXAM OF SHOULDER: CPT | Mod: RT

## 2025-07-23 PROCEDURE — 73030 X-RAY EXAM OF SHOULDER: CPT | Mod: RIGHT SIDE | Performed by: ORTHOPAEDIC SURGERY

## 2025-07-23 PROCEDURE — 99212 OFFICE O/P EST SF 10 MIN: CPT | Performed by: ORTHOPAEDIC SURGERY

## 2025-07-23 PROCEDURE — 99213 OFFICE O/P EST LOW 20 MIN: CPT | Performed by: ORTHOPAEDIC SURGERY

## 2025-07-23 NOTE — PROGRESS NOTES
History of present illness: History of right shoulder replacement over 3 months out over the last couple weeks she had soreness now pain over the lateral acromial edge    Physical exam:    General: No acute distress or breathing difficulty or discomfort, pleasant and cooperative with the examination.    Extremities: Dressing was removed if in place .  The surgical incision was clean and dry without drainage or infection.  The soft tissues were otherwise intact.  There was no abnormal limb swelling or evidence to indicate blood clots or deep vein thrombosis.    There is no gross evidence of redness or cellulitis.    Tenderness lateral acromion developing over the last couple weeks there is no obvious deformity her motion is good is stated at this time tenderness and pain and morning pain and lifting    Motion was within normal limits for postop visit.  The patient could move the extremity on the operative limb in a normal fashion without significant change.  Neurovascular status was unchanged.    Diagnostic studies: Unremarkable 2 views AP axillary view right shoulder    Impression: Possible acromial stress fracture after reverse shoulder replaced    Plan: Sling for comfort when walking around nonweightbearing    She may remove the sling for bath shower and certain ADLs    I will see her back after CT scan right shoulder to evaluate for acromial fracture

## 2025-07-28 NOTE — PROGRESS NOTES
Clinical Pharmacy Appointment    Patient ID: Nan Crane is a 75 y.o. female who presents for Type 2 Diabetes Mellitus.    Pt is here for Follow Up appointment.   Appointment completed by: Nan  Referring Provider: Irvin Muñoz MD  PCP: Irvin Muñoz MD, last visit: 5/22/25, next visit: 9/24/25    Subjective   HPI  PMH significant for T2DM, CKD, GERD, HTN, HLD, TIA, gastroparesis.  Special needs/barriers to therapy: None identified    Medication System Management  Patients preferred pharmacy: [facility],  [employer] home delivery for Jardiance and Trulicity   Adherence/Organization: No current concerns  Affordability/Accessibility: No current concerns, [facility]    [facility] Patient Assistance for Trulicity approved through 6/17/26. Will have to be renewed prior to that date to prevent lapse in coverage. Medication(s) will be received at no cost to patient from [facility].     Drug Interactions  No relevant drug interactions were noted.    DIABETES MELLITUS Type 2:    Known diabetic complications: chronic kidney disease.  Hx or FH Hx of MTC/MEN2?: No   Pancreatitis?: No   Gastroparesis?: Yes  UTI/Yeast Infections?: Yes,  reported yeast infections once a year    Follows with Endocrinology?: Yes, Dr. Starr -  [facility], next appt 8/5/25   Diabetic Eye Exam: Up to date, completed 10/2024  Monofilament Foot Exam: Needs completed, last unknown     Current diabetic medications include:  Jardiance 25mg daily  Metformin 500mg BID  Trulicity 1.5mg once weekly (Wednesdays)  Previous medications: Glipizide (hypoglycemia)     Clarifications to above regimen: none  Adverse Effects: none    Glucose Readings:  Glucometer/CGM Type: FreeStyle Jessica 3     Previous home BG readings: (7/2/25)  Average Glucose    7-day   Average 177      Time in Target    7-day   Above (>180) 44%   In Target () 56%   Below (<70) 0%     Current home BG readings:   Review History --> Average Glucose (last option)  --> Use arrows on bottom to change # of days  Review History --> Scroll to next page --> Time in Target (2nd option) Use arrows on bottom to change # of days  Average Glucose   7-day 14-day 30-day   Average 202 200 196     Time in Target   7-day 14-day 30-day   Above (>180) 61% 65% 62%   In Target () 39% 35% 38%   Below (<70) 0% 0% 0%      Any episodes of hypoglycemia? No  Did patient treat episode of hypoglycemia appropriately? N/A  Does the patient have a prescription for ready-to-use Glucagon? N/A    Risk Reducing Medications:  Statin? Yes, Rosuvastatin 10mg daily  ACE-I/ARB? Yes, Losartan 50mg daily     Preventative Care  Immunizations Needed: All up-to-date and documented  Tobacco Use: non-smoker    Objective   Allergies[1]  Social History     Social History Narrative    Not on file      Medication Review  Current Outpatient Medications   Medication Instructions    acetaminophen (TYLENOL 8 HOUR) 650 mg, Nightly PRN    acetaminophen (TYLENOL EXTRA STRENGTH) 1,000 mg, Every 8 hours PRN    aspirin 81 mg, oral, Nightly    betamethasone dipropionate 0.05 % cream Topical, 2 times daily    blood sugar diagnostic (Accu-Chek Guide test strips) strip 1 strip, Daily    blood-glucose meter misc Check your blood sugar 3 to 5 times daily.    chlorthalidone (HYGROTON) 25 mg, oral, Daily    cholecalciferol (Vitamin D-3) 50 mcg (2,000 unit) capsule 1 capsule, Daily    coenzyme Q-10 100 mg, Daily    empagliflozin (JARDIANCE) 25 mg, oral, Daily    fluticasone (Flonase) 50 mcg/actuation nasal spray 1 spray, Each Nostril, As needed    FreeStyle Jessica 3 [place] misc     hydrOXYzine HCL (ATARAX) 10 mg, oral, 3 times daily    lancets misc Accu-Chek FastClix Lancets; Use one daily    loratadine (CLARITIN ORAL) 1 tablet, Daily PRN    losartan (COZAAR) 50 mg, oral, Daily    metFORMIN (Glucophage) 1,000 mg tablet     methylPREDNISolone (Medrol Dospak) 4 mg tablets Use as directed by package instructions    naloxone (Narcan) 4 mg/0.1  mL nasal spray Instill 1 spray intranasally for opioid overdose; repeat in 5 minutes if no response.    omeprazole (PRILOSEC) 20 mg, oral, Daily    polyethylene glycol (GLYCOLAX, MIRALAX) 17 g, Daily PRN    rosuvastatin (CRESTOR) 10 mg, oral, Nightly    spironolactone (ALDACTONE) 25 mg, oral, Daily    Trulicity 1.5 mg, subcutaneous, Weekly      Vitals  BP Readings from Last 2 Encounters:   05/22/25 120/60   05/02/25 121/72     Labs  A1C  Lab Results   Component Value Date    HGBA1C 7.7 (H) 04/03/2025    HGBA1C 7.4 (A) 02/20/2025    HGBA1C 6.3 10/16/2024     BMP  Lab Results   Component Value Date    CALCIUM 9.4 04/18/2025     04/18/2025    K 3.9 04/18/2025    CO2 20 (L) 04/18/2025     (H) 04/18/2025    BUN 48 (H) 04/18/2025    CREATININE 1.45 (H) 04/18/2025    EGFR 38 (L) 04/18/2025     LFTs  Lab Results   Component Value Date    ALT 12 04/03/2025    AST 20 04/03/2025    ALKPHOS 73 04/03/2025    BILITOT 0.6 04/03/2025     FLP  Lab Results   Component Value Date    TRIG 110 04/03/2025    CHOL 169 04/03/2025    LDLF 65 11/10/2022    LDLCALC 83 04/03/2025    HDL 64.1 04/03/2025     Urine Microalbumin  Lab Results   Component Value Date    MICROALBCREA  04/03/2025      Comment:      One or more analytes used in this calculation is outside of the analytical measurement range. Calculation cannot be performed.     Weight Management  Wt Readings from Last 3 Encounters:   05/22/25 72.4 kg (159 lb 9.6 oz)   04/17/25 69 kg (152 lb 1.9 oz)   04/04/25 70.7 kg (155 lb 12.8 oz)      There is no height or weight on file to calculate BMI.     Assessment/Plan     DIABETES MELLITUS:   Patient's A1c is 7.7% on 4/3/25. Goal A1c <7.5% reasonable due to age and comorbidities.  Patient's SMBGs are elevated, 7 day avg .     Rationale for plan: Patient reported she is currently under a lot of stress and experiencing pain from fractured shoulder, likely contributing to elevated BG readings. She is taking tylenol PRN but  still reports high levels of pain and discomfort. Counseled patient to reach out to PCP if pain persists. Discussed option of increasing dose of Trulicity, patient would prefer to stay on current dose at this time. Plan to continue current medication regimen at this time and work on healing shoulder injury to decrease stress. Patient has appointment with endocrinologist on 8/5/25. Follow up in 4 weeks.     Medication Changes:  No Medication Changes      Patient Education:  Counseled patient on relevant medication mechanisms of action, expectations, side effects, duration of therapy, contraindications, administration, and monitoring parameters.  All questions and concerns addressed. Contact pharmacist with any further questions or concerns prior to next appointment.  Reviewed CGM BG goals: Target range , Time in Range Goal >70%, GMI goal <7.5% reasonable due to age and comorbidities%    Clinical Pharmacist follow-up: 8/27/25 1:00PM, Telehealth visit    Thank You,  Dunia Aguilar, PharmD  Clinical Pharmacist  [phone]    Continue all meds under the continuation of care with the referring provider and clinical pharmacy team.  Verbal consent to manage patient's drug therapy was obtained from the patient. They were informed they may decline to participate or withdraw from participation in pharmacy services at any time.         [1]   Allergies  Allergen Reactions    Albuterol Other     Sleeplessness    Azatadine Other    Buspirone Hives    Codeine Other     Vomiting     Other Other and Rash    Quinapril Cough    Pollen Extracts Itching     Runny nose, cough    Trinalin Unknown    Amoxicillin Rash    Azatadine-Pseudoephedrine Rash    Doxycycline Rash    Doxycycline Calcium Rash    Erythromycin Rash    Sulfa (Sulfonamide Antibiotics) Rash    Sulfamethoxazole-Trimethoprim Rash

## 2025-07-30 ENCOUNTER — APPOINTMENT (OUTPATIENT)
Dept: PHARMACY | Facility: HOSPITAL | Age: 75
End: 2025-07-30
Payer: COMMERCIAL

## 2025-07-30 DIAGNOSIS — E11.9 CONTROLLED TYPE 2 DIABETES MELLITUS WITHOUT COMPLICATION, WITHOUT LONG-TERM CURRENT USE OF INSULIN: ICD-10-CM

## 2025-07-30 RX ORDER — DULAGLUTIDE 1.5 MG/.5ML
1.5 INJECTION, SOLUTION SUBCUTANEOUS WEEKLY
Qty: 2 ML | Refills: 0 | Status: SHIPPED | OUTPATIENT
Start: 2025-07-30

## 2025-07-31 PROCEDURE — RXMED WILLOW AMBULATORY MEDICATION CHARGE

## 2025-08-01 ENCOUNTER — PHARMACY VISIT (OUTPATIENT)
Dept: PHARMACY | Facility: CLINIC | Age: 75
End: 2025-08-01
Payer: MEDICARE

## 2025-08-05 PROCEDURE — RXMED WILLOW AMBULATORY MEDICATION CHARGE

## 2025-08-06 ENCOUNTER — TELEPHONE (OUTPATIENT)
Dept: PHARMACY | Facility: HOSPITAL | Age: 75
End: 2025-08-06
Payer: COMMERCIAL

## 2025-08-06 DIAGNOSIS — E11.9 CONTROLLED TYPE 2 DIABETES MELLITUS WITHOUT COMPLICATION, WITHOUT LONG-TERM CURRENT USE OF INSULIN: Primary | ICD-10-CM

## 2025-08-06 PROCEDURE — RXMED WILLOW AMBULATORY MEDICATION CHARGE

## 2025-08-06 RX ORDER — DULAGLUTIDE 3 MG/.5ML
3 INJECTION, SOLUTION SUBCUTANEOUS
Qty: 2 ML | Refills: 1 | Status: SHIPPED | OUTPATIENT
Start: 2025-08-06

## 2025-08-06 NOTE — TELEPHONE ENCOUNTER
Clinical Pharmacist Consult  Nan Crane is a 75 y.o. female referred to pharmacy for management of Type 2 Diabetes Mellitus.  Referring Provider: Irvin Muñoz MD    Patient contacted pharmacist to provide update after endocrinology appointment yesterday. Patient reported A1c was resulted at 8.5% and endocrinologist recommended increasing dose of Trulicity to 3mg once weekly. No adverse effects reported from Trulicity. As discussed at last appointment, increasing Trulicity 3mg dose is appropriate. Plan to increase Trulicity to 3mg once weekly. Follow up as scheduled.     Medication Changes:  Increase: Trulicity from 1.5mg once weekly to 3mg once weekly     Patient Education:  All questions and concerns addressed. Contact pharmacist with any further questions or concerns prior to next appointment.    Dunia Aguilar, PharmD  Clinical Pharmacist  [phone]    Continue all meds under the continuation of care with the referring provider and clinical pharmacy team.  Verbal consent to manage patient's drug therapy was obtained from the patient. They were informed they may decline to participate or withdraw from participation in pharmacy services at any time.

## 2025-08-07 ENCOUNTER — PHARMACY VISIT (OUTPATIENT)
Dept: PHARMACY | Facility: CLINIC | Age: 75
End: 2025-08-07
Payer: MEDICARE

## 2025-08-10 ENCOUNTER — HOSPITAL ENCOUNTER (EMERGENCY)
Facility: HOSPITAL | Age: 75
Discharge: HOME | End: 2025-08-10
Payer: COMMERCIAL

## 2025-08-10 ENCOUNTER — APPOINTMENT (OUTPATIENT)
Dept: RADIOLOGY | Facility: HOSPITAL | Age: 75
End: 2025-08-10
Payer: COMMERCIAL

## 2025-08-10 VITALS
HEIGHT: 62 IN | WEIGHT: 152 LBS | DIASTOLIC BLOOD PRESSURE: 77 MMHG | HEART RATE: 99 BPM | SYSTOLIC BLOOD PRESSURE: 168 MMHG | TEMPERATURE: 98.1 F | BODY MASS INDEX: 27.97 KG/M2 | RESPIRATION RATE: 17 BRPM | OXYGEN SATURATION: 97 %

## 2025-08-10 DIAGNOSIS — M25.511 ACUTE PAIN OF RIGHT SHOULDER: Primary | ICD-10-CM

## 2025-08-10 PROCEDURE — 99284 EMERGENCY DEPT VISIT MOD MDM: CPT | Mod: 25

## 2025-08-10 PROCEDURE — 73200 CT UPPER EXTREMITY W/O DYE: CPT | Mod: RT

## 2025-08-10 PROCEDURE — 2500000001 HC RX 250 WO HCPCS SELF ADMINISTERED DRUGS (ALT 637 FOR MEDICARE OP)

## 2025-08-10 PROCEDURE — 73200 CT UPPER EXTREMITY W/O DYE: CPT | Mod: RIGHT SIDE | Performed by: RADIOLOGY

## 2025-08-10 RX ORDER — OXYCODONE HYDROCHLORIDE 5 MG/1
5 TABLET ORAL EVERY 6 HOURS PRN
Qty: 12 TABLET | Refills: 0 | Status: SHIPPED | OUTPATIENT
Start: 2025-08-10 | End: 2025-08-13

## 2025-08-10 RX ORDER — OXYCODONE HYDROCHLORIDE 5 MG/1
5 TABLET ORAL ONCE
Refills: 0 | Status: COMPLETED | OUTPATIENT
Start: 2025-08-10 | End: 2025-08-10

## 2025-08-10 RX ORDER — OXYCODONE HYDROCHLORIDE 5 MG/1
5 TABLET ORAL EVERY 6 HOURS PRN
Qty: 12 TABLET | Refills: 0 | Status: SHIPPED | OUTPATIENT
Start: 2025-08-10 | End: 2025-08-10

## 2025-08-10 RX ADMIN — OXYCODONE HYDROCHLORIDE 5 MG: 5 TABLET ORAL at 15:07

## 2025-08-10 ASSESSMENT — PAIN DESCRIPTION - PAIN TYPE: TYPE: ACUTE PAIN

## 2025-08-10 ASSESSMENT — PAIN - FUNCTIONAL ASSESSMENT: PAIN_FUNCTIONAL_ASSESSMENT: 0-10

## 2025-08-10 ASSESSMENT — PAIN DESCRIPTION - LOCATION: LOCATION: SHOULDER

## 2025-08-10 ASSESSMENT — PAIN SCALES - GENERAL: PAINLEVEL_OUTOF10: 9

## 2025-08-10 ASSESSMENT — PAIN DESCRIPTION - ORIENTATION: ORIENTATION: RIGHT

## 2025-08-14 ENCOUNTER — OFFICE VISIT (OUTPATIENT)
Dept: ORTHOPEDIC SURGERY | Facility: CLINIC | Age: 75
End: 2025-08-14
Payer: COMMERCIAL

## 2025-08-14 ENCOUNTER — APPOINTMENT (OUTPATIENT)
Dept: RADIOLOGY | Facility: CLINIC | Age: 75
End: 2025-08-14
Payer: COMMERCIAL

## 2025-08-14 DIAGNOSIS — Z96.611 STATUS POST TOTAL SHOULDER REPLACEMENT, RIGHT: Primary | ICD-10-CM

## 2025-08-14 DIAGNOSIS — M19.011 PRIMARY OSTEOARTHRITIS OF RIGHT SHOULDER: ICD-10-CM

## 2025-08-14 PROCEDURE — 99212 OFFICE O/P EST SF 10 MIN: CPT | Performed by: ORTHOPAEDIC SURGERY

## 2025-08-15 ENCOUNTER — HOSPITAL ENCOUNTER (OUTPATIENT)
Dept: RADIOLOGY | Facility: CLINIC | Age: 75
Discharge: HOME | End: 2025-08-15
Payer: COMMERCIAL

## 2025-08-15 DIAGNOSIS — Z96.611 STATUS POST TOTAL SHOULDER REPLACEMENT, RIGHT: ICD-10-CM

## 2025-08-15 DIAGNOSIS — M19.011 PRIMARY OSTEOARTHRITIS OF RIGHT SHOULDER: ICD-10-CM

## 2025-08-15 PROCEDURE — 73221 MRI JOINT UPR EXTREM W/O DYE: CPT | Mod: RT

## 2025-08-16 LAB
BASOPHILS # BLD AUTO: 32 CELLS/UL (ref 0–200)
BASOPHILS NFR BLD AUTO: 0.4 %
CRP SERPL-MCNC: NORMAL MG/L
EOSINOPHIL # BLD AUTO: 592 CELLS/UL (ref 15–500)
EOSINOPHIL NFR BLD AUTO: 7.4 %
ERYTHROCYTE [DISTWIDTH] IN BLOOD BY AUTOMATED COUNT: 14.5 % (ref 11–15)
ERYTHROCYTE [SEDIMENTATION RATE] IN BLOOD BY WESTERGREN METHOD: 14 MM/H
HCT VFR BLD AUTO: 41 % (ref 35–45)
HGB BLD-MCNC: 13.1 G/DL (ref 11.7–15.5)
LYMPHOCYTES # BLD AUTO: 2568 CELLS/UL (ref 850–3900)
LYMPHOCYTES NFR BLD AUTO: 32.1 %
MCH RBC QN AUTO: 30.1 PG (ref 27–33)
MCHC RBC AUTO-ENTMCNC: 32 G/DL (ref 32–36)
MCV RBC AUTO: 94.3 FL (ref 80–100)
MONOCYTES # BLD AUTO: 552 CELLS/UL (ref 200–950)
MONOCYTES NFR BLD AUTO: 6.9 %
NEUTROPHILS # BLD AUTO: 4256 CELLS/UL (ref 1500–7800)
NEUTROPHILS NFR BLD AUTO: 53.2 %
PLATELET # BLD AUTO: 272 THOUSAND/UL (ref 140–400)
PMV BLD REES-ECKER: 10.3 FL (ref 7.5–12.5)
RBC # BLD AUTO: 4.35 MILLION/UL (ref 3.8–5.1)
WBC # BLD AUTO: 8 THOUSAND/UL (ref 3.8–10.8)

## 2025-08-18 ENCOUNTER — APPOINTMENT (OUTPATIENT)
Dept: ORTHOPEDIC SURGERY | Facility: CLINIC | Age: 75
End: 2025-08-18
Payer: COMMERCIAL

## 2025-08-18 DIAGNOSIS — M19.011 PRIMARY OSTEOARTHRITIS OF RIGHT SHOULDER: ICD-10-CM

## 2025-08-18 DIAGNOSIS — Z96.611 STATUS POST TOTAL SHOULDER REPLACEMENT, RIGHT: ICD-10-CM

## 2025-08-18 LAB
BASOPHILS # BLD AUTO: 32 CELLS/UL (ref 0–200)
BASOPHILS NFR BLD AUTO: 0.4 %
CRP SERPL-MCNC: <3 MG/L
EOSINOPHIL # BLD AUTO: 592 CELLS/UL (ref 15–500)
EOSINOPHIL NFR BLD AUTO: 7.4 %
ERYTHROCYTE [DISTWIDTH] IN BLOOD BY AUTOMATED COUNT: 14.5 % (ref 11–15)
ERYTHROCYTE [SEDIMENTATION RATE] IN BLOOD BY WESTERGREN METHOD: 14 MM/H
HCT VFR BLD AUTO: 41 % (ref 35–45)
HGB BLD-MCNC: 13.1 G/DL (ref 11.7–15.5)
LYMPHOCYTES # BLD AUTO: 2568 CELLS/UL (ref 850–3900)
LYMPHOCYTES NFR BLD AUTO: 32.1 %
MCH RBC QN AUTO: 30.1 PG (ref 27–33)
MCHC RBC AUTO-ENTMCNC: 32 G/DL (ref 32–36)
MCV RBC AUTO: 94.3 FL (ref 80–100)
MONOCYTES # BLD AUTO: 552 CELLS/UL (ref 200–950)
MONOCYTES NFR BLD AUTO: 6.9 %
NEUTROPHILS # BLD AUTO: 4256 CELLS/UL (ref 1500–7800)
NEUTROPHILS NFR BLD AUTO: 53.2 %
PLATELET # BLD AUTO: 272 THOUSAND/UL (ref 140–400)
PMV BLD REES-ECKER: 10.3 FL (ref 7.5–12.5)
RBC # BLD AUTO: 4.35 MILLION/UL (ref 3.8–5.1)
WBC # BLD AUTO: 8 THOUSAND/UL (ref 3.8–10.8)

## 2025-08-18 PROCEDURE — 99214 OFFICE O/P EST MOD 30 MIN: CPT | Performed by: ORTHOPAEDIC SURGERY

## 2025-08-18 PROCEDURE — 4010F ACE/ARB THERAPY RXD/TAKEN: CPT | Performed by: ORTHOPAEDIC SURGERY

## 2025-08-18 PROCEDURE — 3051F HG A1C>EQUAL 7.0%<8.0%: CPT | Performed by: ORTHOPAEDIC SURGERY

## 2025-08-19 ENCOUNTER — TELEPHONE (OUTPATIENT)
Dept: ORTHOPEDIC SURGERY | Facility: CLINIC | Age: 75
End: 2025-08-19
Payer: COMMERCIAL

## 2025-08-19 DIAGNOSIS — Z96.611 STATUS POST TOTAL SHOULDER REPLACEMENT, RIGHT: ICD-10-CM

## 2025-08-19 DIAGNOSIS — M19.011 PRIMARY OSTEOARTHRITIS OF RIGHT SHOULDER: ICD-10-CM

## 2025-08-19 RX ORDER — CYCLOBENZAPRINE HCL 10 MG
10 TABLET ORAL 3 TIMES DAILY PRN
Qty: 30 TABLET | Refills: 0 | Status: SHIPPED | OUTPATIENT
Start: 2025-08-19 | End: 2025-08-29

## 2025-08-20 ENCOUNTER — APPOINTMENT (OUTPATIENT)
Dept: PRIMARY CARE | Facility: CLINIC | Age: 75
End: 2025-08-20
Payer: COMMERCIAL

## 2025-08-20 VITALS
DIASTOLIC BLOOD PRESSURE: 60 MMHG | WEIGHT: 156 LBS | TEMPERATURE: 97.5 F | RESPIRATION RATE: 18 BRPM | BODY MASS INDEX: 28.53 KG/M2 | HEART RATE: 98 BPM | SYSTOLIC BLOOD PRESSURE: 118 MMHG | OXYGEN SATURATION: 96 %

## 2025-08-20 DIAGNOSIS — Z98.890 HISTORY OF SHOULDER SURGERY: ICD-10-CM

## 2025-08-20 DIAGNOSIS — N18.30 STAGE 3 CHRONIC KIDNEY DISEASE, UNSPECIFIED WHETHER STAGE 3A OR 3B CKD (MULTI): Primary | ICD-10-CM

## 2025-08-20 DIAGNOSIS — M25.511 ACUTE PAIN OF RIGHT SHOULDER: ICD-10-CM

## 2025-08-20 DIAGNOSIS — E11.22 TYPE 2 DIABETES MELLITUS WITH STAGE 3B CHRONIC KIDNEY DISEASE, WITHOUT LONG-TERM CURRENT USE OF INSULIN (MULTI): ICD-10-CM

## 2025-08-20 DIAGNOSIS — N18.32 TYPE 2 DIABETES MELLITUS WITH STAGE 3B CHRONIC KIDNEY DISEASE, WITHOUT LONG-TERM CURRENT USE OF INSULIN (MULTI): ICD-10-CM

## 2025-08-20 DIAGNOSIS — M19.011 OSTEOARTHRITIS OF RIGHT SHOULDER, UNSPECIFIED OSTEOARTHRITIS TYPE: ICD-10-CM

## 2025-08-20 PROBLEM — Z86.39 HISTORY OF DIABETES MELLITUS: Status: RESOLVED | Noted: 2024-05-02 | Resolved: 2025-08-20

## 2025-08-20 PROCEDURE — 3074F SYST BP LT 130 MM HG: CPT | Performed by: INTERNAL MEDICINE

## 2025-08-20 PROCEDURE — 1159F MED LIST DOCD IN RCRD: CPT | Performed by: INTERNAL MEDICINE

## 2025-08-20 PROCEDURE — 3078F DIAST BP <80 MM HG: CPT | Performed by: INTERNAL MEDICINE

## 2025-08-20 PROCEDURE — 1160F RVW MEDS BY RX/DR IN RCRD: CPT | Performed by: INTERNAL MEDICINE

## 2025-08-20 PROCEDURE — 99495 TRANSJ CARE MGMT MOD F2F 14D: CPT | Performed by: INTERNAL MEDICINE

## 2025-08-20 PROCEDURE — 3051F HG A1C>EQUAL 7.0%<8.0%: CPT | Performed by: INTERNAL MEDICINE

## 2025-08-20 PROCEDURE — 1036F TOBACCO NON-USER: CPT | Performed by: INTERNAL MEDICINE

## 2025-08-20 PROCEDURE — 4010F ACE/ARB THERAPY RXD/TAKEN: CPT | Performed by: INTERNAL MEDICINE

## 2025-08-20 RX ORDER — METFORMIN HYDROCHLORIDE 1000 MG/1
500 TABLET ORAL
Qty: 90 TABLET | Refills: 0 | Status: SHIPPED | OUTPATIENT
Start: 2025-08-20

## 2025-08-20 ASSESSMENT — ENCOUNTER SYMPTOMS
ABDOMINAL PAIN: 0
CONFUSION: 0
ARTHRALGIAS: 0
PALPITATIONS: 0
CONSTIPATION: 0
FATIGUE: 0
UNEXPECTED WEIGHT CHANGE: 0
DIZZINESS: 0
NAUSEA: 0
CHEST TIGHTNESS: 0
WHEEZING: 0
SLEEP DISTURBANCE: 0
WEAKNESS: 0
JOINT SWELLING: 0
SHORTNESS OF BREATH: 0
DIARRHEA: 0
CHILLS: 0
ADENOPATHY: 0
SORE THROAT: 0
DYSURIA: 0
COUGH: 0
VOMITING: 0

## 2025-08-23 DIAGNOSIS — Z00.00 HEALTH CARE MAINTENANCE: ICD-10-CM

## 2025-08-24 RX ORDER — ROSUVASTATIN CALCIUM 10 MG/1
10 TABLET, COATED ORAL NIGHTLY
Qty: 90 TABLET | Refills: 3 | Status: SHIPPED | OUTPATIENT
Start: 2025-08-24

## 2025-08-27 ENCOUNTER — APPOINTMENT (OUTPATIENT)
Dept: PHARMACY | Facility: HOSPITAL | Age: 75
End: 2025-08-27
Payer: COMMERCIAL

## 2025-08-27 DIAGNOSIS — E11.9 CONTROLLED TYPE 2 DIABETES MELLITUS WITHOUT COMPLICATION, WITHOUT LONG-TERM CURRENT USE OF INSULIN: ICD-10-CM

## 2025-09-04 ENCOUNTER — APPOINTMENT (OUTPATIENT)
Dept: ORTHOPEDIC SURGERY | Facility: CLINIC | Age: 75
End: 2025-09-04
Payer: COMMERCIAL

## 2025-09-22 ENCOUNTER — APPOINTMENT (OUTPATIENT)
Dept: PHARMACY | Facility: HOSPITAL | Age: 75
End: 2025-09-22
Payer: COMMERCIAL

## 2025-09-24 ENCOUNTER — APPOINTMENT (OUTPATIENT)
Dept: PRIMARY CARE | Facility: CLINIC | Age: 75
End: 2025-09-24
Payer: COMMERCIAL

## 2025-11-17 ENCOUNTER — APPOINTMENT (OUTPATIENT)
Dept: OPHTHALMOLOGY | Facility: CLINIC | Age: 75
End: 2025-11-17
Payer: COMMERCIAL

## 2025-12-17 ENCOUNTER — APPOINTMENT (OUTPATIENT)
Dept: PRIMARY CARE | Facility: CLINIC | Age: 75
End: 2025-12-17
Payer: COMMERCIAL

## (undated) DEVICE — ECHELON 60MM REINFORCEMENT: Brand: ECHLEON

## (undated) DEVICE — GLOVE, SURGICAL, PROTEXIS PI , 8.0, PF, LF

## (undated) DEVICE — DRAPE,LAP,CHOLE,W/TROUGHS,STERILE: Brand: MEDLINE

## (undated) DEVICE — SURGICEL ENDOSCP APPL

## (undated) DEVICE — SUTURE, VICRYL 2-0, TAPER POINT, CT-1 UNDYED 27 INCH

## (undated) DEVICE — Device

## (undated) DEVICE — APPLICATOR MEDICATED 26 CC SOLUTION HI LT ORNG CHLORAPREP

## (undated) DEVICE — SUTURE, VICRYL 0, TAPER POINT, CT-1 VIOLET 27 INCH

## (undated) DEVICE — BLADE, SAW, SAGITTAL, DUAL CUT, 18 X 90 X 1.27

## (undated) DEVICE — FLEXIBLE ENDOSCOPE: Brand: ASCOPE™ 4 BRONCHO REGULAR 5.0/2.2

## (undated) DEVICE — DRESSING GZ W1XL8IN COT XRFRM N ADH OVERWRAP CURAD

## (undated) DEVICE — CONNECTOR TBNG WHT PLAS SUCT STR 5IN1 LTWT W/ M CONN

## (undated) DEVICE — STOCKINETTE, IMPERVIOUS, LARGE, 9IN X 48IN

## (undated) DEVICE — ADHESIVE, SKIN, DERMABOND ADVANCED, 15CM, PEN-STYLE

## (undated) DEVICE — ELECTRODE PT RET AD L9FT HI MOIST COND ADH HYDRGEL CORDED

## (undated) DEVICE — MAGNETIC INSTR DRAPE 20X16: Brand: MEDLINE INDUSTRIES, INC.

## (undated) DEVICE — TUBING, IRRIGATION, HIGH FLOW, HAND PIECE SET

## (undated) DEVICE — SPONGE,LAP,18"X18",DLX,XR,ST,5/PK,40/PK: Brand: MEDLINE

## (undated) DEVICE — PREP, IODOPHOR, W/ALCOHOL, DURAPREP, W/APPLICATOR, 26 CC

## (undated) DEVICE — BLADE ES ELASTOMERIC COAT INSUL DURABLE BEND UPTO 90DEG

## (undated) DEVICE — BAG RETRIEVAL SPECIMEN SUPERBAG 10 MEDIUM NYLON ITRODUCER

## (undated) DEVICE — GOWN,SIRUS,NONRNF,SETINSLV,2XL,18/CS: Brand: MEDLINE

## (undated) DEVICE — CATHETER THOR 20FR L22IN PVC 4 EYELET STR ATRAUM

## (undated) DEVICE — GLOVE ORANGE PI 8   MSG9080

## (undated) DEVICE — PAD,NON-ADHERENT,3X8,STERILE,LF,1/PK: Brand: MEDLINE

## (undated) DEVICE — CONTAINER,SPECIMEN,OR STERILE,4OZ: Brand: MEDLINE

## (undated) DEVICE — DRAPE, INCISE, ANTIMICROBIAL, IOBAN 2, LARGE, 17 X 23 IN, DISPOSABLE, STERILE

## (undated) DEVICE — TIP, SUCTION, YANKAUER, FLEXIBLE

## (undated) DEVICE — WOUND SYSTEM, DEBRIDEMENT & CLEANING, O.R DUOPAK

## (undated) DEVICE — DRESSING, MEPILEX BORDER, POST-OP AG, 4 X 12 IN

## (undated) DEVICE — WARMER SCP 2 ANTIFOG LAP DISP

## (undated) DEVICE — BANDAGE, COFLEX, 4 X 5 YDS, TAN, STERILE, LF

## (undated) DEVICE — GLOVE, SURGICAL, PROTEXIS PI , 7.5, PF, LF

## (undated) DEVICE — DRAPE, INSTRUMENT, W/POUCH, STERI DRAPE, 7 X 11 IN, DISPOSABLE, STERILE

## (undated) DEVICE — PADDING, CAST, SPECIALIST, 4 IN X 4 YD, STERILE

## (undated) DEVICE — SUTURE, MONOCRYL, 4-0, 27 IN, PS-2, UNDYED

## (undated) DEVICE — BAG RETRIEVAL SPECIMEN SUPERBAG 15 2XL NYLON ITRODUCER

## (undated) DEVICE — SUTURE, FIBERWIRE 2, T-5 TAPER NEEDLE, 38"

## (undated) DEVICE — SYRINGE IRRIG 60ML SFT PLIABLE BLB EZ TO GRP 1 HND USE W/

## (undated) DEVICE — COUNTER NDL 40 COUNT HLD 70 FOAM BLK ADH W/ MAG

## (undated) DEVICE — RELOAD STPL L60MM H1.5-3.6MM REG TISS BLU GRIPPING SURF B

## (undated) DEVICE — UNIT DRNGE SGL COLL W/ INLINE CONN EXPR

## (undated) DEVICE — BANDAGE, ELASTIC, 4 X 10YD, BEIGE, LF

## (undated) DEVICE — TUBING, SUCTION, 1/4" X 10', STRAIGHT: Brand: MEDLINE

## (undated) DEVICE — SUTURE VCRL SZ 3-0 L27IN ABSRB UD L26MM SH 1/2 CIR J416H

## (undated) DEVICE — PACK,BASIC: Brand: MEDLINE

## (undated) DEVICE — INTENDED FOR TISSUE SEPARATION, AND OTHER PROCEDURES THAT REQUIRE A SHARP SURGICAL BLADE TO PUNCTURE OR CUT.: Brand: BARD-PARKER ® CARBON RIB-BACK BLADES

## (undated) DEVICE — TOWEL,OR,DSP,ST,BLUE,STD,4/PK,20PK/CS: Brand: MEDLINE

## (undated) DEVICE — CAUTERY, PENCIL, PUSH BUTTON, SMOKE EVAC, 70MM

## (undated) DEVICE — SOLUTION, IRRIGATION, STERILE WATER, 1000 ML, HANG BOTTLE

## (undated) DEVICE — DRILL, 6MM CANNULATED, 15MM

## (undated) DEVICE — DRESSING COMP W4XL4IN N ADH PD W2.5XL2.5IN GZ BORDERED ADH

## (undated) DEVICE — 4-PORT MANIFOLD: Brand: NEPTUNE 2

## (undated) DEVICE — SOLUTION, IV 0.9% NACL INJECTION USP 1000ML EXCEL PLUS, BAG

## (undated) DEVICE — BATH BLANKET STERILE

## (undated) DEVICE — COVER LT HNDL BLU PLAS

## (undated) DEVICE — KIT,ANTI FOG,W/SPONGE & FLUID,SOFT PACK: Brand: MEDLINE

## (undated) DEVICE — AGENT HEMSTAT 3GM OXIDIZED REGENERATED CELOS ABSRB FOR CONT (ORDER MULTIPLES OF 5EA)

## (undated) DEVICE — SUTURE VCRL SZ 4-0 L18IN ABSRB UD L19MM PS-2 3/8 CIR PRIM J496H

## (undated) DEVICE — STRAP, ARM BOARD, 32 X 1.5

## (undated) DEVICE — GOWN,AURORA,NONREINFORCED,LARGE: Brand: MEDLINE

## (undated) DEVICE — DRILL, REVERSE SHOULDER, 2.5MM, STERILE

## (undated) DEVICE — GLOVE, SURGICAL, PROTEXIS PI BLUE W/NEUTHERA, 8.0, PF, LF

## (undated) DEVICE — LABEL MED MINI W/ MARKER

## (undated) DEVICE — TROCAR ENDOSCP L12MM BLK NONCONDUCTIVE SL SFT REP DISP

## (undated) DEVICE — MANIFOLD, 4 PORT NEPTUNE STANDARD

## (undated) DEVICE — GAUZE,SPONGE,4"X4",16PLY,XRAY,STRL,LF: Brand: MEDLINE

## (undated) DEVICE — TAPE,ELASTIC,FOAM,CURAD,3"X5.5YD,LF: Brand: CURAD

## (undated) DEVICE — BLADE ES L6IN ELASTOMERIC COAT INSUL DURABLE BEND UPTO

## (undated) DEVICE — NEPTUNE E-SEP SMOKE EVACUATION PENCIL, COATED, 70MM BLADE, PUSH BUTTON SWITCH: Brand: NEPTUNE E-SEP

## (undated) DEVICE — KIT, SCHLEIN, BEACH CHAIR, LF

## (undated) DEVICE — STAPLER SKIN L320MM 35MM VASC TISS 12 FIRING B FRM PWR

## (undated) DEVICE — HYPODERMIC SAFETY NEEDLE: Brand: MAGELLAN

## (undated) DEVICE — STRAP, VELCRO, BODY, 4 X 60IN, NS

## (undated) DEVICE — STAPLER INT L34CM 60MM LNG ENDOSCP ARTC PWR + ECHELON FLX

## (undated) DEVICE — MAT, FLOOR, STANDARD FLUID BARRIER, 32X44, GREEN

## (undated) DEVICE — ENDOPATH ECHELON VASCULAR  RELOADS, WHITE, 35MM: Brand: ECHELON ENDOPATH

## (undated) DEVICE — SUTURE PERMAHAND SZ 0 L30IN NONABSORBABLE BLK FSL L30MM 3/8 680H

## (undated) DEVICE — SYRINGE MED 10ML LUERLOCK TIP W/O SFTY DISP